# Patient Record
Sex: MALE | Race: WHITE | Employment: FULL TIME | URBAN - METROPOLITAN AREA
[De-identification: names, ages, dates, MRNs, and addresses within clinical notes are randomized per-mention and may not be internally consistent; named-entity substitution may affect disease eponyms.]

---

## 2017-07-09 ENCOUNTER — APPOINTMENT (OUTPATIENT)
Dept: GENERAL RADIOLOGY | Age: 37
End: 2017-07-09

## 2017-07-09 ENCOUNTER — HOSPITAL ENCOUNTER (EMERGENCY)
Age: 37
Discharge: HOME | End: 2017-07-09
Attending: EMERGENCY MEDICINE

## 2017-07-09 VITALS
SYSTOLIC BLOOD PRESSURE: 123 MMHG | RESPIRATION RATE: 22 BRPM | HEART RATE: 90 BPM | WEIGHT: 160 LBS | OXYGEN SATURATION: 98 % | DIASTOLIC BLOOD PRESSURE: 76 MMHG

## 2017-07-09 DIAGNOSIS — S62.609B FINGER FRACTURE, LEFT, OPEN, INITIAL ENCOUNTER: ICD-10-CM

## 2017-07-09 DIAGNOSIS — S61.412A LACERATION OF HAND, LEFT, INITIAL ENCOUNTER: Primary | ICD-10-CM

## 2017-07-09 DIAGNOSIS — S61.209A FINGER, OPEN WOUNDS WITH TENDON INJURY, INITIAL ENCOUNTER: ICD-10-CM

## 2017-07-09 PROCEDURE — 6360000002 HC RX W HCPCS: Performed by: EMERGENCY MEDICINE

## 2017-07-09 PROCEDURE — 2500000003 HC RX 250 WO HCPCS: Performed by: EMERGENCY MEDICINE

## 2017-07-09 PROCEDURE — 12002 RPR S/N/AX/GEN/TRNK2.6-7.5CM: CPT

## 2017-07-09 PROCEDURE — 73130 X-RAY EXAM OF HAND: CPT

## 2017-07-09 PROCEDURE — 96375 TX/PRO/DX INJ NEW DRUG ADDON: CPT

## 2017-07-09 PROCEDURE — 2580000003 HC RX 258: Performed by: EMERGENCY MEDICINE

## 2017-07-09 PROCEDURE — 6370000000 HC RX 637 (ALT 250 FOR IP): Performed by: EMERGENCY MEDICINE

## 2017-07-09 PROCEDURE — 96365 THER/PROPH/DIAG IV INF INIT: CPT

## 2017-07-09 PROCEDURE — 90715 TDAP VACCINE 7 YRS/> IM: CPT | Performed by: EMERGENCY MEDICINE

## 2017-07-09 PROCEDURE — 90471 IMMUNIZATION ADMIN: CPT | Performed by: EMERGENCY MEDICINE

## 2017-07-09 PROCEDURE — 2580000003 HC RX 258

## 2017-07-09 PROCEDURE — 99283 EMERGENCY DEPT VISIT LOW MDM: CPT

## 2017-07-09 RX ORDER — 0.9 % SODIUM CHLORIDE 0.9 %
500 INTRAVENOUS SOLUTION INTRAVENOUS ONCE
Status: COMPLETED | OUTPATIENT
Start: 2017-07-09 | End: 2017-07-09

## 2017-07-09 RX ORDER — LORAZEPAM 2 MG/ML
1 INJECTION INTRAMUSCULAR ONCE
Status: COMPLETED | OUTPATIENT
Start: 2017-07-09 | End: 2017-07-09

## 2017-07-09 RX ORDER — MAGNESIUM HYDROXIDE 1200 MG/15ML
1000 LIQUID ORAL CONTINUOUS
Status: DISCONTINUED | OUTPATIENT
Start: 2017-07-09 | End: 2017-07-09 | Stop reason: HOSPADM

## 2017-07-09 RX ORDER — IBUPROFEN 600 MG/1
600 TABLET ORAL EVERY 6 HOURS PRN
Qty: 30 TABLET | Refills: 0 | Status: SHIPPED | OUTPATIENT
Start: 2017-07-09 | End: 2018-08-26

## 2017-07-09 RX ORDER — MORPHINE SULFATE 4 MG/ML
4 INJECTION, SOLUTION INTRAMUSCULAR; INTRAVENOUS ONCE
Status: COMPLETED | OUTPATIENT
Start: 2017-07-09 | End: 2017-07-09

## 2017-07-09 RX ORDER — GINSENG 100 MG
CAPSULE ORAL 3 TIMES DAILY
Status: DISCONTINUED | OUTPATIENT
Start: 2017-07-09 | End: 2017-07-09 | Stop reason: HOSPADM

## 2017-07-09 RX ORDER — OXYCODONE HYDROCHLORIDE AND ACETAMINOPHEN 5; 325 MG/1; MG/1
1 TABLET ORAL EVERY 6 HOURS PRN
Qty: 20 TABLET | Refills: 0 | Status: SHIPPED | OUTPATIENT
Start: 2017-07-09 | End: 2018-08-26

## 2017-07-09 RX ORDER — LIDOCAINE HYDROCHLORIDE 10 MG/ML
5 INJECTION, SOLUTION INFILTRATION; PERINEURAL ONCE
Status: COMPLETED | OUTPATIENT
Start: 2017-07-09 | End: 2017-07-09

## 2017-07-09 RX ORDER — CEPHALEXIN 500 MG/1
500 CAPSULE ORAL 4 TIMES DAILY
Qty: 40 CAPSULE | Refills: 0 | Status: SHIPPED | OUTPATIENT
Start: 2017-07-09 | End: 2017-07-19

## 2017-07-09 RX ORDER — MAGNESIUM HYDROXIDE 1200 MG/15ML
LIQUID ORAL
Status: COMPLETED
Start: 2017-07-09 | End: 2017-07-09

## 2017-07-09 RX ORDER — IBUPROFEN 200 MG
200 TABLET ORAL EVERY 6 HOURS PRN
COMMUNITY
End: 2018-08-26

## 2017-07-09 RX ORDER — ONDANSETRON 2 MG/ML
4 INJECTION INTRAMUSCULAR; INTRAVENOUS ONCE
Status: COMPLETED | OUTPATIENT
Start: 2017-07-09 | End: 2017-07-09

## 2017-07-09 RX ADMIN — ONDANSETRON 4 MG: 2 INJECTION INTRAMUSCULAR; INTRAVENOUS at 12:18

## 2017-07-09 RX ADMIN — CEFAZOLIN 1 G: 1 INJECTION, POWDER, FOR SOLUTION INTRAMUSCULAR; INTRAVENOUS at 12:59

## 2017-07-09 RX ADMIN — SODIUM CHLORIDE: 900 IRRIGANT IRRIGATION at 12:35

## 2017-07-09 RX ADMIN — BACITRACIN: 500 OINTMENT TOPICAL at 14:00

## 2017-07-09 RX ADMIN — SODIUM CHLORIDE 1000 ML: 900 IRRIGANT IRRIGATION at 12:33

## 2017-07-09 RX ADMIN — SODIUM CHLORIDE 500 ML: 9 INJECTION, SOLUTION INTRAVENOUS at 12:17

## 2017-07-09 RX ADMIN — MORPHINE SULFATE 4 MG: 4 INJECTION, SOLUTION INTRAMUSCULAR; INTRAVENOUS at 12:18

## 2017-07-09 RX ADMIN — LORAZEPAM 1 MG: 2 INJECTION INTRAMUSCULAR; INTRAVENOUS at 12:30

## 2017-07-09 RX ADMIN — TETANUS TOXOID, REDUCED DIPHTHERIA TOXOID AND ACELLULAR PERTUSSIS VACCINE, ADSORBED 0.5 ML: 5; 2.5; 8; 8; 2.5 SUSPENSION INTRAMUSCULAR at 12:30

## 2017-07-09 RX ADMIN — LIDOCAINE HYDROCHLORIDE 5 ML: 10 INJECTION, SOLUTION INFILTRATION; PERINEURAL at 13:00

## 2017-07-09 ASSESSMENT — ENCOUNTER SYMPTOMS
TROUBLE SWALLOWING: 0
CONSTIPATION: 0
CHEST TIGHTNESS: 0
EYE PAIN: 0
SINUS PRESSURE: 0
EYE DISCHARGE: 0
ABDOMINAL PAIN: 0
SHORTNESS OF BREATH: 0
EYE REDNESS: 0
WHEEZING: 0
VOMITING: 0
DIARRHEA: 0
CHOKING: 0
BACK PAIN: 0
BLOOD IN STOOL: 0
STRIDOR: 0
VOICE CHANGE: 0
FACIAL SWELLING: 0
SORE THROAT: 0
COUGH: 0

## 2017-07-09 ASSESSMENT — PAIN SCALES - GENERAL
PAINLEVEL_OUTOF10: 10
PAINLEVEL_OUTOF10: 7
PAINLEVEL_OUTOF10: 10
PAINLEVEL_OUTOF10: 10

## 2017-07-09 ASSESSMENT — PAIN DESCRIPTION - LOCATION: LOCATION: HAND

## 2017-07-17 ENCOUNTER — ANESTHESIA (OUTPATIENT)
Dept: OPERATING ROOM | Age: 37
End: 2017-07-17

## 2017-07-17 ENCOUNTER — HOSPITAL ENCOUNTER (OUTPATIENT)
Age: 37
Setting detail: OUTPATIENT SURGERY
Discharge: HOME | End: 2017-07-17
Attending: ORTHOPAEDIC SURGERY | Admitting: ORTHOPAEDIC SURGERY

## 2017-07-17 ENCOUNTER — HOSPITAL ENCOUNTER (OUTPATIENT)
Dept: GENERAL RADIOLOGY | Age: 37
Setting detail: OUTPATIENT SURGERY
Discharge: HOME | End: 2017-07-17
Attending: ORTHOPAEDIC SURGERY

## 2017-07-17 ENCOUNTER — ANESTHESIA EVENT (OUTPATIENT)
Dept: OPERATING ROOM | Age: 37
End: 2017-07-17

## 2017-07-17 VITALS
SYSTOLIC BLOOD PRESSURE: 154 MMHG | TEMPERATURE: 96.1 F | RESPIRATION RATE: 16 BRPM | DIASTOLIC BLOOD PRESSURE: 91 MMHG | OXYGEN SATURATION: 100 %

## 2017-07-17 VITALS
RESPIRATION RATE: 16 BRPM | WEIGHT: 165 LBS | HEART RATE: 62 BPM | HEIGHT: 73 IN | SYSTOLIC BLOOD PRESSURE: 126 MMHG | TEMPERATURE: 98.7 F | DIASTOLIC BLOOD PRESSURE: 76 MMHG | BODY MASS INDEX: 21.87 KG/M2 | OXYGEN SATURATION: 96 %

## 2017-07-17 DIAGNOSIS — R52 PAIN: ICD-10-CM

## 2017-07-17 PROCEDURE — 2500000003 HC RX 250 WO HCPCS: Performed by: NURSE ANESTHETIST, CERTIFIED REGISTERED

## 2017-07-17 PROCEDURE — 6360000002 HC RX W HCPCS: Performed by: ORTHOPAEDIC SURGERY

## 2017-07-17 PROCEDURE — 2720000010 HC SURG SUPPLY STERILE: Performed by: ORTHOPAEDIC SURGERY

## 2017-07-17 PROCEDURE — 3700000001 HC ADD 15 MINUTES (ANESTHESIA): Performed by: ORTHOPAEDIC SURGERY

## 2017-07-17 PROCEDURE — 6360000002 HC RX W HCPCS: Performed by: NURSE ANESTHETIST, CERTIFIED REGISTERED

## 2017-07-17 PROCEDURE — 2500000003 HC RX 250 WO HCPCS: Performed by: STUDENT IN AN ORGANIZED HEALTH CARE EDUCATION/TRAINING PROGRAM

## 2017-07-17 PROCEDURE — 2580000003 HC RX 258: Performed by: STUDENT IN AN ORGANIZED HEALTH CARE EDUCATION/TRAINING PROGRAM

## 2017-07-17 PROCEDURE — C1713 ANCHOR/SCREW BN/BN,TIS/BN: HCPCS | Performed by: ORTHOPAEDIC SURGERY

## 2017-07-17 PROCEDURE — 7100000010 HC PHASE II RECOVERY - FIRST 15 MIN: Performed by: ORTHOPAEDIC SURGERY

## 2017-07-17 PROCEDURE — 3209999900 FLUORO FOR SURGICAL PROCEDURES

## 2017-07-17 PROCEDURE — 7100000000 HC PACU RECOVERY - FIRST 15 MIN: Performed by: ORTHOPAEDIC SURGERY

## 2017-07-17 PROCEDURE — 2580000003 HC RX 258: Performed by: ORTHOPAEDIC SURGERY

## 2017-07-17 PROCEDURE — 3600000004 HC SURGERY LEVEL 4 BASE: Performed by: ORTHOPAEDIC SURGERY

## 2017-07-17 PROCEDURE — 6370000000 HC RX 637 (ALT 250 FOR IP): Performed by: ORTHOPAEDIC SURGERY

## 2017-07-17 PROCEDURE — 6370000000 HC RX 637 (ALT 250 FOR IP): Performed by: STUDENT IN AN ORGANIZED HEALTH CARE EDUCATION/TRAINING PROGRAM

## 2017-07-17 PROCEDURE — 7100000001 HC PACU RECOVERY - ADDTL 15 MIN: Performed by: ORTHOPAEDIC SURGERY

## 2017-07-17 PROCEDURE — 3600000014 HC SURGERY LEVEL 4 ADDTL 15MIN: Performed by: ORTHOPAEDIC SURGERY

## 2017-07-17 PROCEDURE — 3700000000 HC ANESTHESIA ATTENDED CARE: Performed by: ORTHOPAEDIC SURGERY

## 2017-07-17 PROCEDURE — 7100000011 HC PHASE II RECOVERY - ADDTL 15 MIN: Performed by: ORTHOPAEDIC SURGERY

## 2017-07-17 PROCEDURE — 6360000002 HC RX W HCPCS: Performed by: STUDENT IN AN ORGANIZED HEALTH CARE EDUCATION/TRAINING PROGRAM

## 2017-07-17 DEVICE — SCREW BNE L11MM DIA1.5MM HND 316L S STL ST VAR ANG LOK T4: Type: IMPLANTABLE DEVICE | Site: HAND | Status: FUNCTIONAL

## 2017-07-17 DEVICE — SCREW BNE L14MM DIA1.5MM HND 316L S STL ST VAR ANG LOK T4: Type: IMPLANTABLE DEVICE | Site: HAND | Status: FUNCTIONAL

## 2017-07-17 DEVICE — SCREW BNE L10MM DIA1.5MM HND 316L S STL ST VAR ANG LOK T4: Type: IMPLANTABLE DEVICE | Site: HAND | Status: FUNCTIONAL

## 2017-07-17 DEVICE — SCREW BNE L13MM DIA1.5MM HND 316L S STL ST VAR ANG LOK T4: Type: IMPLANTABLE DEVICE | Site: HAND | Status: FUNCTIONAL

## 2017-07-17 DEVICE — IMPLANTABLE DEVICE: Type: IMPLANTABLE DEVICE | Site: HAND | Status: FUNCTIONAL

## 2017-07-17 DEVICE — SCREW BNE L12MM DIA1.5MM CORT S STL ST FULL THRD T4 STARDRV: Type: IMPLANTABLE DEVICE | Site: HAND | Status: FUNCTIONAL

## 2017-07-17 RX ORDER — SODIUM CHLORIDE 0.9 % (FLUSH) 0.9 %
10 SYRINGE (ML) INJECTION PRN
Status: DISCONTINUED | OUTPATIENT
Start: 2017-07-17 | End: 2017-07-17 | Stop reason: HOSPADM

## 2017-07-17 RX ORDER — HYDROCODONE BITARTRATE AND ACETAMINOPHEN 5; 325 MG/1; MG/1
2 TABLET ORAL PRN
Status: COMPLETED | OUTPATIENT
Start: 2017-07-17 | End: 2017-07-17

## 2017-07-17 RX ORDER — FENTANYL CITRATE 50 UG/ML
50 INJECTION, SOLUTION INTRAMUSCULAR; INTRAVENOUS EVERY 10 MIN PRN
Status: DISCONTINUED | OUTPATIENT
Start: 2017-07-17 | End: 2017-07-17 | Stop reason: HOSPADM

## 2017-07-17 RX ORDER — MAGNESIUM HYDROXIDE 1200 MG/15ML
LIQUID ORAL CONTINUOUS PRN
Status: DISCONTINUED | OUTPATIENT
Start: 2017-07-17 | End: 2017-07-17 | Stop reason: HOSPADM

## 2017-07-17 RX ORDER — SODIUM CHLORIDE 0.9 % (FLUSH) 0.9 %
10 SYRINGE (ML) INJECTION EVERY 12 HOURS SCHEDULED
Status: DISCONTINUED | OUTPATIENT
Start: 2017-07-17 | End: 2017-07-17 | Stop reason: HOSPADM

## 2017-07-17 RX ORDER — LIDOCAINE HYDROCHLORIDE 10 MG/ML
1 INJECTION, SOLUTION EPIDURAL; INFILTRATION; INTRACAUDAL; PERINEURAL
Status: COMPLETED | OUTPATIENT
Start: 2017-07-17 | End: 2017-07-17

## 2017-07-17 RX ORDER — OXYCODONE HYDROCHLORIDE AND ACETAMINOPHEN 5; 325 MG/1; MG/1
1 TABLET ORAL EVERY 4 HOURS PRN
Status: DISCONTINUED | OUTPATIENT
Start: 2017-07-17 | End: 2017-07-17 | Stop reason: HOSPADM

## 2017-07-17 RX ORDER — MORPHINE SULFATE 4 MG/ML
4 INJECTION, SOLUTION INTRAMUSCULAR; INTRAVENOUS
Status: DISCONTINUED | OUTPATIENT
Start: 2017-07-17 | End: 2017-07-17 | Stop reason: HOSPADM

## 2017-07-17 RX ORDER — SODIUM CHLORIDE 9 MG/ML
50 INJECTION, SOLUTION INTRAVENOUS CONTINUOUS
Status: DISCONTINUED | OUTPATIENT
Start: 2017-07-17 | End: 2017-07-17 | Stop reason: HOSPADM

## 2017-07-17 RX ORDER — OXYCODONE HYDROCHLORIDE AND ACETAMINOPHEN 5; 325 MG/1; MG/1
1 TABLET ORAL EVERY 4 HOURS PRN
Qty: 40 TABLET | Refills: 0 | Status: SHIPPED | OUTPATIENT
Start: 2017-07-17 | End: 2017-07-24

## 2017-07-17 RX ORDER — SULFAMETHOXAZOLE AND TRIMETHOPRIM 800; 160 MG/1; MG/1
1 TABLET ORAL 2 TIMES DAILY
COMMUNITY
End: 2018-08-26

## 2017-07-17 RX ORDER — MEPERIDINE HYDROCHLORIDE 25 MG/ML
12.5 INJECTION INTRAMUSCULAR; INTRAVENOUS; SUBCUTANEOUS EVERY 5 MIN PRN
Status: DISCONTINUED | OUTPATIENT
Start: 2017-07-17 | End: 2017-07-17 | Stop reason: HOSPADM

## 2017-07-17 RX ORDER — ONDANSETRON 2 MG/ML
INJECTION INTRAMUSCULAR; INTRAVENOUS PRN
Status: DISCONTINUED | OUTPATIENT
Start: 2017-07-17 | End: 2017-07-17 | Stop reason: SDUPTHER

## 2017-07-17 RX ORDER — DIPHENHYDRAMINE HYDROCHLORIDE 50 MG/ML
12.5 INJECTION INTRAMUSCULAR; INTRAVENOUS
Status: DISCONTINUED | OUTPATIENT
Start: 2017-07-17 | End: 2017-07-17 | Stop reason: HOSPADM

## 2017-07-17 RX ORDER — MIDAZOLAM HYDROCHLORIDE 1 MG/ML
INJECTION INTRAMUSCULAR; INTRAVENOUS PRN
Status: DISCONTINUED | OUTPATIENT
Start: 2017-07-17 | End: 2017-07-17 | Stop reason: SDUPTHER

## 2017-07-17 RX ORDER — ONDANSETRON 2 MG/ML
4 INJECTION INTRAMUSCULAR; INTRAVENOUS EVERY 6 HOURS PRN
Status: DISCONTINUED | OUTPATIENT
Start: 2017-07-17 | End: 2017-07-17 | Stop reason: HOSPADM

## 2017-07-17 RX ORDER — HYDROCODONE BITARTRATE AND ACETAMINOPHEN 5; 325 MG/1; MG/1
1 TABLET ORAL PRN
Status: COMPLETED | OUTPATIENT
Start: 2017-07-17 | End: 2017-07-17

## 2017-07-17 RX ORDER — SODIUM CHLORIDE, SODIUM LACTATE, POTASSIUM CHLORIDE, CALCIUM CHLORIDE 600; 310; 30; 20 MG/100ML; MG/100ML; MG/100ML; MG/100ML
INJECTION, SOLUTION INTRAVENOUS CONTINUOUS
Status: DISCONTINUED | OUTPATIENT
Start: 2017-07-17 | End: 2017-07-17 | Stop reason: HOSPADM

## 2017-07-17 RX ORDER — OXYCODONE HYDROCHLORIDE AND ACETAMINOPHEN 5; 325 MG/1; MG/1
2 TABLET ORAL EVERY 4 HOURS PRN
Status: DISCONTINUED | OUTPATIENT
Start: 2017-07-17 | End: 2017-07-17 | Stop reason: HOSPADM

## 2017-07-17 RX ORDER — FENTANYL CITRATE 50 UG/ML
INJECTION, SOLUTION INTRAMUSCULAR; INTRAVENOUS PRN
Status: DISCONTINUED | OUTPATIENT
Start: 2017-07-17 | End: 2017-07-17 | Stop reason: SDUPTHER

## 2017-07-17 RX ORDER — METOCLOPRAMIDE HYDROCHLORIDE 5 MG/ML
10 INJECTION INTRAMUSCULAR; INTRAVENOUS
Status: DISCONTINUED | OUTPATIENT
Start: 2017-07-17 | End: 2017-07-17 | Stop reason: HOSPADM

## 2017-07-17 RX ORDER — ONDANSETRON 2 MG/ML
4 INJECTION INTRAMUSCULAR; INTRAVENOUS
Status: DISCONTINUED | OUTPATIENT
Start: 2017-07-17 | End: 2017-07-17 | Stop reason: HOSPADM

## 2017-07-17 RX ORDER — PROPOFOL 10 MG/ML
INJECTION, EMULSION INTRAVENOUS PRN
Status: DISCONTINUED | OUTPATIENT
Start: 2017-07-17 | End: 2017-07-17 | Stop reason: SDUPTHER

## 2017-07-17 RX ORDER — LIDOCAINE HYDROCHLORIDE 20 MG/ML
INJECTION, SOLUTION INFILTRATION; PERINEURAL PRN
Status: DISCONTINUED | OUTPATIENT
Start: 2017-07-17 | End: 2017-07-17 | Stop reason: SDUPTHER

## 2017-07-17 RX ORDER — ACETAMINOPHEN 325 MG/1
650 TABLET ORAL EVERY 4 HOURS PRN
Status: DISCONTINUED | OUTPATIENT
Start: 2017-07-17 | End: 2017-07-17 | Stop reason: HOSPADM

## 2017-07-17 RX ORDER — MORPHINE SULFATE 2 MG/ML
2 INJECTION, SOLUTION INTRAMUSCULAR; INTRAVENOUS
Status: DISCONTINUED | OUTPATIENT
Start: 2017-07-17 | End: 2017-07-17 | Stop reason: HOSPADM

## 2017-07-17 RX ORDER — GINSENG 100 MG
CAPSULE ORAL PRN
Status: DISCONTINUED | OUTPATIENT
Start: 2017-07-17 | End: 2017-07-17 | Stop reason: HOSPADM

## 2017-07-17 RX ORDER — DEXAMETHASONE SODIUM PHOSPHATE 10 MG/ML
INJECTION INTRAMUSCULAR; INTRAVENOUS PRN
Status: DISCONTINUED | OUTPATIENT
Start: 2017-07-17 | End: 2017-07-17 | Stop reason: SDUPTHER

## 2017-07-17 RX ADMIN — LIDOCAINE HYDROCHLORIDE 0.1 ML: 10 INJECTION, SOLUTION EPIDURAL; INFILTRATION; INTRACAUDAL; PERINEURAL at 10:32

## 2017-07-17 RX ADMIN — HYDROMORPHONE HYDROCHLORIDE 0.5 MG: 1 INJECTION, SOLUTION INTRAMUSCULAR; INTRAVENOUS; SUBCUTANEOUS at 15:00

## 2017-07-17 RX ADMIN — PROPOFOL 200 MG: 10 INJECTION, EMULSION INTRAVENOUS at 12:51

## 2017-07-17 RX ADMIN — LIDOCAINE HYDROCHLORIDE 60 MG: 20 INJECTION, SOLUTION INFILTRATION; PERINEURAL at 12:51

## 2017-07-17 RX ADMIN — HYDROCODONE BITARTRATE AND ACETAMINOPHEN 2 TABLET: 5; 325 TABLET ORAL at 14:50

## 2017-07-17 RX ADMIN — SODIUM CHLORIDE, POTASSIUM CHLORIDE, SODIUM LACTATE AND CALCIUM CHLORIDE: 600; 310; 30; 20 INJECTION, SOLUTION INTRAVENOUS at 13:42

## 2017-07-17 RX ADMIN — MIDAZOLAM HYDROCHLORIDE 2 MG: 1 INJECTION, SOLUTION INTRAMUSCULAR; INTRAVENOUS at 12:45

## 2017-07-17 RX ADMIN — DEXAMETHASONE SODIUM PHOSPHATE 5 MG: 10 INJECTION INTRAMUSCULAR; INTRAVENOUS at 12:59

## 2017-07-17 RX ADMIN — ONDANSETRON 4 MG: 2 INJECTION, SOLUTION INTRAMUSCULAR; INTRAVENOUS at 12:59

## 2017-07-17 RX ADMIN — FENTANYL CITRATE 100 MCG: 50 INJECTION, SOLUTION INTRAMUSCULAR; INTRAVENOUS at 13:05

## 2017-07-17 RX ADMIN — VANCOMYCIN HYDROCHLORIDE 1000 MG: 1 INJECTION, POWDER, LYOPHILIZED, FOR SOLUTION INTRAVENOUS at 11:51

## 2017-07-17 RX ADMIN — HYDROMORPHONE HYDROCHLORIDE 0.9 MG: 1 INJECTION, SOLUTION INTRAMUSCULAR; INTRAVENOUS; SUBCUTANEOUS at 14:23

## 2017-07-17 RX ADMIN — HYDROMORPHONE HYDROCHLORIDE 0.5 MG: 1 INJECTION, SOLUTION INTRAMUSCULAR; INTRAVENOUS; SUBCUTANEOUS at 13:59

## 2017-07-17 RX ADMIN — SODIUM CHLORIDE, POTASSIUM CHLORIDE, SODIUM LACTATE AND CALCIUM CHLORIDE: 600; 310; 30; 20 INJECTION, SOLUTION INTRAVENOUS at 10:33

## 2017-07-17 RX ADMIN — HYDROMORPHONE HYDROCHLORIDE 0.5 MG: 1 INJECTION, SOLUTION INTRAMUSCULAR; INTRAVENOUS; SUBCUTANEOUS at 15:15

## 2017-07-17 RX ADMIN — HYDROMORPHONE HYDROCHLORIDE 0.6 MG: 1 INJECTION, SOLUTION INTRAMUSCULAR; INTRAVENOUS; SUBCUTANEOUS at 13:15

## 2017-07-17 ASSESSMENT — ENCOUNTER SYMPTOMS
VOMITING: 0
HEARTBURN: 0
SHORTNESS OF BREATH: 0
COUGH: 0
CONSTIPATION: 0
EYE PAIN: 0
WHEEZING: 0
BLURRED VISION: 0
SORE THROAT: 0
ABDOMINAL PAIN: 0
BACK PAIN: 0
NAUSEA: 0
DOUBLE VISION: 0
PHOTOPHOBIA: 0
DIARRHEA: 0

## 2017-07-17 ASSESSMENT — PAIN SCALES - GENERAL
PAINLEVEL_OUTOF10: 3
PAINLEVEL_OUTOF10: 4
PAINLEVEL_OUTOF10: 7
PAINLEVEL_OUTOF10: 6
PAINLEVEL_OUTOF10: 5
PAINLEVEL_OUTOF10: 4

## 2017-07-17 ASSESSMENT — PAIN DESCRIPTION - DESCRIPTORS: DESCRIPTORS: ACHING

## 2017-07-17 ASSESSMENT — PAIN - FUNCTIONAL ASSESSMENT: PAIN_FUNCTIONAL_ASSESSMENT: 0-10

## 2018-01-10 ENCOUNTER — HOSPITAL ENCOUNTER (EMERGENCY)
Age: 38
Discharge: HOME | End: 2018-01-10
Attending: EMERGENCY MEDICINE

## 2018-01-10 ENCOUNTER — APPOINTMENT (OUTPATIENT)
Dept: GENERAL RADIOLOGY | Age: 38
End: 2018-01-10

## 2018-01-10 VITALS
OXYGEN SATURATION: 98 % | WEIGHT: 170 LBS | RESPIRATION RATE: 16 BRPM | HEART RATE: 66 BPM | BODY MASS INDEX: 23.03 KG/M2 | SYSTOLIC BLOOD PRESSURE: 124 MMHG | TEMPERATURE: 98.7 F | HEIGHT: 72 IN | DIASTOLIC BLOOD PRESSURE: 65 MMHG

## 2018-01-10 DIAGNOSIS — S60.222A CONTUSION OF LEFT HAND, INITIAL ENCOUNTER: Primary | ICD-10-CM

## 2018-01-10 DIAGNOSIS — M79.642 LEFT HAND PAIN: ICD-10-CM

## 2018-01-10 PROCEDURE — 99283 EMERGENCY DEPT VISIT LOW MDM: CPT

## 2018-01-10 PROCEDURE — 73130 X-RAY EXAM OF HAND: CPT

## 2018-01-10 RX ORDER — NAPROXEN 500 MG/1
500 TABLET ORAL 2 TIMES DAILY
Qty: 30 TABLET | Refills: 0 | Status: SHIPPED | OUTPATIENT
Start: 2018-01-10 | End: 2018-08-26

## 2018-01-10 RX ORDER — PREDNISONE 20 MG/1
20 TABLET ORAL DAILY
Qty: 5 TABLET | Refills: 0 | Status: SHIPPED | OUTPATIENT
Start: 2018-01-10 | End: 2018-01-15

## 2018-01-10 ASSESSMENT — ENCOUNTER SYMPTOMS
SINUS PRESSURE: 0
EYE PAIN: 0
CHOKING: 0
DIARRHEA: 0
WHEEZING: 0
VOICE CHANGE: 0
EYE REDNESS: 0
TROUBLE SWALLOWING: 0
COUGH: 0
SHORTNESS OF BREATH: 0
EYE DISCHARGE: 0
SORE THROAT: 0
CONSTIPATION: 0
FACIAL SWELLING: 0
CHEST TIGHTNESS: 0
BLOOD IN STOOL: 0
VOMITING: 0
ABDOMINAL PAIN: 0
BACK PAIN: 0
STRIDOR: 0

## 2018-01-10 ASSESSMENT — PAIN DESCRIPTION - DESCRIPTORS: DESCRIPTORS: SHARP;SHOOTING

## 2018-01-10 ASSESSMENT — PAIN DESCRIPTION - LOCATION
LOCATION: HAND
LOCATION: HAND

## 2018-01-10 ASSESSMENT — PAIN DESCRIPTION - ORIENTATION
ORIENTATION: LEFT
ORIENTATION: LEFT

## 2018-01-10 ASSESSMENT — PAIN DESCRIPTION - ONSET: ONSET: ON-GOING

## 2018-01-10 ASSESSMENT — PAIN DESCRIPTION - PAIN TYPE
TYPE: ACUTE PAIN
TYPE: CHRONIC PAIN

## 2018-01-10 ASSESSMENT — PAIN SCALES - GENERAL
PAINLEVEL_OUTOF10: 2
PAINLEVEL_OUTOF10: 3

## 2018-01-10 ASSESSMENT — PAIN DESCRIPTION - PROGRESSION: CLINICAL_PROGRESSION: GRADUALLY WORSENING

## 2018-01-10 ASSESSMENT — PAIN DESCRIPTION - FREQUENCY: FREQUENCY: INTERMITTENT

## 2018-01-10 NOTE — ED PROVIDER NOTES
[address] [facility] ED  eMERGENCY dEPARTMENT eNCOUnter      Pt Name: María Maier  MRN: [id number]  [place] 1980  Date of evaluation: 1/10/2018  Provider: Eran Rebollar MD    [address] [place] [address]       Chief Complaint   Patient presents with    Hand Pain         HISTORY OF PRESENT ILLNESS   (Location/Symptom, Timing/Onset, Context/Setting, Quality, Duration, Modifying Factors, Severity)  Note limiting factors. María Maier is a 42 y.o. male who presents to the emergency department Patient come to this emergency because of increasing swelling and pain for the last 2 days time to the (as per patient he has a remote crush injury to the left hand in the month of June he requires stitches and he has chronic numbness to the middle finger which is not new as well as mobility to the finger results are diminished, bony injury nor redness or drainage no fever patient is a     HPI    Nursing Notes were reviewed. REVIEW OF SYSTEMS    (2-9 systems for level 4, 10 or more for level 5)     Review of Systems   Constitutional: Negative. Negative for activity change and fever. HENT: Negative for congestion, drooling, facial swelling, mouth sores, nosebleeds, sinus pressure, sore throat, trouble swallowing and voice change. Eyes: Negative for pain, discharge, redness and visual disturbance. Respiratory: Negative for cough, choking, chest tightness, shortness of breath, wheezing and stridor. Cardiovascular: Negative for chest pain, palpitations and leg swelling. Gastrointestinal: Negative for abdominal pain, blood in stool, constipation, diarrhea and vomiting. Endocrine: Negative for cold intolerance, polyphagia and polyuria. Genitourinary: Negative for dysuria, flank pain, frequency, genital sores and urgency. Musculoskeletal: Positive for arthralgias, joint swelling and myalgias. Negative for back pain, neck pain and neck stiffness. Skin: Negative for pallor and rash.

## 2018-08-26 ENCOUNTER — HOSPITAL ENCOUNTER (EMERGENCY)
Age: 38
Discharge: HOME | End: 2018-08-26
Attending: EMERGENCY MEDICINE

## 2018-08-26 VITALS
SYSTOLIC BLOOD PRESSURE: 119 MMHG | DIASTOLIC BLOOD PRESSURE: 74 MMHG | RESPIRATION RATE: 20 BRPM | OXYGEN SATURATION: 95 % | TEMPERATURE: 99 F | HEART RATE: 97 BPM | HEIGHT: 72 IN | BODY MASS INDEX: 21.67 KG/M2 | WEIGHT: 160 LBS

## 2018-08-26 DIAGNOSIS — H65.01 RIGHT ACUTE SEROUS OTITIS MEDIA, RECURRENCE NOT SPECIFIED: ICD-10-CM

## 2018-08-26 DIAGNOSIS — H92.03 OTALGIA OF BOTH EARS: ICD-10-CM

## 2018-08-26 DIAGNOSIS — J01.10 ACUTE FRONTAL SINUSITIS, RECURRENCE NOT SPECIFIED: Primary | ICD-10-CM

## 2018-08-26 PROCEDURE — 99282 EMERGENCY DEPT VISIT SF MDM: CPT

## 2018-08-26 RX ORDER — AZITHROMYCIN 250 MG/1
TABLET, FILM COATED ORAL
Qty: 6 TABLET | Refills: 0 | Status: SHIPPED | OUTPATIENT
Start: 2018-08-26 | End: 2018-09-05

## 2018-08-26 RX ORDER — NEOMYCIN SULFATE, POLYMYXIN B SULFATE AND HYDROCORTISONE 10; 3.5; 1 MG/ML; MG/ML; [USP'U]/ML
3 SUSPENSION/ DROPS AURICULAR (OTIC) 3 TIMES DAILY
Qty: 1 BOTTLE | Refills: 0 | Status: SHIPPED | OUTPATIENT
Start: 2018-08-26 | End: 2018-09-02

## 2018-08-26 ASSESSMENT — ENCOUNTER SYMPTOMS
SINUS PRESSURE: 1
BACK PAIN: 0
FACIAL SWELLING: 0
CHOKING: 0
SHORTNESS OF BREATH: 0
BLOOD IN STOOL: 0
SORE THROAT: 0
DIARRHEA: 0
CHEST TIGHTNESS: 0
CONSTIPATION: 0
EYE REDNESS: 0
VOMITING: 0
STRIDOR: 0
EYE PAIN: 0
EYE DISCHARGE: 0
RHINORRHEA: 1
TROUBLE SWALLOWING: 0
VOICE CHANGE: 0
SINUS PAIN: 1
COUGH: 0
NAUSEA: 1
WHEEZING: 0
ABDOMINAL PAIN: 0

## 2018-08-26 ASSESSMENT — PAIN DESCRIPTION - PROGRESSION: CLINICAL_PROGRESSION: NOT CHANGED

## 2018-08-26 ASSESSMENT — PAIN DESCRIPTION - DESCRIPTORS
DESCRIPTORS: ACHING
DESCRIPTORS: PRESSURE

## 2018-08-26 ASSESSMENT — PAIN DESCRIPTION - FREQUENCY
FREQUENCY: CONTINUOUS
FREQUENCY: CONTINUOUS

## 2018-08-26 ASSESSMENT — PAIN DESCRIPTION - ONSET: ONSET: ON-GOING

## 2018-08-26 ASSESSMENT — PAIN SCALES - GENERAL
PAINLEVEL_OUTOF10: 9
PAINLEVEL_OUTOF10: 4

## 2018-08-26 ASSESSMENT — PAIN - FUNCTIONAL ASSESSMENT: PAIN_FUNCTIONAL_ASSESSMENT: 0-10

## 2018-08-26 ASSESSMENT — PAIN DESCRIPTION - LOCATION
LOCATION: EAR
LOCATION: EAR;HEAD

## 2018-08-26 ASSESSMENT — PAIN DESCRIPTION - PAIN TYPE
TYPE: ACUTE PAIN
TYPE: ACUTE PAIN

## 2018-08-26 ASSESSMENT — PAIN DESCRIPTION - ORIENTATION: ORIENTATION: RIGHT

## 2018-08-26 NOTE — ED NOTES
Patient complains of nasal congestion and right ear ache starting over the past 24 hours. Resp easy and unlabored. No distress noted.       Mariaelena Chiu RN  08/26/18 4036

## 2018-08-26 NOTE — ED PROVIDER NOTES
syncope, weakness, numbness and headaches. Hematological: Negative for adenopathy. Does not bruise/bleed easily. Psychiatric/Behavioral: Negative for agitation, behavioral problems, hallucinations and sleep disturbance. The patient is not hyperactive. All other systems reviewed and are negative. Except as noted above the remainder of the review of systems was reviewed and negative. PAST MEDICAL HISTORY   History reviewed. No pertinent past medical history. SURGICAL HISTORY       Past Surgical History:   Procedure Laterality Date    FRACTURE SURGERY Left 7/17/2017    OPEN REDUCTION  INTERNAL FIXATION PROXIMAL PHALANX LEFT LONG FINGER  (PAT ON ADMIT)  performed by Chetan Barron MD at [address] [place] [address] HAND SURGERY Left 7/17/2017    LEFT IRRIGATION AND DEBRIDEMENT LEFT HAND  MINI C-ARM SYNTHES MODULAR TRAY performed by Chetan Barron MD at [address]       Previous Medications    PSEUDOEPH-DOXYLAMINE-DM-APAP (NYQUIL PO)    Take by mouth       ALLERGIES     Keflex [cephalexin]    FAMILY HISTORY     History reviewed. No pertinent family history. SOCIAL HISTORY       Social History     Social History    Marital status:      Spouse name: N/A    Number of children: N/A    Years of education: N/A     Social History Main Topics    Smoking status: Current Every Day Smoker     Packs/day: 0.50    Smokeless tobacco: Former User     Types: Chew    Alcohol use Yes      Comment: [id number]    Drug use: Yes     Types: Marijuana      Comment: OCC    Sexual activity: Not Asked     Other Topics Concern    None     Social History Narrative    None       SCREENINGS             PHYSICAL EXAM    (up to 7 for level 4, 8 or more for level 5)     ED Triage Vitals [08/26/18 1425]   BP Temp Temp Source Pulse Resp SpO2 Height Weight   119/74 99 °F (37.2 °C) Oral 97 20 95 % 6' (1.829 m) 160 lb (72.6 kg)       Physical Exam   Constitutional: He is oriented to person, place, and time.  He appears well-developed. HENT:   Head: Normocephalic and atraumatic. Mouth/Throat: No oropharyngeal exudate. Patient looks and sounds very congested nasally and attention given to the oropharynx mild erythema of the oropharynx but no blisters patient has a swelling to the nasal turbinates positive sinus tenderness right tympanic membranes erythematous and bulging consistent with acute otitis media   Eyes: Right eye exhibits no discharge. Left eye exhibits no discharge. No scleral icterus. Neck: Neck supple. No JVD present. No tracheal deviation present. No thyromegaly present. Cardiovascular: Normal rate, regular rhythm and normal heart sounds. Exam reveals no gallop and no friction rub. No murmur heard. Pulmonary/Chest: Breath sounds normal. No respiratory distress. He has no wheezes. Abdominal: Soft. Bowel sounds are normal. He exhibits no mass. There is no rebound. Musculoskeletal: Normal range of motion. He exhibits no edema or tenderness. Lymphadenopathy:     He has no cervical adenopathy. Neurological: He is alert and oriented to person, place, and time. No cranial nerve deficit. He exhibits normal muscle tone. Skin: Skin is warm. No rash noted. No erythema.    Psychiatric: His behavior is normal. Thought content normal.       DIAGNOSTIC RESULTS     EKG: All EKG's are interpreted by the Emergency Department Physician who either signs or Co-signs this chart in the absence of a cardiologist.        RADIOLOGY:   Non-plain film images such as CT, Ultrasound and MRI are read by the radiologist. Plain radiographic images are visualized and preliminarily interpreted by the emergency physician with the below findings:        Interpretation per the Radiologist below, if available at the time of this note:    No orders to display         ED BEDSIDE ULTRASOUND:   Performed by ED Physician - none    LABS:  Labs Reviewed - No data to display    All other labs were within normal range or not returned

## 2023-12-11 ENCOUNTER — HOSPITAL ENCOUNTER (EMERGENCY)
Facility: HOSPITAL | Age: 43
Discharge: HOME | End: 2023-12-11
Attending: STUDENT IN AN ORGANIZED HEALTH CARE EDUCATION/TRAINING PROGRAM
Payer: COMMERCIAL

## 2023-12-11 VITALS
WEIGHT: 182 LBS | DIASTOLIC BLOOD PRESSURE: 87 MMHG | TEMPERATURE: 97.2 F | SYSTOLIC BLOOD PRESSURE: 134 MMHG | BODY MASS INDEX: 24.65 KG/M2 | HEIGHT: 72 IN | HEART RATE: 73 BPM | OXYGEN SATURATION: 97 % | RESPIRATION RATE: 18 BRPM

## 2023-12-11 DIAGNOSIS — K04.7 DENTAL ABSCESS: ICD-10-CM

## 2023-12-11 DIAGNOSIS — K08.89 PAIN, DENTAL: Primary | ICD-10-CM

## 2023-12-11 PROCEDURE — 2500000001 HC RX 250 WO HCPCS SELF ADMINISTERED DRUGS (ALT 637 FOR MEDICARE OP): Performed by: STUDENT IN AN ORGANIZED HEALTH CARE EDUCATION/TRAINING PROGRAM

## 2023-12-11 PROCEDURE — 99283 EMERGENCY DEPT VISIT LOW MDM: CPT | Performed by: STUDENT IN AN ORGANIZED HEALTH CARE EDUCATION/TRAINING PROGRAM

## 2023-12-11 RX ORDER — AMOXICILLIN AND CLAVULANATE POTASSIUM 875; 125 MG/1; MG/1
1 TABLET, FILM COATED ORAL ONCE
Status: COMPLETED | OUTPATIENT
Start: 2023-12-11 | End: 2023-12-11

## 2023-12-11 RX ORDER — OXYCODONE AND ACETAMINOPHEN 5; 325 MG/1; MG/1
1 TABLET ORAL ONCE
Status: COMPLETED | OUTPATIENT
Start: 2023-12-11 | End: 2023-12-11

## 2023-12-11 RX ORDER — AMOXICILLIN AND CLAVULANATE POTASSIUM 875; 125 MG/1; MG/1
1 TABLET, FILM COATED ORAL EVERY 12 HOURS
Qty: 14 TABLET | Refills: 0 | Status: SHIPPED | OUTPATIENT
Start: 2023-12-11 | End: 2023-12-21

## 2023-12-11 RX ORDER — OXYCODONE AND ACETAMINOPHEN 5; 325 MG/1; MG/1
1 TABLET ORAL EVERY 6 HOURS PRN
Qty: 12 TABLET | Refills: 0 | Status: SHIPPED | OUTPATIENT
Start: 2023-12-11 | End: 2023-12-14

## 2023-12-11 RX ADMIN — OXYCODONE HYDROCHLORIDE AND ACETAMINOPHEN 1 TABLET: 5; 325 TABLET ORAL at 13:16

## 2023-12-11 RX ADMIN — AMOXICILLIN AND CLAVULANATE POTASSIUM 875 MG: 875; 125 TABLET, FILM COATED ORAL at 13:16

## 2023-12-11 ASSESSMENT — PAIN SCALES - GENERAL: PAINLEVEL_OUTOF10: 10 - WORST POSSIBLE PAIN

## 2023-12-11 ASSESSMENT — LIFESTYLE VARIABLES
HAVE PEOPLE ANNOYED YOU BY CRITICIZING YOUR DRINKING: NO
HAVE YOU EVER FELT YOU SHOULD CUT DOWN ON YOUR DRINKING: NO
REASON UNABLE TO ASSESS: NO
EVER FELT BAD OR GUILTY ABOUT YOUR DRINKING: NO
EVER HAD A DRINK FIRST THING IN THE MORNING TO STEADY YOUR NERVES TO GET RID OF A HANGOVER: NO

## 2023-12-11 ASSESSMENT — COLUMBIA-SUICIDE SEVERITY RATING SCALE - C-SSRS
6. HAVE YOU EVER DONE ANYTHING, STARTED TO DO ANYTHING, OR PREPARED TO DO ANYTHING TO END YOUR LIFE?: NO
1. IN THE PAST MONTH, HAVE YOU WISHED YOU WERE DEAD OR WISHED YOU COULD GO TO SLEEP AND NOT WAKE UP?: NO
2. HAVE YOU ACTUALLY HAD ANY THOUGHTS OF KILLING YOURSELF?: NO

## 2023-12-11 ASSESSMENT — PAIN DESCRIPTION - PAIN TYPE: TYPE: ACUTE PAIN

## 2023-12-11 ASSESSMENT — PAIN DESCRIPTION - LOCATION: LOCATION: OTHER (COMMENT)

## 2023-12-11 ASSESSMENT — PAIN - FUNCTIONAL ASSESSMENT: PAIN_FUNCTIONAL_ASSESSMENT: 0-10

## 2023-12-11 NOTE — DISCHARGE INSTRUCTIONS
Please take the medications prescribed you as directed.  You have been given referral to multiple dental clinics.  Please call to schedule an appointment.  You may also use the dental clinic that you are currently affiliated with.  Please call your doctor or return to nearest emergency department being experiencing fevers, chills, difficulty swallowing, difficulty speaking, worsening swelling, worsening dental/facial pain, neck pain, chest pain, difficulty breathing, abdominal pain, nausea vomiting, or other signs and symptoms that you find concerning.  These documents have a lot of useful information! Please read them, so you know what to expect, what you can do for yourself at home, and also to know concerning signs warrant a return to the Emergency Department for additional evaluation.  You are welcome back any time. Thank you for entrusting your care to us, I hope we made your visit as pleasant as possible. Wishing you well!    Dr. Floyd

## 2023-12-11 NOTE — ED PROVIDER NOTES
"HPI   Chief Complaint   Patient presents with    Dental Pain     Dental pain, abscess        Patient is a 43-year-old male presenting to the emergency department with complaints of dental pain.  Patient states that he has been having worsening dental pain and swelling over the past few days but states that he is without coming to the emergency department as he \"does not like going to doctors\".  Patient endorses that he does have known poor dentition and does have a dentist that he follows with however he was unable to get an appointment and was advised to come to the emergency department for evaluation.  Patient denies any fevers, chills, chest pain, difficulty breathing, abdominal pain, nausea/vomiting, change in bowel or bladder habits, neck pain, vision changes.  Patient states he had difficulty eating primarily due to the pain but has no difficulty with fluids or swallowing.      History provided by:  Patient   used: No                        [place] Coma Scale Score: 15                  Patient History   No past medical history on file.  No past surgical history on file.  No family history on file.  Social History     Tobacco Use    Smoking status: Not on file    Smokeless tobacco: Not on file   Substance Use Topics    Alcohol use: Not on file    Drug use: Not on file       Physical Exam   ED Triage Vitals [12/11/23 1235]   Temp Heart Rate Resp BP   36.2 °C (97.2 °F) 73 18 134/87      SpO2 Temp Source Heart Rate Source Patient Position   97 % Tympanic Monitor --      BP Location FiO2 (%)     -- --       Physical Exam  Vitals and nursing note reviewed.   Constitutional:       General: He is not in acute distress.     Appearance: Normal appearance. He is not ill-appearing, toxic-appearing or diaphoretic.   HENT:      Head: Normocephalic and atraumatic.      Nose: Nose normal.      Mouth/Throat:      Mouth: Mucous membranes are moist.      Dentition: Abnormal dentition. Dental tenderness, " gingival swelling, dental caries and dental abscesses present.      Tongue: No lesions. Tongue does not deviate from midline.      Palate: No mass and lesions.      Pharynx: No oropharyngeal exudate or posterior oropharyngeal erythema.        Comments: Noted abscess with drainage  Eyes:      General: No scleral icterus.     Extraocular Movements: Extraocular movements intact.      Pupils: Pupils are equal, round, and reactive to light.   Cardiovascular:      Rate and Rhythm: Normal rate and regular rhythm.      Pulses: Normal pulses.      Heart sounds: Normal heart sounds. No murmur heard.     No friction rub. No gallop.   Pulmonary:      Effort: Pulmonary effort is normal. No respiratory distress.      Breath sounds: Normal breath sounds. No stridor. No wheezing, rhonchi or rales.   Chest:      Chest wall: No tenderness.   Abdominal:      General: Abdomen is flat. There is no distension.      Palpations: Abdomen is soft. There is no mass.      Tenderness: There is no abdominal tenderness. There is no guarding.      Hernia: No hernia is present.   Musculoskeletal:         General: No swelling, tenderness, deformity or signs of injury. Normal range of motion.      Cervical back: Normal range of motion and neck supple. No rigidity.   Skin:     General: Skin is warm and dry.      Capillary Refill: Capillary refill takes less than 2 seconds.      Coloration: Skin is not jaundiced or pale.      Findings: No bruising, erythema, lesion or rash.   Neurological:      General: No focal deficit present.      Mental Status: He is alert and oriented to person, place, and time. Mental status is at baseline.   Psychiatric:         Mood and Affect: Mood normal.         Behavior: Behavior normal.         ED Course & MDM   Diagnoses as of 12/11/23 1314   Pain, dental   Dental abscess       Medical Decision Making  Patient is an overall well-appearing 43-year-old male presenting to the emergency department with complaints of dental  tenderness/abscess.  Patient does have an obvious abscess in the right upper lateral maxillary area with some slight maxillary swelling.  Patient's airway is intact.  There is no sublingual tenderness or swelling.  No concerns for Pierre's.  Patient's neck is supple.  Uvula is midline.  No concern for posterior abscess.  Patient does have obvious drainage from an abscess near his teeth and the patient was given a prescription for Augmentin as well as Percocet as well as a dose here in the emergency department.  Patient states that he does have a dentist that he follows with but he was also given referrals to additional dental clinics to facilitate a speedy follow-up.  Patient was given return precautions.  Patient verbalized understanding of all structures given to them.  Patient was appreciative care and agreeable to discharge.        Procedure  Procedures     Harley Floyd DO  12/11/23 1312

## 2024-01-29 ENCOUNTER — APPOINTMENT (OUTPATIENT)
Dept: RADIOLOGY | Facility: HOSPITAL | Age: 44
End: 2024-01-29

## 2024-01-29 ENCOUNTER — HOSPITAL ENCOUNTER (EMERGENCY)
Facility: HOSPITAL | Age: 44
Discharge: HOME | End: 2024-01-29

## 2024-01-29 VITALS
HEART RATE: 93 BPM | OXYGEN SATURATION: 98 % | HEIGHT: 72 IN | TEMPERATURE: 97.2 F | BODY MASS INDEX: 21.67 KG/M2 | WEIGHT: 160 LBS | RESPIRATION RATE: 16 BRPM | SYSTOLIC BLOOD PRESSURE: 138 MMHG | DIASTOLIC BLOOD PRESSURE: 76 MMHG

## 2024-01-29 DIAGNOSIS — J18.9 MULTIFOCAL PNEUMONIA: Primary | ICD-10-CM

## 2024-01-29 LAB
FLUAV RNA RESP QL NAA+PROBE: NOT DETECTED
FLUBV RNA RESP QL NAA+PROBE: NOT DETECTED
RSV RNA RESP QL NAA+PROBE: NOT DETECTED
SARS-COV-2 RNA RESP QL NAA+PROBE: NOT DETECTED

## 2024-01-29 PROCEDURE — 71250 CT THORAX DX C-: CPT

## 2024-01-29 PROCEDURE — 2500000001 HC RX 250 WO HCPCS SELF ADMINISTERED DRUGS (ALT 637 FOR MEDICARE OP): Performed by: PHYSICIAN ASSISTANT

## 2024-01-29 PROCEDURE — 71046 X-RAY EXAM CHEST 2 VIEWS: CPT | Performed by: RADIOLOGY

## 2024-01-29 PROCEDURE — 71250 CT THORAX DX C-: CPT | Performed by: RADIOLOGY

## 2024-01-29 PROCEDURE — 99284 EMERGENCY DEPT VISIT MOD MDM: CPT

## 2024-01-29 PROCEDURE — 87637 SARSCOV2&INF A&B&RSV AMP PRB: CPT | Performed by: PHYSICIAN ASSISTANT

## 2024-01-29 PROCEDURE — 71046 X-RAY EXAM CHEST 2 VIEWS: CPT

## 2024-01-29 RX ORDER — KETOROLAC TROMETHAMINE 10 MG/1
10 TABLET, FILM COATED ORAL EVERY 6 HOURS PRN
Qty: 20 TABLET | Refills: 0 | Status: SHIPPED | OUTPATIENT
Start: 2024-01-29 | End: 2024-02-03

## 2024-01-29 RX ORDER — LEVOFLOXACIN 500 MG/1
750 TABLET, FILM COATED ORAL DAILY
Qty: 8 TABLET | Refills: 0 | Status: SHIPPED | OUTPATIENT
Start: 2024-01-29 | End: 2024-02-03

## 2024-01-29 RX ORDER — LEVOFLOXACIN 250 MG/1
750 TABLET ORAL ONCE
Status: COMPLETED | OUTPATIENT
Start: 2024-01-29 | End: 2024-01-29

## 2024-01-29 RX ADMIN — LEVOFLOXACIN 750 MG: 250 TABLET, FILM COATED ORAL at 18:23

## 2024-01-29 ASSESSMENT — LIFESTYLE VARIABLES
REASON UNABLE TO ASSESS: NO
EVER FELT BAD OR GUILTY ABOUT YOUR DRINKING: NO
HAVE PEOPLE ANNOYED YOU BY CRITICIZING YOUR DRINKING: NO
HAVE YOU EVER FELT YOU SHOULD CUT DOWN ON YOUR DRINKING: NO
EVER HAD A DRINK FIRST THING IN THE MORNING TO STEADY YOUR NERVES TO GET RID OF A HANGOVER: NO

## 2024-01-29 ASSESSMENT — PAIN SCALES - GENERAL: PAINLEVEL_OUTOF10: 4

## 2024-01-29 ASSESSMENT — PAIN - FUNCTIONAL ASSESSMENT: PAIN_FUNCTIONAL_ASSESSMENT: 0-10

## 2024-01-29 ASSESSMENT — PAIN DESCRIPTION - PROGRESSION: CLINICAL_PROGRESSION: NOT CHANGED

## 2024-01-29 NOTE — ED PROVIDER NOTES
HPI   Chief Complaint   Patient presents with    Flu Symptoms     Cough, congestion, chills, fatigue for a few days        A 43-year-old male patient comes in the emergency department today with complaints of not feeling well over the last 2 weeks.  He describes decreased appetite, congestion, cough.  Describes the cough as productive with sputum.  States that sputum has a very foul taste to it.  States he also has some right lung discomfort as well.  Very concerned about pneumonia.  This purpose comes in the emergency department today for further evaluation.  Otherwise no other complaints this present time.                          [place] Coma Scale Score: 15                  Patient History   No past medical history on file.  No past surgical history on file.  No family history on file.  Social History     Tobacco Use    Smoking status: Not on file    Smokeless tobacco: Not on file   Substance Use Topics    Alcohol use: Not on file    Drug use: Not on file       Physical Exam   ED Triage Vitals [01/29/24 1516]   Temperature Heart Rate Respirations BP   36.2 °C (97.2 °F) 93 16 138/76      Pulse Ox Temp Source Heart Rate Source Patient Position   98 % Temporal Monitor Sitting      BP Location FiO2 (%)     Right arm --       Physical Exam  Constitutional:       General: He is in acute distress (Mild).      Appearance: Normal appearance. He is ill-appearing.   HENT:      Head: Normocephalic and atraumatic.      Nose: Nose normal.   Eyes:      Extraocular Movements: Extraocular movements intact.      Conjunctiva/sclera: Conjunctivae normal.   Cardiovascular:      Rate and Rhythm: Normal rate and regular rhythm.   Pulmonary:      Effort: Pulmonary effort is normal. No respiratory distress.      Breath sounds: Normal breath sounds. No stridor. No wheezing.   Musculoskeletal:         General: Swelling (.  There are some mild swelling over the right lateral ribs) present. Normal range of motion.      Cervical back: Normal  range of motion.   Skin:     General: Skin is warm and dry.   Neurological:      General: No focal deficit present.      Mental Status: He is alert and oriented to person, place, and time. Mental status is at baseline.   Psychiatric:         Mood and Affect: Mood normal.         ED Course & MDM   Diagnoses as of 01/29/24 1812   Multifocal pneumonia       Medical Decision Making  A 43-year-old male patient comes in the emergency department today with complaints of not feeling well over the last 2 weeks.  He describes decreased appetite, congestion, cough.  Describes the cough as productive with sputum.  States that sputum has a very foul taste to it.  States he also has some right lung discomfort as well.  Very concerned about pneumonia.  This purpose comes in the emergency department today for further evaluation.  Otherwise no other complaints this present time.    Patient COVID-19, influenza, RSV.  Chest x-ray to rule out pneumonia    Patient's chest x-ray discussed possible right lung mass measuring 60 mm some pleural effusion.  CT study of the chest ordered.  CT study says there is consolidations in the right middle anterior segment of the right upper lobe.  Most likely due to multifocal pneumonia but cannot rule out pulmonary neoplasms.    Will treat patient for multifocal pneumonia.  But discussed with the patient he is very close follow-up.  Patient agrees with this plan expressed full verbal understanding.  All questions answered.    Historians patient    Diagnosis: Multifocal pneumonia          Labs Reviewed   SARS-COV-2 PCR, SYMPTOMATIC - Normal       Result Value    Coronavirus 2019, PCR Not Detected      Narrative:     This assay has received FDA Emergency Use Authorization (EUA) and is only authorized for the duration of time that circumstances exist to justify the authorization of the emergency use of in vitro diagnostic tests for the detection of SARS-CoV-2 virus and/or diagnosis of COVID-19 infection  under section 564(b)(1) of the Act, 21 U.S.C. 360bbb-3(b)(1). This assay is an in vitro diagnostic nucleic acid amplification test for the qualitative detection of SARS-CoV-2 from nasopharyngeal specimens and has been validated for use at [facility]. Negative results do not preclude COVID-19 infections and should not be used as the sole basis for diagnosis, treatment, or other management decisions.     INFLUENZA A AND B PCR - Normal    Flu A Result Not Detected      Flu B Result Not Detected      Narrative:     This assay is an in vitro diagnostic multiplex nucleic acid amplification test for the detection and discrimination of Influenza A & B from nasopharyngeal specimens, and has been validated for use at [facility]. Negative results do not preclude Influenza A/B infections, and should not be used as the sole basis for diagnosis, treatment, or other management decisions. If Influenza A/B and RSV PCR results are negative, testing for Parainfluenza virus, Adenovirus and Metapneumovirus is routinely performed for [facility] [place] and intensive care inpatients, and is available on other patients by placing an add-on request.   RSV PCR - Normal    RSV PCR Not Detected      Narrative:     This assay is an FDA-cleared, in vitro diagnostic nucleic acid amplification test for the detection of RSV from nasopharyngeal specimens, and has been validated for use at [facility]. Negative results do not preclude RSV infections, and should not be used as the sole basis for diagnosis, treatment, or other management decisions. If Influenza A/B and RSV PCR results are negative, testing for Parainfluenza virus, Adenovirus and Metapneumovirus is routinely performed for pediatric oncology and intensive care inpatients at [facility], and is available on other patients by placing an add-on request.            CT chest wo IV contrast   Final Result   1.  Spiculated  nodular densities and subsegmental consolidations in   the right middle lobe and anterior segment of the right upper lobe,   due to multifocal pneumonia or pulmonary neoplasms. Clinical   correlation is recommended. If clinically indicated, further   evaluation by tissue sampling may be obtained.        MACRO:   None        Signed by: Macario Rowell 1/29/2024 5:50 PM   Dictation workstation:   [id number]      XR chest 2 views   Final Result   1.  Possible right lung mass measuring 16 mm pleural effusion right   hilar adenopathy. Recommend further evaluation with chest CT                  MACRO:   None        Signed by: Joseph Schoenberger 1/29/2024 4:23 PM   Dictation workstation:   [id number]            Procedure  Procedures     Deejay Velásquez PA-C  01/29/24 1812

## 2024-02-18 ENCOUNTER — HOSPITAL ENCOUNTER (EMERGENCY)
Facility: HOSPITAL | Age: 44
Discharge: HOME | End: 2024-02-18
Payer: MEDICAID

## 2024-02-18 ENCOUNTER — APPOINTMENT (OUTPATIENT)
Dept: RADIOLOGY | Facility: HOSPITAL | Age: 44
End: 2024-02-18
Payer: MEDICAID

## 2024-02-18 VITALS
WEIGHT: 159.83 LBS | HEART RATE: 86 BPM | HEIGHT: 72 IN | BODY MASS INDEX: 21.65 KG/M2 | OXYGEN SATURATION: 96 % | DIASTOLIC BLOOD PRESSURE: 83 MMHG | TEMPERATURE: 98.1 F | SYSTOLIC BLOOD PRESSURE: 142 MMHG | RESPIRATION RATE: 16 BRPM

## 2024-02-18 DIAGNOSIS — R91.8 MASS OF LUNG: Primary | ICD-10-CM

## 2024-02-18 DIAGNOSIS — R93.5 ABNORMAL CT OF THE ABDOMEN: ICD-10-CM

## 2024-02-18 LAB
ALBUMIN SERPL BCP-MCNC: 3.8 G/DL (ref 3.4–5)
ALP SERPL-CCNC: 107 U/L (ref 33–120)
ALT SERPL W P-5'-P-CCNC: 4 U/L (ref 10–52)
ANION GAP SERPL CALC-SCNC: 15 MMOL/L (ref 10–20)
AST SERPL W P-5'-P-CCNC: 8 U/L (ref 9–39)
BASOPHILS # BLD AUTO: 0.07 X10*3/UL (ref 0–0.1)
BASOPHILS NFR BLD AUTO: 0.5 %
BILIRUB DIRECT SERPL-MCNC: 0 MG/DL (ref 0–0.3)
BILIRUB SERPL-MCNC: 0.4 MG/DL (ref 0–1.2)
BUN SERPL-MCNC: 10 MG/DL (ref 6–23)
CALCIUM SERPL-MCNC: 9.7 MG/DL (ref 8.6–10.3)
CHLORIDE SERPL-SCNC: 103 MMOL/L (ref 98–107)
CO2 SERPL-SCNC: 25 MMOL/L (ref 21–32)
CREAT SERPL-MCNC: 0.73 MG/DL (ref 0.5–1.3)
EGFRCR SERPLBLD CKD-EPI 2021: >90 ML/MIN/1.73M*2
EOSINOPHIL # BLD AUTO: 0.16 X10*3/UL (ref 0–0.7)
EOSINOPHIL NFR BLD AUTO: 1.2 %
ERYTHROCYTE [DISTWIDTH] IN BLOOD BY AUTOMATED COUNT: 16 % (ref 11.5–14.5)
GLUCOSE SERPL-MCNC: 105 MG/DL (ref 74–99)
HCT VFR BLD AUTO: 34.1 % (ref 41–52)
HGB BLD-MCNC: 10.9 G/DL (ref 13.5–17.5)
IMM GRANULOCYTES # BLD AUTO: 0.07 X10*3/UL (ref 0–0.7)
IMM GRANULOCYTES NFR BLD AUTO: 0.5 % (ref 0–0.9)
LACTATE SERPL-SCNC: 0.9 MMOL/L (ref 0.4–2)
LIPASE SERPL-CCNC: 6 U/L (ref 9–82)
LYMPHOCYTES # BLD AUTO: 2.01 X10*3/UL (ref 1.2–4.8)
LYMPHOCYTES NFR BLD AUTO: 14.6 %
MCH RBC QN AUTO: 29.1 PG (ref 26–34)
MCHC RBC AUTO-ENTMCNC: 32 G/DL (ref 32–36)
MCV RBC AUTO: 91 FL (ref 80–100)
MONOCYTES # BLD AUTO: 0.63 X10*3/UL (ref 0.1–1)
MONOCYTES NFR BLD AUTO: 4.6 %
NEUTROPHILS # BLD AUTO: 10.82 X10*3/UL (ref 1.2–7.7)
NEUTROPHILS NFR BLD AUTO: 78.6 %
NRBC BLD-RTO: 0 /100 WBCS (ref 0–0)
PLATELET # BLD AUTO: 349 X10*3/UL (ref 150–450)
POTASSIUM SERPL-SCNC: 4 MMOL/L (ref 3.5–5.3)
PROT SERPL-MCNC: 7.9 G/DL (ref 6.4–8.2)
RBC # BLD AUTO: 3.74 X10*6/UL (ref 4.5–5.9)
SODIUM SERPL-SCNC: 139 MMOL/L (ref 136–145)
WBC # BLD AUTO: 13.8 X10*3/UL (ref 4.4–11.3)

## 2024-02-18 PROCEDURE — 85025 COMPLETE CBC W/AUTO DIFF WBC: CPT | Performed by: NURSE PRACTITIONER

## 2024-02-18 PROCEDURE — 96376 TX/PRO/DX INJ SAME DRUG ADON: CPT

## 2024-02-18 PROCEDURE — 2500000004 HC RX 250 GENERAL PHARMACY W/ HCPCS (ALT 636 FOR OP/ED): Performed by: NURSE PRACTITIONER

## 2024-02-18 PROCEDURE — 99284 EMERGENCY DEPT VISIT MOD MDM: CPT | Mod: 25

## 2024-02-18 PROCEDURE — 80053 COMPREHEN METABOLIC PANEL: CPT | Performed by: NURSE PRACTITIONER

## 2024-02-18 PROCEDURE — 71260 CT THORAX DX C+: CPT

## 2024-02-18 PROCEDURE — 83605 ASSAY OF LACTIC ACID: CPT | Performed by: NURSE PRACTITIONER

## 2024-02-18 PROCEDURE — 83690 ASSAY OF LIPASE: CPT | Performed by: NURSE PRACTITIONER

## 2024-02-18 PROCEDURE — 2550000001 HC RX 255 CONTRASTS: Performed by: NURSE PRACTITIONER

## 2024-02-18 PROCEDURE — 82248 BILIRUBIN DIRECT: CPT | Performed by: NURSE PRACTITIONER

## 2024-02-18 PROCEDURE — 96374 THER/PROPH/DIAG INJ IV PUSH: CPT

## 2024-02-18 PROCEDURE — 2500000005 HC RX 250 GENERAL PHARMACY W/O HCPCS: Performed by: PHYSICIAN ASSISTANT

## 2024-02-18 PROCEDURE — 96375 TX/PRO/DX INJ NEW DRUG ADDON: CPT

## 2024-02-18 PROCEDURE — 2500000004 HC RX 250 GENERAL PHARMACY W/ HCPCS (ALT 636 FOR OP/ED): Performed by: PHYSICIAN ASSISTANT

## 2024-02-18 PROCEDURE — 74177 CT ABD & PELVIS W/CONTRAST: CPT | Mod: FOREIGN READ | Performed by: RADIOLOGY

## 2024-02-18 PROCEDURE — 99285 EMERGENCY DEPT VISIT HI MDM: CPT | Mod: 25

## 2024-02-18 PROCEDURE — 71260 CT THORAX DX C+: CPT | Mod: FOREIGN READ | Performed by: RADIOLOGY

## 2024-02-18 PROCEDURE — 36415 COLL VENOUS BLD VENIPUNCTURE: CPT | Performed by: NURSE PRACTITIONER

## 2024-02-18 RX ORDER — HYDROMORPHONE HYDROCHLORIDE 1 MG/ML
1 INJECTION, SOLUTION INTRAMUSCULAR; INTRAVENOUS; SUBCUTANEOUS ONCE
Status: COMPLETED | OUTPATIENT
Start: 2024-02-18 | End: 2024-02-18

## 2024-02-18 RX ORDER — LIDOCAINE 560 MG/1
1 PATCH PERCUTANEOUS; TOPICAL; TRANSDERMAL ONCE
Status: DISCONTINUED | OUTPATIENT
Start: 2024-02-18 | End: 2024-02-18 | Stop reason: HOSPADM

## 2024-02-18 RX ORDER — ONDANSETRON HYDROCHLORIDE 2 MG/ML
4 INJECTION, SOLUTION INTRAVENOUS ONCE
Status: COMPLETED | OUTPATIENT
Start: 2024-02-18 | End: 2024-02-18

## 2024-02-18 RX ORDER — MORPHINE SULFATE 4 MG/ML
4 INJECTION, SOLUTION INTRAMUSCULAR; INTRAVENOUS ONCE
Status: COMPLETED | OUTPATIENT
Start: 2024-02-18 | End: 2024-02-18

## 2024-02-18 RX ORDER — OXYCODONE AND ACETAMINOPHEN 5; 325 MG/1; MG/1
1 TABLET ORAL EVERY 6 HOURS PRN
Qty: 12 TABLET | Refills: 0 | Status: SHIPPED | OUTPATIENT
Start: 2024-02-18 | End: 2024-02-23

## 2024-02-18 RX ADMIN — IOHEXOL 75 ML: 350 INJECTION, SOLUTION INTRAVENOUS at 14:53

## 2024-02-18 RX ADMIN — LIDOCAINE 1 PATCH: 4 PATCH TOPICAL at 17:42

## 2024-02-18 RX ADMIN — HYDROMORPHONE HYDROCHLORIDE 0.5 MG: 1 INJECTION, SOLUTION INTRAMUSCULAR; INTRAVENOUS; SUBCUTANEOUS at 17:42

## 2024-02-18 RX ADMIN — ONDANSETRON 4 MG: 2 INJECTION INTRAMUSCULAR; INTRAVENOUS at 14:11

## 2024-02-18 RX ADMIN — MORPHINE SULFATE 4 MG: 4 INJECTION, SOLUTION INTRAMUSCULAR; INTRAVENOUS at 14:11

## 2024-02-18 RX ADMIN — HYDROMORPHONE HYDROCHLORIDE 1 MG: 1 INJECTION, SOLUTION INTRAMUSCULAR; INTRAVENOUS; SUBCUTANEOUS at 19:24

## 2024-02-18 ASSESSMENT — PAIN DESCRIPTION - DESCRIPTORS
DESCRIPTORS: ACHING;SHARP
DESCRIPTORS: ACHING;SHARP

## 2024-02-18 ASSESSMENT — PAIN DESCRIPTION - LOCATION
LOCATION: ABDOMEN

## 2024-02-18 ASSESSMENT — COLUMBIA-SUICIDE SEVERITY RATING SCALE - C-SSRS
1. IN THE PAST MONTH, HAVE YOU WISHED YOU WERE DEAD OR WISHED YOU COULD GO TO SLEEP AND NOT WAKE UP?: NO
6. HAVE YOU EVER DONE ANYTHING, STARTED TO DO ANYTHING, OR PREPARED TO DO ANYTHING TO END YOUR LIFE?: NO
2. HAVE YOU ACTUALLY HAD ANY THOUGHTS OF KILLING YOURSELF?: NO

## 2024-02-18 ASSESSMENT — PAIN DESCRIPTION - PAIN TYPE
TYPE: CHRONIC PAIN;ACUTE PAIN
TYPE: CHRONIC PAIN

## 2024-02-18 ASSESSMENT — PAIN - FUNCTIONAL ASSESSMENT
PAIN_FUNCTIONAL_ASSESSMENT: 0-10

## 2024-02-18 ASSESSMENT — PAIN SCALES - GENERAL
PAINLEVEL_OUTOF10: 8
PAINLEVEL_OUTOF10: 3
PAINLEVEL_OUTOF10: 8

## 2024-02-18 ASSESSMENT — PAIN DESCRIPTION - PROGRESSION: CLINICAL_PROGRESSION: NOT CHANGED

## 2024-02-18 ASSESSMENT — LIFESTYLE VARIABLES
HAVE YOU EVER FELT YOU SHOULD CUT DOWN ON YOUR DRINKING: NO
EVER FELT BAD OR GUILTY ABOUT YOUR DRINKING: NO
EVER HAD A DRINK FIRST THING IN THE MORNING TO STEADY YOUR NERVES TO GET RID OF A HANGOVER: NO
HAVE PEOPLE ANNOYED YOU BY CRITICIZING YOUR DRINKING: NO

## 2024-02-18 ASSESSMENT — PAIN DESCRIPTION - FREQUENCY: FREQUENCY: CONSTANT/CONTINUOUS

## 2024-02-18 ASSESSMENT — PAIN DESCRIPTION - ORIENTATION
ORIENTATION: RIGHT

## 2024-02-18 ASSESSMENT — PAIN DESCRIPTION - ONSET: ONSET: GRADUAL

## 2024-02-18 NOTE — ED PROVIDER NOTES
"HPI   Chief Complaint   Patient presents with    OTHER     Patient is having pain on the right side and the lump is hard and it is not getting any better.  Pain is getting worse.\"       43-year-old male presents emergency department, states he has had mass right anterior lateral ribs for some time, but over the last week or so its gotten significantly larger and more painful.  States pain anytime he touches the area, attempts to lay down.  This is taken for skin and states he had CT scan performed last month when he was diagnosed with pneumonia, but no mention was made of this mass.  States the area is grown larger, again more painful since then      History provided by:  Patient   used: No                        [place] Coma Scale Score: 15                     Patient History   Past Medical History:   Diagnosis Date    Liver cancer (CMS/HCC)     Lung cancer (CMS/HCC)      Past Surgical History:   Procedure Laterality Date    HAND SURGERY      left hand     No family history on file.  Social History     Tobacco Use    Smoking status: Every Day     Packs/day: .5     Types: Cigarettes    Smokeless tobacco: Never   Vaping Use    Vaping Use: Never used   Substance Use Topics    Alcohol use: Yes    Drug use: Yes     Types: Marijuana       Physical Exam   ED Triage Vitals [02/18/24 1237]   Temperature Heart Rate Respirations BP   36.7 °C (98.1 °F) 86 18 139/81      Pulse Ox Temp Source Heart Rate Source Patient Position   98 % Temporal Monitor Sitting      BP Location FiO2 (%)     Right arm --       Physical Exam  Physical Exam:  Constitutional: Vitals noted, no distress. Afebrile.   Cardiovascular: Regular, rate, rhythm, no murmur.   Pulmonary: Lungs clear bilaterally with good aeration. No adventitious breath sounds.   Chest wall: Firm, palpable mass that seems to be originating between ribs right anterior lateral, lower rib area.  It is a mildly erythematous, and again firm to " palpation.  Gastrointestinal: Soft, nonsurgical. Nontender. No peritoneal signs. Normoactive bowel sounds.   Musculoskeletal: No peripheral edema. Negative Homans bilaterally, no cords.   Skin: No rash.   Neuro: No focal neurologic deficits, NIH score of 0.    ED Course & MDM   Diagnoses as of 03/02/24 1502   Mass of lung   Abnormal CT of the abdomen     Labs Reviewed   CBC WITH AUTO DIFFERENTIAL - Abnormal       Result Value    WBC 13.8 (*)     nRBC 0.0      RBC 3.74 (*)     Hemoglobin 10.9 (*)     Hematocrit 34.1 (*)     MCV 91      MCH 29.1      MCHC 32.0      RDW 16.0 (*)     Platelets 349      Neutrophils % 78.6      Immature Granulocytes %, Automated 0.5      Lymphocytes % 14.6      Monocytes % 4.6      Eosinophils % 1.2      Basophils % 0.5      Neutrophils Absolute 10.82 (*)     Immature Granulocytes Absolute, Automated 0.07      Lymphocytes Absolute 2.01      Monocytes Absolute 0.63      Eosinophils Absolute 0.16      Basophils Absolute 0.07     BASIC METABOLIC PANEL - Abnormal    Glucose 105 (*)     Sodium 139      Potassium 4.0      Chloride 103      Bicarbonate 25      Anion Gap 15      Urea Nitrogen 10      Creatinine 0.73      eGFR >90      Calcium 9.7     LIPASE - Abnormal    Lipase 6 (*)     Narrative:     Venipuncture immediately after or during the administration of Metamizole may lead to falsely low results. Testing should be performed immediately prior to Metamizole dosing.   HEPATIC FUNCTION PANEL - Abnormal    Albumin 3.8      Bilirubin, Total 0.4      Bilirubin, Direct 0.0      Alkaline Phosphatase 107      ALT 4 (*)     AST 8 (*)     Total Protein 7.9     LACTATE - Normal    Lactate 0.9      Narrative:     Venipuncture immediately after or during the administration of Metamizole may lead to falsely low results. Testing should be performed immediately  prior to Metamizole dosing.        CT chest abdomen pelvis w IV contrast   Final Result   Interval decrease in the size of the pleural-based  masses involving   the right upper and middle lobes of the lung. Stable intrapulmonary   nodules within the right middle lobe. Interval decrease in the right   pleural effusion. Increasing size of the metastatic lesions involving   the right anterior chest wall and right subphrenic space along the   anterior surface of the liver. Adenopathy noted adjacent to the distal   esophagus.   Signed by Demario Saavedra MD           Medical Decision Making  I did review patient's previous CT imaging, I can see evidence of the mass the patient is referring to but there is no specific comment about this.  Does appear solid.    Repeat workup obtained, CBC shows a slightly elevated white count with hemoglobin of 10.9, metabolic panel unremarkable.    Sent for CT imaging of the chest, abdomen and pelvis for reevaluation.    Signed out to SERA Diop pending CT results.    Procedure  Procedures     NINA Denney-SHAZIA  02/18/24 1528       NINA Denney-CNP  03/02/24 1502

## 2024-02-18 NOTE — ED PROVIDER NOTES
"HPI   Chief Complaint   Patient presents with    OTHER     Patient is having pain on the right side and the lump is hard and it is not getting any better.  Pain is getting worse.\"       HPI                    Ashley Coma Scale Score: 15                     Patient History   History reviewed. No pertinent past medical history.  Past Surgical History:   Procedure Laterality Date    HAND SURGERY      left hand     No family history on file.  Social History     Tobacco Use    Smoking status: Every Day     Types: Cigarettes    Smokeless tobacco: Never   Vaping Use    Vaping Use: Never used   Substance Use Topics    Alcohol use: Yes    Drug use: Yes     Types: Marijuana       Physical Exam   ED Triage Vitals [02/18/24 1237]   Temperature Heart Rate Respirations BP   36.7 °C (98.1 °F) 86 18 139/81      Pulse Ox Temp Source Heart Rate Source Patient Position   98 % Temporal Monitor Sitting      BP Location FiO2 (%)     Right arm --       Physical Exam    ED Course & MDM   Diagnoses as of 02/18/24 2039   Mass of lung   Abnormal CT of the abdomen     Labs Reviewed   CBC WITH AUTO DIFFERENTIAL - Abnormal       Result Value    WBC 13.8 (*)     nRBC 0.0      RBC 3.74 (*)     Hemoglobin 10.9 (*)     Hematocrit 34.1 (*)     MCV 91      MCH 29.1      MCHC 32.0      RDW 16.0 (*)     Platelets 349      Neutrophils % 78.6      Immature Granulocytes %, Automated 0.5      Lymphocytes % 14.6      Monocytes % 4.6      Eosinophils % 1.2      Basophils % 0.5      Neutrophils Absolute 10.82 (*)     Immature Granulocytes Absolute, Automated 0.07      Lymphocytes Absolute 2.01      Monocytes Absolute 0.63      Eosinophils Absolute 0.16      Basophils Absolute 0.07     BASIC METABOLIC PANEL - Abnormal    Glucose 105 (*)     Sodium 139      Potassium 4.0      Chloride 103      Bicarbonate 25      Anion Gap 15      Urea Nitrogen 10      Creatinine 0.73      eGFR >90      Calcium 9.7     LIPASE - Abnormal    Lipase 6 (*)     Narrative:     " Venipuncture immediately after or during the administration of Metamizole may lead to falsely low results. Testing should be performed immediately prior to Metamizole dosing.   HEPATIC FUNCTION PANEL - Abnormal    Albumin 3.8      Bilirubin, Total 0.4      Bilirubin, Direct 0.0      Alkaline Phosphatase 107      ALT 4 (*)     AST 8 (*)     Total Protein 7.9     LACTATE - Normal    Lactate 0.9      Narrative:     Venipuncture immediately after or during the administration of Metamizole may lead to falsely low results. Testing should be performed immediately  prior to Metamizole dosing.      CT chest abdomen pelvis w IV contrast   Final Result   Interval decrease in the size of the pleural-based masses involving   the right upper and middle lobes of the lung. Stable intrapulmonary   nodules within the right middle lobe. Interval decrease in the right   pleural effusion. Increasing size of the metastatic lesions involving   the right anterior chest wall and right subphrenic space along the   anterior surface of the liver. Adenopathy noted adjacent to the distal   esophagus.   Signed by Demario Saavedra MD            Medical Decision Making  Patient handed off to me from Kinsey Mejia NP at 1530 as labs have been finalized, but CT results are still pending.  Final read of CT chest abdomen pelvis with contrast shows findings concerning of metastatic cancer.  Patient very upset with wait time as imaging took 2 hours to read and also upset that he was required to pay $200 for the office visit at [facility].  He states that [facility] also told him that referring to them was inappropriate and he is also upset about this.  Patient is still in pain will be given lidocaine patch and Dilaudid.  Case discussed with the hospitalist for admission for pain control with dr. Simms, who recommends discussing with oncology.  Patient states that he does not have insurance at this time and does not want transferred  if this is recommended.  Patient has been waiting here for over 2 hours waiting for oncology consulted and he is very upset and wishes to be discharged home with pain medication and to be called letting him know about his outpatient appointment.  Transfer center stated that someone here canceled the transfer.  Case discussed with Dr. Pal from oncology and she states that they can see the patient as an outpatient, but he will have to pay out-of-pocket.  She states that she would be happy to see the patient and states that she knows [facility] will see patients without insurance.  I discussed this with the patient and discussed that I will consult [facility] about an appointment.  It is now 2130 and  informs me that the transfer center cannot get a hold of anyone and they will not talk to us unless there is a confirmed diagnosis of cancer.  She suggested for us to call the outpatient line.   states that she will call the patient let them know that [facility] can assist in financial assistance as he needs to follow-up with outpatient oncology.    Procedure  Procedures     Chantal Solitario PA-C  02/18/24 2130

## 2024-02-21 ENCOUNTER — HOSPITAL ENCOUNTER (EMERGENCY)
Facility: HOSPITAL | Age: 44
Discharge: HOME | End: 2024-02-21
Payer: MEDICAID

## 2024-02-21 VITALS
OXYGEN SATURATION: 98 % | BODY MASS INDEX: 22.35 KG/M2 | DIASTOLIC BLOOD PRESSURE: 68 MMHG | TEMPERATURE: 97.7 F | HEART RATE: 98 BPM | WEIGHT: 165 LBS | HEIGHT: 72 IN | SYSTOLIC BLOOD PRESSURE: 151 MMHG | RESPIRATION RATE: 20 BRPM

## 2024-02-21 DIAGNOSIS — R91.8 LUNG MASS: Primary | ICD-10-CM

## 2024-02-21 PROCEDURE — 99283 EMERGENCY DEPT VISIT LOW MDM: CPT

## 2024-02-21 PROCEDURE — 2500000001 HC RX 250 WO HCPCS SELF ADMINISTERED DRUGS (ALT 637 FOR MEDICARE OP): Performed by: PHYSICIAN ASSISTANT

## 2024-02-21 RX ORDER — IBUPROFEN 600 MG/1
600 TABLET ORAL ONCE
Status: COMPLETED | OUTPATIENT
Start: 2024-02-21 | End: 2024-02-21

## 2024-02-21 RX ORDER — OXYCODONE AND ACETAMINOPHEN 5; 325 MG/1; MG/1
1 TABLET ORAL ONCE
Status: COMPLETED | OUTPATIENT
Start: 2024-02-21 | End: 2024-02-21

## 2024-02-21 RX ORDER — OXYCODONE AND ACETAMINOPHEN 5; 325 MG/1; MG/1
1 TABLET ORAL EVERY 6 HOURS PRN
Qty: 12 TABLET | Refills: 0 | Status: ON HOLD | OUTPATIENT
Start: 2024-02-22 | End: 2024-03-05

## 2024-02-21 RX ADMIN — OXYCODONE HYDROCHLORIDE AND ACETAMINOPHEN 1 TABLET: 5; 325 TABLET ORAL at 13:27

## 2024-02-21 RX ADMIN — IBUPROFEN 600 MG: 600 TABLET, FILM COATED ORAL at 13:27

## 2024-02-21 ASSESSMENT — COLUMBIA-SUICIDE SEVERITY RATING SCALE - C-SSRS
1. IN THE PAST MONTH, HAVE YOU WISHED YOU WERE DEAD OR WISHED YOU COULD GO TO SLEEP AND NOT WAKE UP?: NO
2. HAVE YOU ACTUALLY HAD ANY THOUGHTS OF KILLING YOURSELF?: NO
6. HAVE YOU EVER DONE ANYTHING, STARTED TO DO ANYTHING, OR PREPARED TO DO ANYTHING TO END YOUR LIFE?: NO

## 2024-02-21 ASSESSMENT — PAIN DESCRIPTION - LOCATION: LOCATION: ABDOMEN

## 2024-02-21 ASSESSMENT — PAIN SCALES - GENERAL: PAINLEVEL_OUTOF10: 7

## 2024-02-21 ASSESSMENT — PAIN DESCRIPTION - FREQUENCY: FREQUENCY: CONSTANT/CONTINUOUS

## 2024-02-21 ASSESSMENT — PAIN DESCRIPTION - ONSET: ONSET: GRADUAL

## 2024-02-21 ASSESSMENT — PAIN DESCRIPTION - ORIENTATION: ORIENTATION: RIGHT

## 2024-02-21 ASSESSMENT — PAIN DESCRIPTION - DESCRIPTORS: DESCRIPTORS: ACHING;SHARP

## 2024-02-21 ASSESSMENT — PAIN DESCRIPTION - PAIN TYPE: TYPE: ACUTE PAIN

## 2024-02-21 ASSESSMENT — PAIN - FUNCTIONAL ASSESSMENT: PAIN_FUNCTIONAL_ASSESSMENT: 0-10

## 2024-02-21 ASSESSMENT — PAIN DESCRIPTION - PROGRESSION: CLINICAL_PROGRESSION: NOT CHANGED

## 2024-02-21 NOTE — ED PROVIDER NOTES
HPI   Chief Complaint   Patient presents with    Med Refill     Pt. States he is seeing the oncologist on 2/26/2024 to get pain medication.       A 43-year-old male patient comes into the emergency department today with complaints of right lateral rib pain secondary to recently diagnosed lung and liver mass.  States he is seeing oncology on Monday for further evaluation.  States he has been getting Percocet but ran out.  States he does not have a primary care doctor to see to have a prescription filled for this until he sees oncology.  Is here for the purpose of pain control.  Rates pain currently 7 out of 10 on the pain scale.  He otherwise has no other complaints this present time for this purpose comes emergency department today for the evaluation.                          No data recorded                   Patient History   Past Medical History:   Diagnosis Date    Liver cancer (CMS/HCC)     Lung cancer (CMS/HCC)      Past Surgical History:   Procedure Laterality Date    HAND SURGERY      left hand     No family history on file.  Social History     Tobacco Use    Smoking status: Every Day     Packs/day: .5     Types: Cigarettes    Smokeless tobacco: Never   Vaping Use    Vaping Use: Never used   Substance Use Topics    Alcohol use: Yes    Drug use: Yes     Types: Marijuana       Physical Exam   ED Triage Vitals [02/21/24 1256]   Temperature Heart Rate Respirations BP   36.5 °C (97.7 °F) 98 20 151/68      Pulse Ox Temp Source Heart Rate Source Patient Position   98 % Temporal Monitor Sitting      BP Location FiO2 (%)     Right arm --       Physical Exam  Constitutional:       General: He is in acute distress (Moderate distress).      Appearance: Normal appearance. He is not ill-appearing or diaphoretic.   HENT:      Head: Normocephalic and atraumatic.      Nose: Nose normal.   Eyes:      Extraocular Movements: Extraocular movements intact.      Conjunctiva/sclera: Conjunctivae normal.      Pupils: Pupils are  equal, round, and reactive to light.   Cardiovascular:      Rate and Rhythm: Normal rate and regular rhythm.   Pulmonary:      Effort: Pulmonary effort is normal. No respiratory distress.      Breath sounds: Normal breath sounds. No stridor. No wheezing.   Musculoskeletal:         General: Normal range of motion.      Cervical back: Normal range of motion.   Skin:     General: Skin is warm and dry.      Comments: Edema over the right lateral distal ribs with tenderness   Neurological:      General: No focal deficit present.      Mental Status: He is alert and oriented to person, place, and time. Mental status is at baseline.   Psychiatric:         Mood and Affect: Mood normal.         ED Course & MDM   Diagnoses as of 02/21/24 1316   Lung mass       Medical Decision Making  A 43-year-old male patient comes into the emergency department today with complaints of right lateral rib pain secondary to recently diagnosed lung and liver mass.  States he is seeing oncology on Monday for further evaluation.  States he has been getting Percocet but ran out.  States he does not have a primary care doctor to see to have a prescription filled for this until he sees oncology.  Is here for the purpose of pain control.  Rates pain currently 7 out of 10 on the pain scale.  He otherwise has no other complaints this present time for this purpose comes emergency department today for the evaluation.    P.o. Percocet p.o. ibuprofen ordered for the patient here in the emergency department.  Will discharge patient home with prescription for Percocet.  Patient has NSAIDs at home encouraged him to take these as well.    Historian is the patient    Diagnosis: Lung mass        Procedure  Procedures     Deejay Velásquez PA-C  02/21/24 1311

## 2024-02-21 NOTE — Clinical Note
Ritesh Luther was seen and treated in our emergency department on 2/21/2024.  He may return to work on 02/25/2024.       If you have any questions or concerns, please don't hesitate to call.      Deejay Velásquez PA-C

## 2024-02-26 ENCOUNTER — HOSPITAL ENCOUNTER (EMERGENCY)
Facility: HOSPITAL | Age: 44
Discharge: HOME | End: 2024-02-26
Attending: STUDENT IN AN ORGANIZED HEALTH CARE EDUCATION/TRAINING PROGRAM
Payer: MEDICAID

## 2024-02-26 ENCOUNTER — SOCIAL WORK (OUTPATIENT)
Dept: CASE MANAGEMENT | Facility: HOSPITAL | Age: 44
End: 2024-02-26
Payer: MEDICAID

## 2024-02-26 VITALS
BODY MASS INDEX: 21.35 KG/M2 | TEMPERATURE: 98.2 F | RESPIRATION RATE: 18 BRPM | DIASTOLIC BLOOD PRESSURE: 79 MMHG | HEIGHT: 72 IN | WEIGHT: 157.63 LBS | OXYGEN SATURATION: 97 % | SYSTOLIC BLOOD PRESSURE: 129 MMHG | HEART RATE: 89 BPM

## 2024-02-26 DIAGNOSIS — R22.2 CHEST WALL MASS: Primary | ICD-10-CM

## 2024-02-26 PROCEDURE — 2500000005 HC RX 250 GENERAL PHARMACY W/O HCPCS: Performed by: STUDENT IN AN ORGANIZED HEALTH CARE EDUCATION/TRAINING PROGRAM

## 2024-02-26 PROCEDURE — 99283 EMERGENCY DEPT VISIT LOW MDM: CPT

## 2024-02-26 PROCEDURE — 2500000001 HC RX 250 WO HCPCS SELF ADMINISTERED DRUGS (ALT 637 FOR MEDICARE OP): Performed by: STUDENT IN AN ORGANIZED HEALTH CARE EDUCATION/TRAINING PROGRAM

## 2024-02-26 RX ORDER — LIDOCAINE 560 MG/1
1 PATCH PERCUTANEOUS; TOPICAL; TRANSDERMAL ONCE
Status: DISCONTINUED | OUTPATIENT
Start: 2024-02-26 | End: 2024-02-26 | Stop reason: HOSPADM

## 2024-02-26 RX ORDER — OXYCODONE HYDROCHLORIDE 5 MG/1
5 TABLET ORAL ONCE
Status: COMPLETED | OUTPATIENT
Start: 2024-02-26 | End: 2024-02-26

## 2024-02-26 RX ORDER — NAPROXEN 250 MG/1
250 TABLET ORAL
Qty: 20 TABLET | Refills: 0 | Status: SHIPPED | OUTPATIENT
Start: 2024-02-26 | End: 2024-03-07

## 2024-02-26 RX ORDER — ACETAMINOPHEN 500 MG
1000 TABLET ORAL 3 TIMES DAILY
Qty: 60 TABLET | Refills: 0 | Status: SHIPPED | OUTPATIENT
Start: 2024-02-26 | End: 2024-03-07

## 2024-02-26 RX ORDER — ACETAMINOPHEN 325 MG/1
975 TABLET ORAL ONCE
Status: COMPLETED | OUTPATIENT
Start: 2024-02-26 | End: 2024-02-26

## 2024-02-26 RX ORDER — OXYCODONE HYDROCHLORIDE 5 MG/1
5 TABLET ORAL EVERY 6 HOURS PRN
Qty: 12 TABLET | Refills: 0 | Status: SHIPPED | OUTPATIENT
Start: 2024-02-26 | End: 2024-03-05 | Stop reason: HOSPADM

## 2024-02-26 RX ADMIN — LIDOCAINE 1 PATCH: 4 PATCH TOPICAL at 17:18

## 2024-02-26 RX ADMIN — ACETAMINOPHEN 975 MG: 325 TABLET ORAL at 17:18

## 2024-02-26 RX ADMIN — OXYCODONE HYDROCHLORIDE 5 MG: 5 TABLET ORAL at 17:17

## 2024-02-26 ASSESSMENT — PAIN SCALES - GENERAL: PAINLEVEL_OUTOF10: 7

## 2024-02-26 ASSESSMENT — LIFESTYLE VARIABLES
EVER HAD A DRINK FIRST THING IN THE MORNING TO STEADY YOUR NERVES TO GET RID OF A HANGOVER: NO
HAVE PEOPLE ANNOYED YOU BY CRITICIZING YOUR DRINKING: NO
HAVE YOU EVER FELT YOU SHOULD CUT DOWN ON YOUR DRINKING: NO
EVER FELT BAD OR GUILTY ABOUT YOUR DRINKING: NO

## 2024-02-26 ASSESSMENT — COLUMBIA-SUICIDE SEVERITY RATING SCALE - C-SSRS
2. HAVE YOU ACTUALLY HAD ANY THOUGHTS OF KILLING YOURSELF?: NO
1. IN THE PAST MONTH, HAVE YOU WISHED YOU WERE DEAD OR WISHED YOU COULD GO TO SLEEP AND NOT WAKE UP?: NO
6. HAVE YOU EVER DONE ANYTHING, STARTED TO DO ANYTHING, OR PREPARED TO DO ANYTHING TO END YOUR LIFE?: NO

## 2024-02-26 ASSESSMENT — PAIN - FUNCTIONAL ASSESSMENT: PAIN_FUNCTIONAL_ASSESSMENT: 0-10

## 2024-02-26 NOTE — ED PROVIDER NOTES
HPI: The patient is a 43-year-old man with history of tobacco use who presents to the Emergency Department with a chief complaint of chest pain.  Patient reports that he was recently diagnosed with lung masses and 1 mass is subsequently gone to his chest wall and has been enlarging over the last month.  He reports he is already had a CT scan since the mass started swelling on his chest wall.  He reports he does not have insurance which is made it difficult for him to get care and he is currently trying to get follow-up with a primary doctor, with a pain specialist as well and has further follow-up with oncology and has already worked with social work to get himself insurance will consider getting treated.  He saw oncology today who referred him here for further analgesia.  Patient reports he has no chest pain outside of where his lesion is on his chest wall and reports there is no exertional symptoms pleuritic pain coughing up blood swelling his legs recent travel or trauma or hemoptysis or black or bloody bowel movements.  Patient reports he is only here for analgesia and is only been taking Motrin at home given that he ran out of his prescription for Percocet.     PAST MEDICAL HISTORY:  as per HPI  ALLERGIES:  as per HPI  MEDICATIONS:  as per HPI  FAMILY HISTORY: as per HPI  SURGICAL HISTORY: as per HPI  SOCIAL HISTORY: as per HPI     PHYSICAL EXAM:  VITAL SIGNS: Nursing notes reviewed.  GENERAL:  Alert and interactive  EYES:   Eyes track.  ENT:  Airway patent.  RESPIRATORY:  Nonlabored breathing.  Clear bilaterally.  CARDIOVASCULAR:  [Regular rate.] [Regular rhythm.]  GASTROINTESTINAL:  No distension.  MUSCULOSKELETAL:  No deformity.   NEUROLOGICAL:  Awake.  SKIN:  Dry.  Large erythematous mass on the right anterior chest wall.  No fluctuance.         MEDICAL DECISION MAKING (MDM):       REVIEW OF OLD RECORDS: I reviewed the oncology note from earlier today as well as the ED visits over the last couple months and  the CT on second read from 1/29/2024 and 2/18/2024     SUMMARY:  The patient is admitted to the Emergency Department for evaluation of above. Complete history and physical examination was performed by me.  Patient presents with pain likely related to an undiagnosed malignancy.  I did discuss this with the patient.  Review of the images shows that the mass is not an abscess and is the metastatic disease likely from his undiagnosed lung cancer especially given the whole clinical picture and him having history of tobacco use.  I did discuss that is possible for other causes of his pain and recommended that we at least get an EKG if not blood work but the patient refused.  I did discuss that I could prescribe him a short course of oxycodone but also want to optimize his pain with Tylenol around-the-clock and Motrin around-the-clock as well as lidocaine patches.  Patient was given prescriptions for these and I did discuss the side effects of taking his medications and ways to mitigate these.  I emphasized that the patient will still need to follow-up and that he should have strict return precautions especially if he develops something that would point towards a PE and listed off those symptoms that he was aware.  I did discuss that without any workup here I cannot rule out other pathology that could be going on concomitantly although my suspicion for this was low.  Patient expressed understanding this and still did not want me to provide any further evaluation and has capacity make this decision.  The work-up and plan were discussed with the patient/caregiver and questions were answered regarding the ED visit.  Educational materials were provided as well as return precautions including returning for any persisting or worsening symptoms.  Patient was recommended to follow-up with PCP in the next few days in addition to any potential specialists that were discussed.  The patient/family expressed understanding and agreed to  the described plan.  Patient was discharged in stable condition.     DIAGNOSIS:    Chest wall mass     DISPOSITION:    1) discharged  [2) Please see Exit Care and discharge instructions for complete details.]     Bradly Dias MD  02/26/24 3081

## 2024-02-26 NOTE — PROGRESS NOTES
Social Work Note  2/26/2024 SHANTELL notified by  this am that patient was coming early to see how to have his MD appointment occur.  Patient has no insurance and is not able to pay the amount quoted by financial counselor.  SHANTELL talked with patient and wife.  SW found out patient has not been diagnosed yet and MD would not be able to provide pain medication at this time.  SHANTELL talked with manager/administration. Our suggestion was for patient to be seen at [facility] so his care could move faster than SW sending in a medicaid application today.  SHANTELL provided him an application and encouraged him to work on this.  SHANTELL called patient back later to see how he was feeling.  SW offered to send in the application for him.  He stated he hadn't gone to any ED for pain assistance but he did get the application finished.  SHANTELL also let him know that the financial counselor suggested checking into hospital assistance with 3 months of paystubs for wife or him if he has been working.  Wife was at work at the time.  SHANTELL provided this patient contact information for assistance.  Yandy Baltazar was checking into assistance being started at [facility] since he had been at their ED 3 times recently.  SW will remain available to assist patient.  Catherine Dumont, MSW, LSW

## 2024-02-29 ENCOUNTER — SOCIAL WORK (OUTPATIENT)
Dept: CASE MANAGEMENT | Facility: HOSPITAL | Age: 44
End: 2024-02-29
Payer: COMMERCIAL

## 2024-02-29 NOTE — PROGRESS NOTES
Social Work Note  2/29/2024 SW talked with patient today.  Medicaid application was mailed in yesterday.  Patient is very frustrated over not being seen and feeling like his pain has not been addressed.  SW took patient's concerns to team and administration by email today.  SW will remain available to assist patient.  Catherine Dumont MSW, LSW

## 2024-02-29 NOTE — PROGRESS NOTES
Social Work Note  2/29/2024 SW left a message yesterday reaching out to see if he wanted to get his medicaid application here so it could be emailed in.  SW could also let [employer] know that the application was sent in. When we talked on 2/26 patient wanted to mail the Medicaid application in.   SW also wishes to have the [employer] information and income verification.  SW has not heard back at this time.  HRS emailed to see if they have started the Medicaid application on their end with patient being at the [facility].  SW will remain available to assist patient.  Catherine Dumont, MSW, LSW

## 2024-03-01 ENCOUNTER — HOSPITAL ENCOUNTER (EMERGENCY)
Facility: HOSPITAL | Age: 44
Discharge: HOME | End: 2024-03-01
Payer: COMMERCIAL

## 2024-03-01 VITALS
HEIGHT: 72 IN | WEIGHT: 155 LBS | DIASTOLIC BLOOD PRESSURE: 83 MMHG | BODY MASS INDEX: 20.99 KG/M2 | TEMPERATURE: 98.1 F | OXYGEN SATURATION: 99 % | RESPIRATION RATE: 18 BRPM | SYSTOLIC BLOOD PRESSURE: 139 MMHG | HEART RATE: 95 BPM

## 2024-03-01 DIAGNOSIS — R19.00 FLANK MASS: Primary | ICD-10-CM

## 2024-03-01 PROCEDURE — 2500000004 HC RX 250 GENERAL PHARMACY W/ HCPCS (ALT 636 FOR OP/ED): Performed by: PHYSICIAN ASSISTANT

## 2024-03-01 PROCEDURE — 96372 THER/PROPH/DIAG INJ SC/IM: CPT

## 2024-03-01 PROCEDURE — 96372 THER/PROPH/DIAG INJ SC/IM: CPT | Performed by: PHYSICIAN ASSISTANT

## 2024-03-01 PROCEDURE — 2500000001 HC RX 250 WO HCPCS SELF ADMINISTERED DRUGS (ALT 637 FOR MEDICARE OP): Performed by: PHYSICIAN ASSISTANT

## 2024-03-01 PROCEDURE — 99283 EMERGENCY DEPT VISIT LOW MDM: CPT

## 2024-03-01 RX ORDER — OXYCODONE AND ACETAMINOPHEN 5; 325 MG/1; MG/1
1 TABLET ORAL EVERY 6 HOURS PRN
Qty: 12 TABLET | Refills: 0 | Status: ON HOLD | OUTPATIENT
Start: 2024-03-01 | End: 2024-03-02 | Stop reason: ALTCHOICE

## 2024-03-01 RX ORDER — CLINDAMYCIN HYDROCHLORIDE 150 MG/1
300 CAPSULE ORAL ONCE
Status: COMPLETED | OUTPATIENT
Start: 2024-03-01 | End: 2024-03-01

## 2024-03-01 RX ORDER — CLINDAMYCIN HYDROCHLORIDE 150 MG/1
300 CAPSULE ORAL EVERY 6 HOURS
Qty: 56 CAPSULE | Refills: 0 | Status: ON HOLD | OUTPATIENT
Start: 2024-03-01 | End: 2024-03-02 | Stop reason: ALTCHOICE

## 2024-03-01 RX ORDER — FENTANYL CITRATE 50 UG/ML
50 INJECTION, SOLUTION INTRAMUSCULAR; INTRAVENOUS ONCE
Status: COMPLETED | OUTPATIENT
Start: 2024-03-01 | End: 2024-03-01

## 2024-03-01 RX ORDER — BACITRACIN 500 [USP'U]/G
OINTMENT TOPICAL ONCE
Status: COMPLETED | OUTPATIENT
Start: 2024-03-01 | End: 2024-03-01

## 2024-03-01 RX ORDER — IBUPROFEN 600 MG/1
TABLET ORAL
Qty: 20 TABLET | Refills: 0 | Status: ON HOLD | OUTPATIENT
Start: 2024-03-01 | End: 2024-03-02 | Stop reason: ALTCHOICE

## 2024-03-01 RX ADMIN — BACITRACIN: 500 OINTMENT TOPICAL at 12:58

## 2024-03-01 RX ADMIN — CLINDAMYCIN HYDROCHLORIDE 300 MG: 150 CAPSULE ORAL at 12:58

## 2024-03-01 RX ADMIN — FENTANYL CITRATE 50 MCG: 50 INJECTION, SOLUTION INTRAMUSCULAR; INTRAVENOUS at 12:58

## 2024-03-01 ASSESSMENT — PAIN DESCRIPTION - PAIN TYPE: TYPE: ACUTE PAIN

## 2024-03-01 ASSESSMENT — COLUMBIA-SUICIDE SEVERITY RATING SCALE - C-SSRS
6. HAVE YOU EVER DONE ANYTHING, STARTED TO DO ANYTHING, OR PREPARED TO DO ANYTHING TO END YOUR LIFE?: NO
2. HAVE YOU ACTUALLY HAD ANY THOUGHTS OF KILLING YOURSELF?: NO
1. IN THE PAST MONTH, HAVE YOU WISHED YOU WERE DEAD OR WISHED YOU COULD GO TO SLEEP AND NOT WAKE UP?: NO

## 2024-03-01 ASSESSMENT — PAIN - FUNCTIONAL ASSESSMENT
PAIN_FUNCTIONAL_ASSESSMENT: 0-10
PAIN_FUNCTIONAL_ASSESSMENT: 0-10

## 2024-03-01 ASSESSMENT — PAIN SCALES - GENERAL
PAINLEVEL_OUTOF10: 9
PAINLEVEL_OUTOF10: 9

## 2024-03-01 NOTE — Clinical Note
Ritesh Luther was seen and treated in our emergency department on 3/1/2024.  He may return to work on 03/03/2024.       If you have any questions or concerns, please don't hesitate to call.      Basilia Michaels PA-C

## 2024-03-01 NOTE — ED PROVIDER NOTES
"HPI   Chief Complaint   Patient presents with    pain     Pt wants pain meds for a \"lump\" on his right side        43-year-old male presents to the emergency department for request of pain medication and referral.  Patient states he has had a large lump in the right flank area and has been seen here 5 times about it.  He was told that he has liver and lung cancer and was referred to oncology.  However, he states that nowhere will take him because he does not have insurance.  He is trying to get his insurance figured out but hematology oncology a full area has told him that if he gets a referral to them, they will see him without insurance.  He states he also needs pain medications.  He has not tried anything over-the-counter but was given lidocaine patches and he states \"they burned my skin \".  He denies fevers or flulike symptoms, purulent drainage from the lump on his flank.                          Ashley Coma Scale Score: 15                     Patient History   Past Medical History:   Diagnosis Date    Liver cancer (CMS/HCC)     Lung cancer (CMS/HCC)      Past Surgical History:   Procedure Laterality Date    HAND SURGERY      left hand     No family history on file.  Social History     Tobacco Use    Smoking status: Every Day     Packs/day: .5     Types: Cigarettes    Smokeless tobacco: Never   Vaping Use    Vaping Use: Never used   Substance Use Topics    Alcohol use: Yes    Drug use: Yes     Types: Marijuana       Physical Exam   ED Triage Vitals [03/01/24 1214]   Temperature Heart Rate Respirations BP   36.7 °C (98.1 °F) (!) 127 18 139/83      Pulse Ox Temp Source Heart Rate Source Patient Position   99 % Temporal Monitor --      BP Location FiO2 (%)     -- --       Physical Exam  Vitals and nursing note reviewed.   Constitutional:       General: He is not in acute distress.  HENT:      Head: Atraumatic.      Mouth/Throat:      Mouth: Mucous membranes are moist.      Pharynx: Oropharynx is clear.   Eyes:    "   Extraocular Movements: Extraocular movements intact.      Conjunctiva/sclera: Conjunctivae normal.      Pupils: Pupils are equal, round, and reactive to light.   Cardiovascular:      Rate and Rhythm: Normal rate and regular rhythm.      Pulses: Normal pulses.   Pulmonary:      Effort: Pulmonary effort is normal. No respiratory distress.      Breath sounds: Normal breath sounds.   Abdominal:      General: There is no distension.      Palpations: Abdomen is soft.      Tenderness: There is no abdominal tenderness. There is no guarding or rebound.   Musculoskeletal:         General: No deformity.      Cervical back: Neck supple.   Skin:     General: Skin is warm and dry.      Comments: Large firm mass to the right flank which is erythematous with surface open skin and scabbing.  Questionable very mild central fluctuance.   Neurological:      Mental Status: He is alert and oriented to person, place, and time. Mental status is at baseline.      Cranial Nerves: No cranial nerve deficit.      Sensory: No sensory deficit.      Motor: No weakness.   Psychiatric:         Mood and Affect: Mood normal.         Behavior: Behavior normal.         ED Course & MDM   Diagnoses as of 03/01/24 1410   Flank mass       Medical Decision Making  43-year-old male presents the emergency department for pain medication for a right flank mass.  Patient has had the mass evaluated with a CT which showed likely lung and liver cancer.  He did see an oncologist who referred him for a biopsy but he has been unable to complete this due to financial constraints.  On my exam, he has a large, firm, red, tender mass of the right flank which he states is unchanged in size and color.  He does have some surface skin disruption and scabbing which patient states is due to a burn from a lidocaine patch.  There is questionable central fluctuance but patient declines any further workup then physical exam.  Patient treated here with IM fentanyl and bacitracin  ointment with a nonadherent dressing and clindamycin.  I prescribed the patient a short course of oxycodone, clindamycin and ibuprofen  I gave the patient the requested referral.  Discussed results with patient and/or family/friend and recommended close follow up with primary care or specialist.  Reviewed return precautions at length.  I answered all questions.           Procedure  Procedures     Basilia Michaels PA-C  03/01/24 1454

## 2024-03-02 ENCOUNTER — HOSPITAL ENCOUNTER (INPATIENT)
Facility: HOSPITAL | Age: 44
LOS: 3 days | Discharge: HOME | End: 2024-03-05
Attending: EMERGENCY MEDICINE | Admitting: STUDENT IN AN ORGANIZED HEALTH CARE EDUCATION/TRAINING PROGRAM
Payer: COMMERCIAL

## 2024-03-02 ENCOUNTER — APPOINTMENT (OUTPATIENT)
Dept: RADIOLOGY | Facility: HOSPITAL | Age: 44
End: 2024-03-02
Payer: COMMERCIAL

## 2024-03-02 DIAGNOSIS — T14.8XXA WOUND INFECTION: ICD-10-CM

## 2024-03-02 DIAGNOSIS — R91.8 LUNG MASS: ICD-10-CM

## 2024-03-02 DIAGNOSIS — L08.9 WOUND INFECTION: ICD-10-CM

## 2024-03-02 DIAGNOSIS — R22.2 CHEST WALL MASS: Primary | ICD-10-CM

## 2024-03-02 DIAGNOSIS — A41.9 SEPSIS, DUE TO UNSPECIFIED ORGANISM, UNSPECIFIED WHETHER ACUTE ORGAN DYSFUNCTION PRESENT (MULTI): ICD-10-CM

## 2024-03-02 LAB
ALBUMIN SERPL BCP-MCNC: 3.4 G/DL (ref 3.4–5)
ALP SERPL-CCNC: 132 U/L (ref 33–120)
ALT SERPL W P-5'-P-CCNC: 4 U/L (ref 10–52)
ANION GAP SERPL CALC-SCNC: 15 MMOL/L (ref 10–20)
APPEARANCE UR: CLEAR
AST SERPL W P-5'-P-CCNC: 5 U/L (ref 9–39)
BASOPHILS # BLD AUTO: 0.1 X10*3/UL (ref 0–0.1)
BASOPHILS NFR BLD AUTO: 0.5 %
BILIRUB SERPL-MCNC: 0.6 MG/DL (ref 0–1.2)
BILIRUB UR STRIP.AUTO-MCNC: NEGATIVE MG/DL
BUN SERPL-MCNC: 20 MG/DL (ref 6–23)
CALCIUM SERPL-MCNC: 9.1 MG/DL (ref 8.6–10.3)
CHLORIDE SERPL-SCNC: 97 MMOL/L (ref 98–107)
CO2 SERPL-SCNC: 25 MMOL/L (ref 21–32)
COLOR UR: YELLOW
CREAT SERPL-MCNC: 0.9 MG/DL (ref 0.5–1.3)
EGFRCR SERPLBLD CKD-EPI 2021: >90 ML/MIN/1.73M*2
EOSINOPHIL # BLD AUTO: 0.12 X10*3/UL (ref 0–0.7)
EOSINOPHIL NFR BLD AUTO: 0.6 %
ERYTHROCYTE [DISTWIDTH] IN BLOOD BY AUTOMATED COUNT: 15.4 % (ref 11.5–14.5)
GLUCOSE SERPL-MCNC: 115 MG/DL (ref 74–99)
GLUCOSE UR STRIP.AUTO-MCNC: NEGATIVE MG/DL
HCT VFR BLD AUTO: 30.4 % (ref 41–52)
HGB BLD-MCNC: 10.1 G/DL (ref 13.5–17.5)
HOLD SPECIMEN: NORMAL
HOLD SPECIMEN: NORMAL
IMM GRANULOCYTES # BLD AUTO: 0.38 X10*3/UL (ref 0–0.7)
IMM GRANULOCYTES NFR BLD AUTO: 1.9 % (ref 0–0.9)
KETONES UR STRIP.AUTO-MCNC: NEGATIVE MG/DL
LACTATE SERPL-SCNC: 0.8 MMOL/L (ref 0.4–2)
LEUKOCYTE ESTERASE UR QL STRIP.AUTO: NEGATIVE
LYMPHOCYTES # BLD AUTO: 1.72 X10*3/UL (ref 1.2–4.8)
LYMPHOCYTES NFR BLD AUTO: 8.7 %
MCH RBC QN AUTO: 28.3 PG (ref 26–34)
MCHC RBC AUTO-ENTMCNC: 33.2 G/DL (ref 32–36)
MCV RBC AUTO: 85 FL (ref 80–100)
MONOCYTES # BLD AUTO: 1.52 X10*3/UL (ref 0.1–1)
MONOCYTES NFR BLD AUTO: 7.7 %
MRSA DNA SPEC QL NAA+PROBE: NOT DETECTED
NEUTROPHILS # BLD AUTO: 15.86 X10*3/UL (ref 1.2–7.7)
NEUTROPHILS NFR BLD AUTO: 80.6 %
NITRITE UR QL STRIP.AUTO: NEGATIVE
NRBC BLD-RTO: 0 /100 WBCS (ref 0–0)
PH UR STRIP.AUTO: 6 [PH]
PLATELET # BLD AUTO: 486 X10*3/UL (ref 150–450)
POTASSIUM SERPL-SCNC: 3 MMOL/L (ref 3.5–5.3)
PROT SERPL-MCNC: 7.3 G/DL (ref 6.4–8.2)
PROT UR STRIP.AUTO-MCNC: NEGATIVE MG/DL
RBC # BLD AUTO: 3.57 X10*6/UL (ref 4.5–5.9)
RBC # UR STRIP.AUTO: NEGATIVE /UL
SODIUM SERPL-SCNC: 134 MMOL/L (ref 136–145)
SP GR UR STRIP.AUTO: >1.06
UROBILINOGEN UR STRIP.AUTO-MCNC: <2 MG/DL
VANCOMYCIN SERPL-MCNC: 10.7 UG/ML (ref 5–20)
VANCOMYCIN SERPL-MCNC: 19.6 UG/ML (ref 5–20)
WBC # BLD AUTO: 19.7 X10*3/UL (ref 4.4–11.3)

## 2024-03-02 PROCEDURE — 80053 COMPREHEN METABOLIC PANEL: CPT | Performed by: NURSE PRACTITIONER

## 2024-03-02 PROCEDURE — 83605 ASSAY OF LACTIC ACID: CPT | Performed by: NURSE PRACTITIONER

## 2024-03-02 PROCEDURE — 99223 1ST HOSP IP/OBS HIGH 75: CPT | Performed by: STUDENT IN AN ORGANIZED HEALTH CARE EDUCATION/TRAINING PROGRAM

## 2024-03-02 PROCEDURE — 87070 CULTURE OTHR SPECIMN AEROBIC: CPT | Mod: ELYLAB | Performed by: REGISTERED NURSE

## 2024-03-02 PROCEDURE — 96375 TX/PRO/DX INJ NEW DRUG ADDON: CPT

## 2024-03-02 PROCEDURE — 2500000004 HC RX 250 GENERAL PHARMACY W/ HCPCS (ALT 636 FOR OP/ED)

## 2024-03-02 PROCEDURE — 81003 URINALYSIS AUTO W/O SCOPE: CPT | Performed by: NURSE PRACTITIONER

## 2024-03-02 PROCEDURE — 96365 THER/PROPH/DIAG IV INF INIT: CPT

## 2024-03-02 PROCEDURE — 0W980ZZ DRAINAGE OF CHEST WALL, OPEN APPROACH: ICD-10-PCS | Performed by: REGISTERED NURSE

## 2024-03-02 PROCEDURE — 2500000004 HC RX 250 GENERAL PHARMACY W/ HCPCS (ALT 636 FOR OP/ED): Performed by: NURSE PRACTITIONER

## 2024-03-02 PROCEDURE — 87185 SC STD ENZYME DETCJ PER NZM: CPT | Mod: ELYLAB | Performed by: NURSE PRACTITIONER

## 2024-03-02 PROCEDURE — 10060 I&D ABSCESS SIMPLE/SINGLE: CPT | Mod: ZK | Performed by: REGISTERED NURSE

## 2024-03-02 PROCEDURE — 2500000002 HC RX 250 W HCPCS SELF ADMINISTERED DRUGS (ALT 637 FOR MEDICARE OP, ALT 636 FOR OP/ED): Performed by: NURSE PRACTITIONER

## 2024-03-02 PROCEDURE — 85025 COMPLETE CBC W/AUTO DIFF WBC: CPT | Performed by: NURSE PRACTITIONER

## 2024-03-02 PROCEDURE — 74177 CT ABD & PELVIS W/CONTRAST: CPT

## 2024-03-02 PROCEDURE — 88112 CYTOPATH CELL ENHANCE TECH: CPT | Performed by: PATHOLOGY

## 2024-03-02 PROCEDURE — 2550000001 HC RX 255 CONTRASTS: Performed by: NURSE PRACTITIONER

## 2024-03-02 PROCEDURE — 87185 SC STD ENZYME DETCJ PER NZM: CPT | Mod: ELYLAB | Performed by: REGISTERED NURSE

## 2024-03-02 PROCEDURE — 74177 CT ABD & PELVIS W/CONTRAST: CPT | Performed by: STUDENT IN AN ORGANIZED HEALTH CARE EDUCATION/TRAINING PROGRAM

## 2024-03-02 PROCEDURE — 99222 1ST HOSP IP/OBS MODERATE 55: CPT | Performed by: REGISTERED NURSE

## 2024-03-02 PROCEDURE — 96367 TX/PROPH/DG ADDL SEQ IV INF: CPT

## 2024-03-02 PROCEDURE — 88112 CYTOPATH CELL ENHANCE TECH: CPT | Mod: TC | Performed by: NURSE PRACTITIONER

## 2024-03-02 PROCEDURE — 36415 COLL VENOUS BLD VENIPUNCTURE: CPT | Performed by: NURSE PRACTITIONER

## 2024-03-02 PROCEDURE — 2500000004 HC RX 250 GENERAL PHARMACY W/ HCPCS (ALT 636 FOR OP/ED): Performed by: INTERNAL MEDICINE

## 2024-03-02 PROCEDURE — 87040 BLOOD CULTURE FOR BACTERIA: CPT | Mod: ELYLAB | Performed by: NURSE PRACTITIONER

## 2024-03-02 PROCEDURE — 80202 ASSAY OF VANCOMYCIN: CPT | Performed by: INTERNAL MEDICINE

## 2024-03-02 PROCEDURE — 71260 CT THORAX DX C+: CPT | Performed by: STUDENT IN AN ORGANIZED HEALTH CARE EDUCATION/TRAINING PROGRAM

## 2024-03-02 PROCEDURE — 2500000004 HC RX 250 GENERAL PHARMACY W/ HCPCS (ALT 636 FOR OP/ED): Performed by: STUDENT IN AN ORGANIZED HEALTH CARE EDUCATION/TRAINING PROGRAM

## 2024-03-02 PROCEDURE — 87641 MR-STAPH DNA AMP PROBE: CPT | Performed by: NURSE PRACTITIONER

## 2024-03-02 PROCEDURE — 2500000005 HC RX 250 GENERAL PHARMACY W/O HCPCS

## 2024-03-02 PROCEDURE — S4991 NICOTINE PATCH NONLEGEND: HCPCS | Performed by: NURSE PRACTITIONER

## 2024-03-02 PROCEDURE — 2500000001 HC RX 250 WO HCPCS SELF ADMINISTERED DRUGS (ALT 637 FOR MEDICARE OP): Performed by: STUDENT IN AN ORGANIZED HEALTH CARE EDUCATION/TRAINING PROGRAM

## 2024-03-02 PROCEDURE — 88305 TISSUE EXAM BY PATHOLOGIST: CPT | Performed by: PATHOLOGY

## 2024-03-02 PROCEDURE — 99285 EMERGENCY DEPT VISIT HI MDM: CPT | Mod: 25

## 2024-03-02 PROCEDURE — 1210000001 HC SEMI-PRIVATE ROOM DAILY

## 2024-03-02 RX ORDER — OXYCODONE HYDROCHLORIDE 5 MG/1
5 TABLET ORAL EVERY 6 HOURS PRN
Status: DISCONTINUED | OUTPATIENT
Start: 2024-03-02 | End: 2024-03-05 | Stop reason: HOSPADM

## 2024-03-02 RX ORDER — HYDROMORPHONE HYDROCHLORIDE 1 MG/ML
INJECTION, SOLUTION INTRAMUSCULAR; INTRAVENOUS; SUBCUTANEOUS
Status: COMPLETED
Start: 2024-03-02 | End: 2024-03-02

## 2024-03-02 RX ORDER — LIDOCAINE HYDROCHLORIDE 10 MG/ML
20 INJECTION INFILTRATION; PERINEURAL ONCE
Status: COMPLETED | OUTPATIENT
Start: 2024-03-02 | End: 2024-03-02

## 2024-03-02 RX ORDER — HYDROMORPHONE HYDROCHLORIDE 1 MG/ML
1 INJECTION, SOLUTION INTRAMUSCULAR; INTRAVENOUS; SUBCUTANEOUS ONCE
Status: COMPLETED | OUTPATIENT
Start: 2024-03-02 | End: 2024-03-02

## 2024-03-02 RX ORDER — LIDOCAINE HYDROCHLORIDE 10 MG/ML
INJECTION INFILTRATION; PERINEURAL
Status: COMPLETED
Start: 2024-03-02 | End: 2024-03-02

## 2024-03-02 RX ORDER — POLYETHYLENE GLYCOL 3350 17 G/17G
17 POWDER, FOR SOLUTION ORAL DAILY PRN
Status: DISCONTINUED | OUTPATIENT
Start: 2024-03-02 | End: 2024-03-05 | Stop reason: HOSPADM

## 2024-03-02 RX ORDER — ACETAMINOPHEN 160 MG/5ML
650 SOLUTION ORAL EVERY 4 HOURS PRN
Status: DISCONTINUED | OUTPATIENT
Start: 2024-03-02 | End: 2024-03-05 | Stop reason: HOSPADM

## 2024-03-02 RX ORDER — ACETAMINOPHEN 325 MG/1
650 TABLET ORAL EVERY 4 HOURS PRN
Status: DISCONTINUED | OUTPATIENT
Start: 2024-03-02 | End: 2024-03-05 | Stop reason: HOSPADM

## 2024-03-02 RX ORDER — METRONIDAZOLE 500 MG/100ML
500 INJECTION, SOLUTION INTRAVENOUS ONCE
Status: COMPLETED | OUTPATIENT
Start: 2024-03-02 | End: 2024-03-02

## 2024-03-02 RX ORDER — ONDANSETRON HYDROCHLORIDE 2 MG/ML
4 INJECTION, SOLUTION INTRAVENOUS ONCE
Status: COMPLETED | OUTPATIENT
Start: 2024-03-02 | End: 2024-03-02

## 2024-03-02 RX ORDER — VANCOMYCIN HYDROCHLORIDE 1 G/200ML
1000 INJECTION, SOLUTION INTRAVENOUS EVERY 12 HOURS
Status: DISCONTINUED | OUTPATIENT
Start: 2024-03-02 | End: 2024-03-03

## 2024-03-02 RX ORDER — VANCOMYCIN HYDROCHLORIDE 1 G/20ML
INJECTION, POWDER, LYOPHILIZED, FOR SOLUTION INTRAVENOUS DAILY PRN
Status: DISCONTINUED | OUTPATIENT
Start: 2024-03-02 | End: 2024-03-03

## 2024-03-02 RX ORDER — CLINDAMYCIN HYDROCHLORIDE 150 MG/1
300 CAPSULE ORAL 4 TIMES DAILY
Status: DISCONTINUED | OUTPATIENT
Start: 2024-03-02 | End: 2024-03-03

## 2024-03-02 RX ORDER — ACETAMINOPHEN 650 MG/1
650 SUPPOSITORY RECTAL EVERY 4 HOURS PRN
Status: DISCONTINUED | OUTPATIENT
Start: 2024-03-02 | End: 2024-03-05 | Stop reason: HOSPADM

## 2024-03-02 RX ORDER — LORAZEPAM 2 MG/ML
0.5 INJECTION INTRAMUSCULAR ONCE
Status: COMPLETED | OUTPATIENT
Start: 2024-03-02 | End: 2024-03-02

## 2024-03-02 RX ORDER — IBUPROFEN 200 MG
1 TABLET ORAL DAILY
Status: DISCONTINUED | OUTPATIENT
Start: 2024-03-02 | End: 2024-03-05 | Stop reason: HOSPADM

## 2024-03-02 RX ORDER — L. ACIDOPHILUS/L.BULGARICUS 1MM CELL
1 TABLET ORAL 3 TIMES DAILY
Status: DISCONTINUED | OUTPATIENT
Start: 2024-03-02 | End: 2024-03-05 | Stop reason: HOSPADM

## 2024-03-02 RX ADMIN — VANCOMYCIN HYDROCHLORIDE 2 G: 10 INJECTION, POWDER, LYOPHILIZED, FOR SOLUTION INTRAVENOUS at 09:31

## 2024-03-02 RX ADMIN — IOHEXOL 75 ML: 350 INJECTION, SOLUTION INTRAVENOUS at 07:03

## 2024-03-02 RX ADMIN — HYDROMORPHONE HYDROCHLORIDE 1 MG: 1 INJECTION, SOLUTION INTRAMUSCULAR; INTRAVENOUS; SUBCUTANEOUS at 07:36

## 2024-03-02 RX ADMIN — HYDROMORPHONE HYDROCHLORIDE 1 MG: 1 INJECTION, SOLUTION INTRAMUSCULAR; INTRAVENOUS; SUBCUTANEOUS at 15:11

## 2024-03-02 RX ADMIN — NICOTINE 1 PATCH: 21 PATCH, EXTENDED RELEASE TRANSDERMAL at 12:25

## 2024-03-02 RX ADMIN — OXYCODONE HYDROCHLORIDE 5 MG: 5 TABLET ORAL at 20:00

## 2024-03-02 RX ADMIN — OXYCODONE HYDROCHLORIDE 5 MG: 5 TABLET ORAL at 13:41

## 2024-03-02 RX ADMIN — METRONIDAZOLE 500 MG: 500 INJECTION, SOLUTION INTRAVENOUS at 08:13

## 2024-03-02 RX ADMIN — Medication 1 TABLET: at 15:10

## 2024-03-02 RX ADMIN — Medication 1 TABLET: at 20:00

## 2024-03-02 RX ADMIN — LORAZEPAM 0.5 MG: 2 INJECTION INTRAMUSCULAR; INTRAVENOUS at 12:26

## 2024-03-02 RX ADMIN — VANCOMYCIN HYDROCHLORIDE 1000 MG: 1 INJECTION, SOLUTION INTRAVENOUS at 22:51

## 2024-03-02 RX ADMIN — CLINDAMYCIN HYDROCHLORIDE 300 MG: 150 CAPSULE ORAL at 20:00

## 2024-03-02 RX ADMIN — CEFEPIME 2 G: 2 INJECTION, POWDER, FOR SOLUTION INTRAVENOUS at 07:36

## 2024-03-02 RX ADMIN — ONDANSETRON 4 MG: 2 INJECTION INTRAMUSCULAR; INTRAVENOUS at 07:35

## 2024-03-02 RX ADMIN — LIDOCAINE HYDROCHLORIDE 200 MG: 10 INJECTION, SOLUTION INFILTRATION; PERINEURAL at 15:47

## 2024-03-02 RX ADMIN — LIDOCAINE HYDROCHLORIDE 200 MG: 10 INJECTION INFILTRATION; PERINEURAL at 15:17

## 2024-03-02 RX ADMIN — HYDROMORPHONE HYDROCHLORIDE 1 MG: 1 INJECTION, SOLUTION INTRAMUSCULAR; INTRAVENOUS; SUBCUTANEOUS at 16:15

## 2024-03-02 RX ADMIN — CEFEPIME 1 G: 1 INJECTION, POWDER, FOR SOLUTION INTRAMUSCULAR; INTRAVENOUS at 15:10

## 2024-03-02 RX ADMIN — LIDOCAINE HYDROCHLORIDE 200 MG: 10 INJECTION INFILTRATION; PERINEURAL at 15:47

## 2024-03-02 RX ADMIN — LIDOCAINE HYDROCHLORIDE 200 MG: 10 INJECTION, SOLUTION INFILTRATION; PERINEURAL at 15:17

## 2024-03-02 RX ADMIN — CLINDAMYCIN HYDROCHLORIDE 300 MG: 150 CAPSULE ORAL at 15:10

## 2024-03-02 RX ADMIN — CEFEPIME 1 G: 1 INJECTION, POWDER, FOR SOLUTION INTRAMUSCULAR; INTRAVENOUS at 23:59

## 2024-03-02 SDOH — SOCIAL STABILITY: SOCIAL INSECURITY: DO YOU FEEL ANYONE HAS EXPLOITED OR TAKEN ADVANTAGE OF YOU FINANCIALLY OR OF YOUR PERSONAL PROPERTY?: NO

## 2024-03-02 SDOH — SOCIAL STABILITY: SOCIAL INSECURITY: DOES ANYONE TRY TO KEEP YOU FROM HAVING/CONTACTING OTHER FRIENDS OR DOING THINGS OUTSIDE YOUR HOME?: NO

## 2024-03-02 SDOH — SOCIAL STABILITY: SOCIAL INSECURITY: HAS ANYONE EVER THREATENED TO HURT YOUR FAMILY OR YOUR PETS?: NO

## 2024-03-02 SDOH — SOCIAL STABILITY: SOCIAL INSECURITY: ARE THERE ANY APPARENT SIGNS OF INJURIES/BEHAVIORS THAT COULD BE RELATED TO ABUSE/NEGLECT?: NO

## 2024-03-02 SDOH — SOCIAL STABILITY: SOCIAL INSECURITY: HAVE YOU HAD THOUGHTS OF HARMING ANYONE ELSE?: NO

## 2024-03-02 SDOH — SOCIAL STABILITY: SOCIAL INSECURITY: ABUSE: ADULT

## 2024-03-02 SDOH — SOCIAL STABILITY: SOCIAL INSECURITY: ARE YOU OR HAVE YOU BEEN THREATENED OR ABUSED PHYSICALLY, EMOTIONALLY, OR SEXUALLY BY ANYONE?: NO

## 2024-03-02 SDOH — SOCIAL STABILITY: SOCIAL INSECURITY: DO YOU FEEL UNSAFE GOING BACK TO THE PLACE WHERE YOU ARE LIVING?: NO

## 2024-03-02 SDOH — SOCIAL STABILITY: SOCIAL INSECURITY: WERE YOU ABLE TO COMPLETE ALL THE BEHAVIORAL HEALTH SCREENINGS?: YES

## 2024-03-02 ASSESSMENT — PAIN DESCRIPTION - ORIENTATION
ORIENTATION: RIGHT

## 2024-03-02 ASSESSMENT — PAIN - FUNCTIONAL ASSESSMENT
PAIN_FUNCTIONAL_ASSESSMENT: 0-10

## 2024-03-02 ASSESSMENT — ENCOUNTER SYMPTOMS
PSYCHIATRIC NEGATIVE: 1
GASTROINTESTINAL NEGATIVE: 1
WOUND: 1
MUSCULOSKELETAL NEGATIVE: 1
ENDOCRINE NEGATIVE: 1
FEVER: 1
NEUROLOGICAL NEGATIVE: 1
EYES NEGATIVE: 1
RESPIRATORY NEGATIVE: 1
CHILLS: 1

## 2024-03-02 ASSESSMENT — PAIN SCALES - GENERAL
PAINLEVEL_OUTOF10: 5 - MODERATE PAIN
PAINLEVEL_OUTOF10: 0 - NO PAIN
PAINLEVEL_OUTOF10: 5 - MODERATE PAIN
PAINLEVEL_OUTOF10: 5 - MODERATE PAIN
PAINLEVEL_OUTOF10: 3
PAINLEVEL_OUTOF10: 7

## 2024-03-02 ASSESSMENT — ACTIVITIES OF DAILY LIVING (ADL)
WALKS IN HOME: INDEPENDENT
FEEDING YOURSELF: INDEPENDENT
ADEQUATE_TO_COMPLETE_ADL: YES
PATIENT'S MEMORY ADEQUATE TO SAFELY COMPLETE DAILY ACTIVITIES?: YES
HEARING - LEFT EAR: FUNCTIONAL
TOILETING: INDEPENDENT
LACK_OF_TRANSPORTATION: NO
GROOMING: INDEPENDENT
DRESSING YOURSELF: INDEPENDENT
BATHING: INDEPENDENT
JUDGMENT_ADEQUATE_SAFELY_COMPLETE_DAILY_ACTIVITIES: YES
HEARING - RIGHT EAR: FUNCTIONAL

## 2024-03-02 ASSESSMENT — COGNITIVE AND FUNCTIONAL STATUS - GENERAL
PATIENT BASELINE BEDBOUND: NO
MOBILITY SCORE: 24
DAILY ACTIVITIY SCORE: 24

## 2024-03-02 ASSESSMENT — LIFESTYLE VARIABLES
EVER FELT BAD OR GUILTY ABOUT YOUR DRINKING: NO
AUDIT-C TOTAL SCORE: 1
HAVE YOU EVER FELT YOU SHOULD CUT DOWN ON YOUR DRINKING: NO
HOW OFTEN DO YOU HAVE 6 OR MORE DRINKS ON ONE OCCASION: NEVER
HAVE PEOPLE ANNOYED YOU BY CRITICIZING YOUR DRINKING: NO
EVER HAD A DRINK FIRST THING IN THE MORNING TO STEADY YOUR NERVES TO GET RID OF A HANGOVER: NO
HOW MANY STANDARD DRINKS CONTAINING ALCOHOL DO YOU HAVE ON A TYPICAL DAY: 1 OR 2
HOW OFTEN DO YOU HAVE A DRINK CONTAINING ALCOHOL: MONTHLY OR LESS
SKIP TO QUESTIONS 9-10: 1
AUDIT-C TOTAL SCORE: 1

## 2024-03-02 ASSESSMENT — PATIENT HEALTH QUESTIONNAIRE - PHQ9
1. LITTLE INTEREST OR PLEASURE IN DOING THINGS: NOT AT ALL
SUM OF ALL RESPONSES TO PHQ9 QUESTIONS 1 & 2: 0
2. FEELING DOWN, DEPRESSED OR HOPELESS: NOT AT ALL

## 2024-03-02 ASSESSMENT — PAIN DESCRIPTION - LOCATION
LOCATION: ABDOMEN
LOCATION: ABDOMEN
LOCATION: RIB CAGE

## 2024-03-02 NOTE — ED PROVIDER NOTES
"HPI   Chief Complaint   Patient presents with    Abscess     Pt seen yesterday in ED, diagnosed with abscess on right side, pt states has since \"burst open\"       43-year-old male returns emergency department, patient's been seen multiple times regarding a mass right anterior lower chest wall, thought to be cancerous in nature.  Due to insurance reasons he has been unable to follow-up, although states he does have an appointment Monday.  Patient states the mass is growing larger, becoming significantly more painful, hence his increased ER visits.  Patient states Lidoderm patch was applied, states the area started to blister, and then overnight opened and has been having a very malodorous drainage.      History provided by:  Patient   used: No                        Ashley Coma Scale Score: 15                     Patient History   Past Medical History:   Diagnosis Date    Liver cancer (CMS/HCC)     Lung cancer (CMS/HCC)      Past Surgical History:   Procedure Laterality Date    HAND SURGERY      left hand     No family history on file.  Social History     Tobacco Use    Smoking status: Every Day     Packs/day: .5     Types: Cigarettes    Smokeless tobacco: Never   Vaping Use    Vaping Use: Never used   Substance Use Topics    Alcohol use: Yes    Drug use: Yes     Types: Marijuana       Physical Exam   ED Triage Vitals [03/02/24 0552]   Temperature Heart Rate Respirations BP   36 °C (96.8 °F) (!) 123 18 109/71      Pulse Ox Temp Source Heart Rate Source Patient Position   99 % Skin Monitor Sitting      BP Location FiO2 (%)     Left arm --       Physical Exam  Physical Exam:  Constitutional: Vitals noted, no distress. Afebrile.   Cardiovascular: Regular, rate, rhythm, no murmur.   Pulmonary: Lungs clear bilaterally with good aeration. No adventitious breath sounds.   Gastrointestinal: Soft, nonsurgical. Nontender. No peritoneal signs. Normoactive bowel sounds.   Musculoskeletal: No peripheral " edema. Negative Homans bilaterally, no cords.   Skin: No rash.  Right anterior lower chest wall there is a very large erythematous lesion, softball size, there are multiple openings of the skin with purulent drainage noted  Neuro: No focal neurologic deficits, NIH score of 0.    ED Course & MDM   Diagnoses as of 03/02/24 1210   Chest wall mass   Wound infection   Sepsis, due to unspecified organism, unspecified whether acute organ dysfunction present (CMS/[facility])     Labs Reviewed   CBC WITH AUTO DIFFERENTIAL - Abnormal       Result Value    WBC 19.7 (*)     nRBC 0.0      RBC 3.57 (*)     Hemoglobin 10.1 (*)     Hematocrit 30.4 (*)     MCV 85      MCH 28.3      MCHC 33.2      RDW 15.4 (*)     Platelets 486 (*)     Neutrophils % 80.6      Immature Granulocytes %, Automated 1.9 (*)     Lymphocytes % 8.7      Monocytes % 7.7      Eosinophils % 0.6      Basophils % 0.5      Neutrophils Absolute 15.86 (*)     Immature Granulocytes Absolute, Automated 0.38      Lymphocytes Absolute 1.72      Monocytes Absolute 1.52 (*)     Eosinophils Absolute 0.12      Basophils Absolute 0.10     COMPREHENSIVE METABOLIC PANEL - Abnormal    Glucose 115 (*)     Sodium 134 (*)     Potassium 3.0 (*)     Chloride 97 (*)     Bicarbonate 25      Anion Gap 15      Urea Nitrogen 20      Creatinine 0.90      eGFR >90      Calcium 9.1      Albumin 3.4      Alkaline Phosphatase 132 (*)     Total Protein 7.3      AST 5 (*)     Bilirubin, Total 0.6      ALT 4 (*)    URINALYSIS WITH REFLEX CULTURE AND MICROSCOPIC - Abnormal    Color, Urine Yellow      Appearance, Urine Clear      Specific Gravity, Urine >1.060 (*)     pH, Urine 6.0      Protein, Urine NEGATIVE      Glucose, Urine NEGATIVE      Blood, Urine NEGATIVE      Ketones, Urine NEGATIVE      Bilirubin, Urine NEGATIVE      Urobilinogen, Urine <2.0      Nitrite, Urine NEGATIVE      Leukocyte Esterase, Urine NEGATIVE     BLOOD CULTURE - Normal    Blood Culture Loaded on Instrument - Culture in  progress     LACTATE - Normal    Lactate 0.8      Narrative:     Venipuncture immediately after or during the administration of Metamizole may lead to falsely low results. Testing should be performed immediately  prior to Metamizole dosing.   BLOOD CULTURE   TISSUE/WOUND CULTURE/SMEAR   MRSA SURVEILLANCE FOR VANCOMYCIN DE-ESCALATION, PCR   URINALYSIS WITH REFLEX CULTURE AND MICROSCOPIC    Narrative:     The following orders were created for panel order Urinalysis with Reflex Culture and Microscopic.  Procedure                               Abnormality         Status                     ---------                               -----------         ------                     Urinalysis with Reflex C...[[id number]]  Abnormal            Final result               Extra Urine Gray Tube[[id number]]                            In process                   Please view results for these tests on the individual orders.   EXTRA URINE GRAY TUBE   CYTOLOGY CONSULTATION (NON-GYNECOLOGIC)        CT chest abdomen pelvis w IV contrast   Final Result   CHEST:   1.  Large necrotic mass/loculated collection in the right lateral   lower chest/abdominal wall extending into the thoracic and abdominal   cavity demonstrating significant interval increase in size compared   to prior CT examination dated 02/18/2024. This measures approximately   10 x 10 x 6.5 cm compared to 8 x 7 x 3 cm on immediate prior CT   examination dated 02/18/2024. There is also interval increase in   intra-abdominal extension of this mass causing increased indentation   on the right lobe of liver compared to prior CT examination as   described above.   2. There is persistent evidence of subpleural thickening and   reticulation in the anterior aspect of the right middle lobe, similar   to slightly improved compared to prior CT examination.   3. Redemonstration of multiple spiculated pulmonary nodules in the   right upper lobe, right middle lobe and right lower lobe as  described   above, similar compared to immediate prior CT examination. Recommend   continued attention on follow-up imaging.        ABDOMEN-PELVIS:   1.  No acute abnormality in the abdomen and pelvis.             MACRO:   None        Signed by: Nohelia Gonzalez 3/2/2024 8:29 AM   Dictation workstation:   [id number]             Medical Decision Making  I was able to express a significant amount of bloody, purulent, very malodorous drainage from this area.    Given that the patient is tachycardic labs obtained as well as repeat imaging to reevaluate this area    Laboratory workup obtained, patient does have an elevated white count to 19.7, lactate levels unremarkable.    Repeat CT imaging ordered, shows large necrotic mass with loculated fluid collection right lateral lower chest/abdominal wall demonstrating a significant increase in size since CT from 2/18.  CT was performed after expressed a large amount of fluid from this area as well.    Given that the patient is tachycardic, has an elevated white count concern for sepsis, started on broad-spectrum antibiotics, IV fluids.    Patient states he has followed up scheduled for Monday, although I am unable to locate an appointment scheduled in the computer for Monday.  I am concerned the patient will be noncompliant with follow-up and his disease over the last 2 weeks that showed significant worsening.    Reach out to the transfer center to discuss transfer as well as our hospitalist here.    Ultimately hospitalist presented to the ED to evaluate the patient, reviewed the infection as well as the history of this patient, felt the patient would benefit from IV antibiotics and hospitalization for the infection as well as consult on Monday for possible biopsy to initiate care for the patient's likely new cancer.    Procedure  Procedures     NINA Denney-SHAZIA  03/02/24 4829

## 2024-03-02 NOTE — CARE PLAN
The patient's goals for the shift include      The clinical goals for the shift include pain at a tolerable level     Over the shift, the patient did not make progress toward the following goals. Barriers to progression include current chest mass. Recommendations to address these barriers include initiate poc.

## 2024-03-02 NOTE — Clinical Note
Needle placed in fluid to collect.  Red thick purulent fluid aspirated and will be sent to lab.  Patient tolerated well.

## 2024-03-02 NOTE — Clinical Note
Patient transferred back to wheelchair.  MRI is talking with nurse on floor to see if she can bring xanax and premedicate the patient prior to MRI study or if they want him back on floor.

## 2024-03-02 NOTE — CONSULTS
"Vancomycin Dosing by Pharmacy- INITIAL    Ritesh Luther is a 43 y.o. year old male who Pharmacy has been consulted for vancomycin dosing for pneumonia. Based on the patient's indication and renal status this patient will be dosed based on a goal AUC of 400-600.     Renal function is currently stable.    Visit Vitals  /78 (Patient Position: Sitting)   Pulse 92   Temp 36.4 °C (97.5 °F) (Temporal)   Resp 18        Lab Results   Component Value Date    CREATININE 0.90 03/02/2024    CREATININE 0.73 02/18/2024        Patient weight is No results found for: \"PTWEIGHT\"    No results found for: \"CULTURE\"     No intake/output data recorded.  [unfilled]    No results found for: \"PATIENTTEMP\"       Assessment/Plan     Patient has already been given a loading dose of 2000 mg in the ED at 0900 today.  Will initiate vancomycin maintenance,  1000 mg every 12 hours.    This dosing regimen is predicted by [employer] to result in the following pharmacokinetic parameters:  <Regimen: 1000 mg IV every 12 hours.  Start time: 22:00 on 03/02/2024  Exposure target: AUC24 (range)400-600 mg/L.hr   AUC24,ss: 488 mg/L.hr  Probability of AUC24 > 400: 70 %  Ctrough,ss: 14.8 mg/L  Probability of Ctrough,ss > 20: 27 %  Probability of nephrotoxicity (Lodise RODOLFO 2009): 10 %  Follow-up level will be ordered today  at 1500 and 2000 unless clinically indicated sooner.  Will continue to monitor renal function daily while on vancomycin and order serum creatinine at least every 48 hours if not already ordered.  Follow for continued vancomycin needs, clinical response, and signs/symptoms of toxicity.       JR Barboza. Ph.        "

## 2024-03-02 NOTE — CONSULTS
"Inpatient consult to Acute Care Surgery  Consult performed by: Rebekah Barker APRN-CNP  Consult ordered by: Chuckie Villegas MD  Reason for consult: Right chest wall necrotic wound      General Surgery Consult Note  Patient: Ritesh Luther  Unit/Bed: 1119/1119-A  YOB: 1980  MRN: [id number]  Acct: [id number]   Admitting Diagnosis: Wound infection [T14.8XXA, L08.9]  Chest wall mass [R22.2]  Sepsis, due to unspecified organism, unspecified whether acute organ dysfunction present (CMS/HCC) [A41.9]  Date:  3/2/2024  Hospital Day: 0  Attending: Chuckie Villegas MD    Complaint:  Chief Complaint   Patient presents with    Abscess     Pt seen yesterday in ED, diagnosed with abscess on right side, pt states has since \"burst open\"      History of Present Illness:  Ritesh Luther is a 43 y.o. male with no significant past medical history that presented from home with a right chest wall necrotic lesion. He states that this morning when he woke up this lesion began oozing with foul drainage which prompted him to come to the ED. Over the last 3 to 4 months he has lost at least 50 pounds.  Today he did wake up and had subjective fevers along with chills.     Back in December 2023 patient was treated for multifocal pneumonia and imaging was performed.  Over the past 2 months patient has had frequent visits to the emergency department with worsening pain in the chest wall.  He was seen on 2/18 and CT chest was completed and reviewed.  Patient was scheduled for outpatient follow-up with oncology on 3/4 for further workup.    Allergies:  Allergies   Allergen Reactions    Amoxicillin Other     shakiness      PMHx:  Past Medical History:   Diagnosis Date    Liver cancer (CMS/HCC)     Lung cancer (CMS/HCC)      PSHx:  Past Surgical History:   Procedure Laterality Date    HAND SURGERY      left hand     Social Hx:  Social History     Socioeconomic History    Marital status:      Spouse name: None    Number of " children: None    Years of education: None    Highest education level: None   Occupational History    None   Tobacco Use    Smoking status: Every Day     Packs/day: .5     Types: Cigarettes    Smokeless tobacco: Never   Vaping Use    Vaping Use: Never used   Substance and Sexual Activity    Alcohol use: Yes    Drug use: Yes     Types: Marijuana    Sexual activity: Defer   Other Topics Concern    None   Social History Narrative    None     Social Determinants of Health     Financial Resource Strain: Medium Risk (3/2/2024)    Overall Financial Resource Strain (CARDIA)     Difficulty of Paying Living Expenses: Somewhat hard   Food Insecurity: Not on file   Transportation Needs: No Transportation Needs (3/2/2024)    PRAPARE - Transportation     Lack of Transportation (Medical): No     Lack of Transportation (Non-Medical): No   Physical Activity: Not on file   Stress: Not on file   Social Connections: Not on file   Intimate Partner Violence: Not on file   Housing Stability: Low Risk  (3/2/2024)    Housing Stability Vital Sign     Unable to Pay for Housing in the Last Year: No     Number of Places Lived in the Last Year: 1     Unstable Housing in the Last Year: No     Family Hx:  No family history on file.  Review of Systems:   Review of Systems   Constitutional:  Positive for chills and fever.   HENT: Negative.     Eyes: Negative.    Respiratory: Negative.     Cardiovascular:  Positive for chest pain (right chest wall).   Gastrointestinal: Negative.    Endocrine: Negative.    Genitourinary: Negative.    Musculoskeletal: Negative.    Skin:  Positive for wound (abscess).   Neurological: Negative.    Psychiatric/Behavioral: Negative.       Physical Examination:    Visit Vitals  /78 (Patient Position: Sitting)   Pulse 92   Temp 36.4 °C (97.5 °F) (Temporal)   Resp 18      Physical Exam  Vitals reviewed.   Constitutional:       Appearance: Normal appearance. He is ill-appearing.      Comments: Chronically ill and frail  in appearance   HENT:      Head: Normocephalic.      Nose: Nose normal.      Mouth/Throat:      Mouth: Mucous membranes are moist.   Cardiovascular:      Rate and Rhythm: Normal rate.      Comments: Right chest wall with a large necrotic lesion with foul-smelling purulent drainage. Tender to palpation in the area of the lesion.  Pulmonary:      Effort: Pulmonary effort is normal.   Abdominal:      General: Abdomen is flat. Bowel sounds are normal.      Palpations: Abdomen is soft.   Skin:     General: Skin is warm.      Capillary Refill: Capillary refill takes less than 2 seconds.   Neurological:      General: No focal deficit present.      Mental Status: He is alert and oriented to person, place, and time.   Psychiatric:         Mood and Affect: Mood normal.       LABS:  CBC:   Lab Results   Component Value Date    WBC 19.7 (H) 03/02/2024    RBC 3.57 (L) 03/02/2024    HGB 10.1 (L) 03/02/2024    HCT 30.4 (L) 03/02/2024    MCV 85 03/02/2024    MCH 28.3 03/02/2024    MCHC 33.2 03/02/2024    RDW 15.4 (H) 03/02/2024     (H) 03/02/2024     CBC with Differential:    Lab Results   Component Value Date    WBC 19.7 (H) 03/02/2024    RBC 3.57 (L) 03/02/2024    HGB 10.1 (L) 03/02/2024    HCT 30.4 (L) 03/02/2024     (H) 03/02/2024    MCV 85 03/02/2024    MCH 28.3 03/02/2024    MCHC 33.2 03/02/2024    RDW 15.4 (H) 03/02/2024    NRBC 0.0 03/02/2024    LYMPHOPCT 8.7 03/02/2024    MONOPCT 7.7 03/02/2024    EOSPCT 0.6 03/02/2024    BASOPCT 0.5 03/02/2024    MONOSABS 1.52 (H) 03/02/2024    LYMPHSABS 1.72 03/02/2024    EOSABS 0.12 03/02/2024    BASOSABS 0.10 03/02/2024     CMP:    Lab Results   Component Value Date     (L) 03/02/2024    K 3.0 (L) 03/02/2024    CL 97 (L) 03/02/2024    CO2 25 03/02/2024    BUN 20 03/02/2024    CREATININE 0.90 03/02/2024    GLUCOSE 115 (H) 03/02/2024    PROT 7.3 03/02/2024    CALCIUM 9.1 03/02/2024    BILITOT 0.6 03/02/2024    ALKPHOS 132 (H) 03/02/2024    AST 5 (L) 03/02/2024    ALT  "4 (L) 03/02/2024     BMP:    Lab Results   Component Value Date     (L) 03/02/2024    K 3.0 (L) 03/02/2024    CL 97 (L) 03/02/2024    CO2 25 03/02/2024    BUN 20 03/02/2024    CREATININE 0.90 03/02/2024    CALCIUM 9.1 03/02/2024    GLUCOSE 115 (H) 03/02/2024     Magnesium:No results found for: \"MG\"  Troponin:  No results found for: \"TROPHS\"  Lipid Panel:  No results found for: \"HDL\", \"CHHDL\", \"VLDL\", \"TRIG\", \"NHDL\"   Current Medications:    Current Facility-Administered Medications:     acetaminophen (Tylenol) tablet 650 mg, 650 mg, oral, q4h PRN **OR** acetaminophen (Tylenol) oral liquid 650 mg, 650 mg, nasogastric tube, q4h PRN **OR** acetaminophen (Tylenol) suppository 650 mg, 650 mg, rectal, q4h PRN, Chuckie Villegas MD    cefepime (Maxipime) in dextrose 5% 50 mL IV 1 g, 1 g, intravenous, q8h, Chuckie Villegas MD    clindamycin (Cleocin) capsule 300 mg, 300 mg, oral, 4x daily, Chuckie Villegas MD    lactobacillus acidophilus tablet 1 tablet, 1 tablet, oral, TID, Chuckie Villegas MD    nicotine (Nicoderm CQ) 21 mg/24 hr patch 1 patch, 1 patch, transdermal, Daily, Chuckie Villegas MD, 1 patch at 03/02/24 1225    oxyCODONE (Roxicodone) immediate release tablet 5 mg, 5 mg, oral, q6h PRN, Chuckie Villegas MD, 5 mg at 03/02/24 1341    polyethylene glycol (Glycolax, Miralax) packet 17 g, 17 g, oral, Daily PRN, Chuckie Villegas MD    vancomycin (Vancocin) 1,000 mg in dextrose 5% water 200 mL, 1,000 mg, intravenous, q12h, Everette Massey, PharmD    vancomycin (Vancocin) placeholder, , miscellaneous, Daily PRN, Everette D Massey, PharmD  CT chest abdomen pelvis w IV contrast    Result Date: 3/2/2024  Interpreted By:  Nohelia Gonzalez, STUDY: CT CHEST ABDOMEN PELVIS W IV CONTRAST;  3/2/2024 7:02 am   INDICATION: Signs/Symptoms:mass/abscess.   COMPARISON: CT chest abdomen pelvis dated 02/18/2024   ACCESSION NUMBER(S): [id number]   ORDERING CLINICIAN: ALLISON KEMP   TECHNIQUE: CT of the chest, abdomen, and pelvis was performed after " administration of intravenous contrast. Contiguous axial images were obtained at 3 mm slice thickness through the chest, abdomen and pelvis. Coronal and sagittal reconstructions at 3 mm slice thickness were performed. 75 mL ml of contrast 250 mg iohexol were administered intravenously without immediate complication.   FINDINGS: CHEST:   LUNG/PLEURA/LARGE AIRWAYS: There is persistent evidence of pleural thickening with reticular subpleural airspace opacity with traction bronchiectasis in the anterior aspect of right middle lobe, slightly less pronounced on today's examination compared to immediate prior imaging with persistent evidence of moderate residual. There is also focal subpleural nodular airspace opacity in the lateral aspect of the right middle lobe, less pronounced on today's imaging compared to prior. There is persistent evidence of focal pleural thickening along the anterior aspect of the right upper lobe, similar compared to immediate prior CT examination. There is also a subpleural airspace opacity along the medial aspect of the right lower lobe measuring approximately 1 cm and is similar compared to immediate prior imaging. There are mild centrilobular emphysematous changes in bilateral lungs. Left lung is relatively clear.       VESSELS: Thoracic aorta is normal in course and caliber. Pulmonary artery is within normal limits for size.   HEART: Heart is normal in size. No pericardial effusion.   MEDIASTINUM AND YUVAL: There are mildly prominent mediastinal lymph nodes, similar compared to prior CT examination. Few calcified bilateral perihilar lymph nodes are noted. There are mildly prominent enlarged right axillary lymph nodes with a prominent lymph node measuring up to 1 cm. These are similar compared to prior CT examination. Thoracic esophagus appears grossly unremarkable.   CHEST WALL AND LOWER NECK: There is persistent evidence of a loculated fluid collection/necrotic mass extending along the right  lateral chest wall soft tissues. There has been significant interval increase in the size of right chest wall mass extending into the thoracic cavity and into the abdominal cavity with multiple loculations and associated indentation on the right lobe of liver. This mass measures approximately 10 x 10 x 6.5 cm compared 8 x 7 x 3 cm on prior CT examination. There is also mild interval increase in intra-abdominal extension of this mass causing increased indentation of the right lobe of liver compared to prior CT examination. However there is interval increase in subdiaphragmatic extension of this mass measuring approximately 3.5 x 3 x 2.5 cm.       ABDOMEN:   LIVER: There is persistent evidence of indentation along the peripheral aspect of the right lobe of liver secondary to intra-abdominal extension of the chest wall mass, slightly increased compared to prior CT examination. Otherwise liver is unremarkable without intrahepatic mass lesions.   BILE DUCTS: Intrahepatic or extrahepatic biliary ductal dilatation.   GALLBLADDER: Gallbladder is moderately distended and does not demonstrate calcified stones in the lumen.   PANCREAS: The pancreas appears unremarkable without evidence of ductal dilatation or masses.   SPLEEN: Spleen demonstrates multiple focal calcifications, suggestive of granulomatous disease.   ADRENAL GLANDS: Bilateral adrenal glands are unremarkable.   KIDNEYS AND URETERS: Bilateral kidneys enhance symmetrically. Hydronephrosis.   PELVIS:   BLADDER: Urinary bladder is not well distended and is limited in evaluation.   REPRODUCTIVE ORGANS: Prostate gland is unremarkable.   BOWEL: Stomach is not well distended limiting evaluation. Small bowel loops are normal in course and caliber and does not demonstrate segmental distention to suggest obstruction. Large bowel demonstrates moderate stool volume and appears grossly unremarkable. Appendix is visualized and appears unremarkable.     VESSELS: Mild  "atherosclerotic calcifications of the abdominal aorta are noted.   PERITONEUM/RETROPERITONEUM/LYMPH NODES: There is no free or loculated fluid collection, no free intraperitoneal air. The retroperitoneum appears normal.  No evidence of enlarged lymphadenopathy in the abdomen and pelvis.   BONE AND SOFT TISSUE: No suspicious osseous lesions. The right thoracic rib cage that is in close proximity to the aforementioned mass does not demonstrate osseous erosions or intramedullary involvement. Abdominal wall soft tissues otherwise appear grossly unremarkable except for the mass mentioned above.       CHEST: 1.  Large necrotic mass/loculated collection in the right lateral lower chest/abdominal wall extending into the thoracic and abdominal cavity demonstrating significant interval increase in size compared to prior CT examination dated 02/18/2024. This measures approximately 10 x 10 x 6.5 cm compared to 8 x 7 x 3 cm on immediate prior CT examination dated 02/18/2024. There is also interval increase in intra-abdominal extension of this mass causing increased indentation on the right lobe of liver compared to prior CT examination as described above. 2. There is persistent evidence of subpleural thickening and reticulation in the anterior aspect of the right middle lobe, similar to slightly improved compared to prior CT examination. 3. Redemonstration of multiple spiculated pulmonary nodules in the right upper lobe, right middle lobe and right lower lobe as described above, similar compared to immediate prior CT examination. Recommend continued attention on follow-up imaging.   ABDOMEN-PELVIS: 1.  No acute abnormality in the abdomen and pelvis.     MACRO: None   Signed by: Nohelia Gonzalez 3/2/2024 8:29 AM Dictation workstation:   [id number]       Assessment:    Consult for right chest wall necrotic wound    Patient is a 43 yr old male that presented to the ED due to right wound to chest oozing \"foul smelling drainage.\" " Patient states he has had this issue for the last 2-3 months but the mass grew to softball size and started draining overnight. Patient states he has been having chills off and on and most likely had a fever.     On exam abscess is draining purulent malodorous drainage. Skin around abscess is red, swollen and slightly hard. Center of wound skin is thin and necrotic. Morning labs show leukocytosis and hypokalemia. Afebrile. CT chest, abdomen and pelvis showed a large necrotic mass/loculated collection in the right lateral lower chest/abdominal wall extending into the thoracic and abdominal cavity demonstrating significant interval increase in size compared to prior CT on 02/18/2024. This measures approximately 10 x 10 x 6.5 cm compared to 8 x 7 x 3 cm.      Bedside I&D was discussed with patient and wife with bedside RN present. Procedure was explained thoroughly, and ultimately patient decided to proceed.      Plan:  -Continue diet as tolerated  -Replace K+   -Continue IV ABX broad spectrum until cultures come back.   -Pain control  -Nausea control  -DVT prophylaxis-per primary team  -Pulmonary toileting   -Encourage ambulation  -Continue care per primary team  -May need debridement of wound, Dr. Cr will assess in the am, unless patient shows signs of sepsis contact him right away.     Further recommendations per Dr. Cr     Time spent  60  minutes obtaining labs, imaging, recommendations, interview, assessment, examination, medication review/ordering, and EMR review.    Plan of care was discussed extensively with patient. Patient verbalized understanding through teach back method. All questions and concerns addressed upon examination.     Of note, this documentation is completed using the Dragon Dictation system (voice recognition software). There may be spelling and/or grammatical errors that were not corrected prior to final submission.    Electronically signed by SARAH Mann on 3/2/2024 at  2:26 PM

## 2024-03-02 NOTE — Clinical Note
Patient in CT procedure room for CT guided aspiration.  Patient connected to monitor and  images will be taken and reviewed by Dr. Schoenberger.

## 2024-03-02 NOTE — H&P
Medical Group History and Physical  ASSESSMENT & PLAN:     Right chest wall necrotic mass (SIRS 2/4 WBC count, heart rate)  Concerns for metastatic cancer  - Presenting from home with worsening right chest wall wound that is now draining purulent foul-smelling fluid  - CT chest/abdomen/pelvis with IV contrast reviewed showing a large necrotic mass and loculated collection in the right lateral lower chest and abdominal wall measuring 10 x 10 x 6.5 cm and increased in size over the last 2 weeks  - Subpleural thickening of the right lung as well  - Multiple spiculated pulmonary nodules in the right upper middle and lower lobe  - All of the above with concerns for metastatic malignacy  - I had a lengthy discussion with the emergency department, patient's primary oncologist whom he is supposed establish care with, general surgery, thoracic surgery OCTAVIO regarding plan  - General surgery consult  - Thoracic surgery consult  - Continue vancomycin, cefepime, clindamycin  - IR for possible lung versus liver lesion biopsy on Monday    Tobacco use disorder  Encounter for cessation of tobacco use  - 1 pack/day smoker since age 14  - Discussed importance of cutting down on tobacco especially if indeed cancer diagnosis    Leukocytosis, reactive to above    Hypokalemia     VTE prophylaxis: SCDs    Spoke with patient's wife at bedside, his children are also present.    ---Of note, this documentation is completed using the Dragon Dictation system (voice recognition software). There may be spelling and/or grammatical errors that were not corrected prior to final submission.---    Chuckie Villegas MD    HISTORY OF PRESENT ILLNESS:   Chief Complaint: Right chest wall necrotic draining wound    History Of Present Illness:    Ritesh Luther is a 43 y.o. male with no significant past medical history that presented from home with a wheezing right chest wall necrotic lesion.  He states that this morning when he woke up this lesion began  oozing with foul drainage which prompted him come to the ED.  Over the last 3 to 4 months he has lost at least 50 pounds.  Today he did wake up and had subjective fevers along with chills.    Back in December 2023 patient was treated for multifocal pneumonia and imaging was performed.  Over the past 2 months patient has had frequent visits to the emergency department with worsening pain in the chest wall.  He was seen on 2/18 and CT chest was completed and reviewed.  Patient was scheduled for outpatient follow-up with oncology on 3/4 for further workup.    Patient is uninsured and has had significant social issues preventing him from seeing any physicians.  He is currently working on his Medicaid application through [employer] team.    Spoke with patient's oncologist whom he was supposed to meet with this week who agreed with admission and workup.    ED course:   - Vitals: Temperature 96.8, , RR 18 saturating 98% room air, /71  - Labs: WBC count of 19.7 otherwise unremarkable CBC.  Potassium of 3 otherwise unremarkable CMP.  Urinalysis grossly unremarkable  - Imaging: CT chest/abdomen/pelvis with IV contrast completed and reviewed.  Refer to the results section final results.    Had a lengthy discussion with patient and his mother at bedside in the ED with the emergency department providers and were able to convince him for admission.     Review of systems: 10 point review of systems is otherwise negative except as mentioned above.    PAST HISTORIES:     Past Medical History: No significant medical problems    Past Surgical History: Left hand surgery    Social History: Current 1 pack/day tobacco smoker since age 14 or 15.  Denies current alcohol use.  Smokes marijuana throughout the day on a daily basis.  Lives at home with his wife and 4 children.  Currently unemployed.  Independent with ADLs.    Family History: Mother is relatively healthy, patient does not know her other medical problems.  Father   at the age of 56 from lung cancer.    Allergies: Amoxicillin    OBJECTIVE:     Last Recorded Vitals:  Vitals:    24 1047 24 1144 24 1255 24 1313   BP: 110/70 125/76 126/78 120/78   BP Location:  Left arm     Patient Position:  Sitting  Sitting   Pulse: 75 86 82 92   Resp: 18 18 18 18   Temp:    36.4 °C (97.5 °F)   TempSrc:    Temporal   SpO2: 100% 99% 99% 99%   Weight:       Height:         Last I/O:  No intake/output data recorded.    Physical Exam  Vitals reviewed.   Constitutional:       General: He is not in acute distress.     Appearance: Normal appearance. He is normal weight. He is ill-appearing.      Comments: Chronically ill and frail in appearance.   HENT:      Head: Normocephalic and atraumatic.      Nose: Nose normal.      Mouth/Throat:      Mouth: Mucous membranes are moist.      Pharynx: Oropharynx is clear.      Comments: Poor dentition.  Eyes:      Extraocular Movements: Extraocular movements intact.      Conjunctiva/sclera: Conjunctivae normal.      Pupils: Pupils are equal, round, and reactive to light.   Cardiovascular:      Rate and Rhythm: Normal rate and regular rhythm.      Pulses: Normal pulses.      Heart sounds: Normal heart sounds.      Comments: Right chest wall with a large necrotic lesion with foul-smelling purulent drainage.  Tender to palpation in the area of the lesion.  Pulmonary:      Effort: Pulmonary effort is normal.      Breath sounds: Normal breath sounds.   Abdominal:      General: Abdomen is flat. Bowel sounds are normal. There is no distension.      Palpations: Abdomen is soft.      Tenderness: There is no abdominal tenderness. There is no guarding.   Musculoskeletal:         General: No swelling or tenderness. Normal range of motion.      Cervical back: Normal range of motion and neck supple.   Neurological:      General: No focal deficit present.      Mental Status: He is alert and oriented to person, place, and time. Mental status is at  baseline.   Psychiatric:         Mood and Affect: Mood normal.         Behavior: Behavior normal.     Scheduled Medications  cefepime, 1 g, intravenous, q8h  clindamycin, 300 mg, oral, 4x daily  lactobacillus acidophilus, 1 tablet, oral, TID  nicotine, 1 patch, transdermal, Daily  vancomycin, 1,000 mg, intravenous, q12h    PRN Medications  PRN medications: acetaminophen **OR** acetaminophen **OR** acetaminophen, oxyCODONE, polyethylene glycol, vancomycin  Continuous Medications     Outpatient Medications:  Prior to Admission medications    Medication Sig   acetaminophen (Tylenol) 500 mg tablet Take 2 tablets (1,000 mg) by mouth 3 times a day for 10 days.   clindamycin (Cleocin) 150 mg capsule Take 2 capsules (300 mg) by mouth every 6 hours for 7 days.   ibuprofen 600 mg tablet 600 mg 1 to 4 times a day for 5 days   lidocaine HCL 4 % adhesive patch,medicated Apply 1 patch topically once daily for 10 days. Place on area of pain.   naproxen (Naprosyn) 250 mg tablet Take 1 tablet (250 mg) by mouth 2 times a day with meals for 10 days.   oxyCODONE (Roxicodone) 5 mg immediate release tablet Take 1 tablet (5 mg) by mouth every 6 hours if needed for severe pain (7 - 10) for up to 3 days.   oxyCODONE-acetaminophen (Percocet) 5-325 mg tablet Take 1 tablet by mouth every 6 hours if needed for severe pain (7 - 10) for up to 3 days.    oxyCODONE-acetaminophen (Percocet) 5-325 mg tablet Take 1 tablet by mouth every 6 hours if needed for severe pain (7 - 10) for up to 3 days.     LABS AND IMAGING:     Labs:  Results from last 7 days   Lab Units 03/02/24  0656   WBC AUTO x10*3/uL 19.7*   RBC AUTO x10*6/uL 3.57*   HEMOGLOBIN g/dL 10.1*   HEMATOCRIT % 30.4*   MCV fL 85   MCH pg 28.3   MCHC g/dL 33.2   RDW % 15.4*   PLATELETS AUTO x10*3/uL 486*     Results from last 7 days   Lab Units 03/02/24  0656   SODIUM mmol/L 134*   POTASSIUM mmol/L 3.0*   CHLORIDE mmol/L 97*   CO2 mmol/L 25   BUN mg/dL 20   CREATININE mg/dL 0.90   GLUCOSE mg/dL  115*   PROTEIN TOTAL g/dL 7.3   CALCIUM mg/dL 9.1   BILIRUBIN TOTAL mg/dL 0.6   ALK PHOS U/L 132*   AST U/L 5*   ALT U/L 4*     Imaging:  CT chest abdomen pelvis w IV contrast  Narrative: Interpreted By:  Nohelia Gonzalez,   STUDY:  CT CHEST ABDOMEN PELVIS W IV CONTRAST;  3/2/2024 7:02 am      INDICATION:  Signs/Symptoms:mass/abscess.      COMPARISON:  CT chest abdomen pelvis dated 02/18/2024      ACCESSION NUMBER(S):  [id number]      ORDERING CLINICIAN:  ALLISON KEMP      TECHNIQUE:  CT of the chest, abdomen, and pelvis was performed after  administration of intravenous contrast. Contiguous axial images were  obtained at 3 mm slice thickness through the chest, abdomen and  pelvis. Coronal and sagittal reconstructions at 3 mm slice thickness  were performed.  75 mL ml of contrast 250 mg iohexol were administered intravenously  without immediate complication.      FINDINGS:  CHEST:      LUNG/PLEURA/LARGE AIRWAYS:  There is persistent evidence of pleural thickening with reticular  subpleural airspace opacity with traction bronchiectasis in the  anterior aspect of right middle lobe, slightly less pronounced on  today's examination compared to immediate prior imaging with  persistent evidence of moderate residual. There is also focal  subpleural nodular airspace opacity in the lateral aspect of the  right middle lobe, less pronounced on today's imaging compared to  prior. There is persistent evidence of focal pleural thickening along  the anterior aspect of the right upper lobe, similar compared to  immediate prior CT examination. There is also a subpleural airspace  opacity along the medial aspect of the right lower lobe measuring  approximately 1 cm and is similar compared to immediate prior  imaging. There are mild centrilobular emphysematous changes in  bilateral lungs. Left lung is relatively clear.              VESSELS:  Thoracic aorta is normal in course and caliber. Pulmonary artery is  within normal limits  for size.      HEART:  Heart is normal in size. No pericardial effusion.      MEDIASTINUM AND YUVAL:  There are mildly prominent mediastinal lymph nodes, similar compared  to prior CT examination. Few calcified bilateral perihilar lymph  nodes are noted. There are mildly prominent enlarged right axillary  lymph nodes with a prominent lymph node measuring up to 1 cm. These  are similar compared to prior CT examination. Thoracic esophagus  appears grossly unremarkable.      CHEST WALL AND LOWER NECK:  There is persistent evidence of a loculated fluid collection/necrotic  mass extending along the right lateral chest wall soft tissues. There  has been significant interval increase in the size of right chest  wall mass extending into the thoracic cavity and into the abdominal  cavity with multiple loculations and associated indentation on the  right lobe of liver. This mass measures approximately 10 x 10 x 6.5  cm compared 8 x 7 x 3 cm on prior CT examination. There is also mild  interval increase in intra-abdominal extension of this mass causing  increased indentation of the right lobe of liver compared to prior CT  examination. However there is interval increase in subdiaphragmatic  extension of this mass measuring approximately 3.5 x 3 x 2.5 cm.              ABDOMEN:      LIVER:  There is persistent evidence of indentation along the peripheral  aspect of the right lobe of liver secondary to intra-abdominal  extension of the chest wall mass, slightly increased compared to  prior CT examination. Otherwise liver is unremarkable without  intrahepatic mass lesions.      BILE DUCTS:  Intrahepatic or extrahepatic biliary ductal dilatation.      GALLBLADDER:  Gallbladder is moderately distended and does not demonstrate  calcified stones in the lumen.      PANCREAS:  The pancreas appears unremarkable without evidence of ductal  dilatation or masses.      SPLEEN:  Spleen demonstrates multiple focal calcifications, suggestive  of  granulomatous disease.      ADRENAL GLANDS:  Bilateral adrenal glands are unremarkable.      KIDNEYS AND URETERS:  Bilateral kidneys enhance symmetrically. Hydronephrosis.      PELVIS:      BLADDER:  Urinary bladder is not well distended and is limited in evaluation.      REPRODUCTIVE ORGANS:  Prostate gland is unremarkable.      BOWEL:  Stomach is not well distended limiting evaluation. Small bowel loops  are normal in course and caliber and does not demonstrate segmental  distention to suggest obstruction. Large bowel demonstrates moderate  stool volume and appears grossly unremarkable. Appendix is visualized  and appears unremarkable.          VESSELS:  Mild atherosclerotic calcifications of the abdominal aorta are noted.      PERITONEUM/RETROPERITONEUM/LYMPH NODES:  There is no free or loculated fluid collection, no free  intraperitoneal air. The retroperitoneum appears normal.  No evidence  of enlarged lymphadenopathy in the abdomen and pelvis.      BONE AND SOFT TISSUE:  No suspicious osseous lesions. The right thoracic rib cage that is in  close proximity to the aforementioned mass does not demonstrate  osseous erosions or intramedullary involvement. Abdominal wall soft  tissues otherwise appear grossly unremarkable except for the mass  mentioned above.      Impression: CHEST:  1.  Large necrotic mass/loculated collection in the right lateral  lower chest/abdominal wall extending into the thoracic and abdominal  cavity demonstrating significant interval increase in size compared  to prior CT examination dated 02/18/2024. This measures approximately  10 x 10 x 6.5 cm compared to 8 x 7 x 3 cm on immediate prior CT  examination dated 02/18/2024. There is also interval increase in  intra-abdominal extension of this mass causing increased indentation  on the right lobe of liver compared to prior CT examination as  described above.  2. There is persistent evidence of subpleural thickening and  reticulation in  the anterior aspect of the right middle lobe, similar  to slightly improved compared to prior CT examination.  3. Redemonstration of multiple spiculated pulmonary nodules in the  right upper lobe, right middle lobe and right lower lobe as described  above, similar compared to immediate prior CT examination. Recommend  continued attention on follow-up imaging.      ABDOMEN-PELVIS:  1.  No acute abnormality in the abdomen and pelvis.          MACRO:  None      Signed by: Nohelia Gonzalez 3/2/2024 8:29 AM  Dictation workstation:   [id number]

## 2024-03-02 NOTE — PROCEDURES
Procedure Name: Incision and drainage    Indication: Abscess to anterior chest wall.    The area measured about 10-12 cm horizontally. It was cleansed with chlorhexadine prep. 40 mL of 1% Lidocaine was injected with a 25 gauge needle in a wheal like fashion. Before injecting the Lidocaine, there was no aspiration of blood. A number 11 blade scalpel was used to incise a 6 cm horizontal incision. Culture of wound obtained. Approximately 20 cc of purulent fluid was drained. There was erythema, warmth and purulent drainage. Area was irrigated and forceps were used to explore the area. The wound was packed with Kerlix and covered with ABD and tape. The patient tolerated the procedure well.

## 2024-03-03 LAB
ANION GAP SERPL CALC-SCNC: 11 MMOL/L (ref 10–20)
BASOPHILS # BLD AUTO: 0.1 X10*3/UL (ref 0–0.1)
BASOPHILS NFR BLD AUTO: 1 %
BUN SERPL-MCNC: 13 MG/DL (ref 6–23)
CALCIUM SERPL-MCNC: 9.2 MG/DL (ref 8.6–10.3)
CHLORIDE SERPL-SCNC: 96 MMOL/L (ref 98–107)
CO2 SERPL-SCNC: 29 MMOL/L (ref 21–32)
CREAT SERPL-MCNC: 0.69 MG/DL (ref 0.5–1.3)
EGFRCR SERPLBLD CKD-EPI 2021: >90 ML/MIN/1.73M*2
EOSINOPHIL # BLD AUTO: 0.25 X10*3/UL (ref 0–0.7)
EOSINOPHIL NFR BLD AUTO: 2.5 %
ERYTHROCYTE [DISTWIDTH] IN BLOOD BY AUTOMATED COUNT: 15.4 % (ref 11.5–14.5)
GLUCOSE SERPL-MCNC: 97 MG/DL (ref 74–99)
HCT VFR BLD AUTO: 28.8 % (ref 41–52)
HGB BLD-MCNC: 9.2 G/DL (ref 13.5–17.5)
HOLD SPECIMEN: NORMAL
IMM GRANULOCYTES # BLD AUTO: 0.24 X10*3/UL (ref 0–0.7)
IMM GRANULOCYTES NFR BLD AUTO: 2.4 % (ref 0–0.9)
LYMPHOCYTES # BLD AUTO: 2.16 X10*3/UL (ref 1.2–4.8)
LYMPHOCYTES NFR BLD AUTO: 21.4 %
MAGNESIUM SERPL-MCNC: 2.11 MG/DL (ref 1.6–2.4)
MCH RBC QN AUTO: 27.2 PG (ref 26–34)
MCHC RBC AUTO-ENTMCNC: 31.9 G/DL (ref 32–36)
MCV RBC AUTO: 85 FL (ref 80–100)
MONOCYTES # BLD AUTO: 0.76 X10*3/UL (ref 0.1–1)
MONOCYTES NFR BLD AUTO: 7.5 %
NEUTROPHILS # BLD AUTO: 6.57 X10*3/UL (ref 1.2–7.7)
NEUTROPHILS NFR BLD AUTO: 65.2 %
NRBC BLD-RTO: 0 /100 WBCS (ref 0–0)
PLATELET # BLD AUTO: 424 X10*3/UL (ref 150–450)
POTASSIUM SERPL-SCNC: 3.1 MMOL/L (ref 3.5–5.3)
RBC # BLD AUTO: 3.38 X10*6/UL (ref 4.5–5.9)
SODIUM SERPL-SCNC: 133 MMOL/L (ref 136–145)
WBC # BLD AUTO: 10.1 X10*3/UL (ref 4.4–11.3)

## 2024-03-03 PROCEDURE — 83735 ASSAY OF MAGNESIUM: CPT | Performed by: STUDENT IN AN ORGANIZED HEALTH CARE EDUCATION/TRAINING PROGRAM

## 2024-03-03 PROCEDURE — S4991 NICOTINE PATCH NONLEGEND: HCPCS | Performed by: STUDENT IN AN ORGANIZED HEALTH CARE EDUCATION/TRAINING PROGRAM

## 2024-03-03 PROCEDURE — 99232 SBSQ HOSP IP/OBS MODERATE 35: CPT | Performed by: STUDENT IN AN ORGANIZED HEALTH CARE EDUCATION/TRAINING PROGRAM

## 2024-03-03 PROCEDURE — 2500000002 HC RX 250 W HCPCS SELF ADMINISTERED DRUGS (ALT 637 FOR MEDICARE OP, ALT 636 FOR OP/ED): Performed by: STUDENT IN AN ORGANIZED HEALTH CARE EDUCATION/TRAINING PROGRAM

## 2024-03-03 PROCEDURE — 80048 BASIC METABOLIC PNL TOTAL CA: CPT | Performed by: STUDENT IN AN ORGANIZED HEALTH CARE EDUCATION/TRAINING PROGRAM

## 2024-03-03 PROCEDURE — 36415 COLL VENOUS BLD VENIPUNCTURE: CPT | Performed by: STUDENT IN AN ORGANIZED HEALTH CARE EDUCATION/TRAINING PROGRAM

## 2024-03-03 PROCEDURE — 2500000001 HC RX 250 WO HCPCS SELF ADMINISTERED DRUGS (ALT 637 FOR MEDICARE OP): Performed by: STUDENT IN AN ORGANIZED HEALTH CARE EDUCATION/TRAINING PROGRAM

## 2024-03-03 PROCEDURE — 2500000004 HC RX 250 GENERAL PHARMACY W/ HCPCS (ALT 636 FOR OP/ED): Performed by: INTERNAL MEDICINE

## 2024-03-03 PROCEDURE — 99233 SBSQ HOSP IP/OBS HIGH 50: CPT | Performed by: REGISTERED NURSE

## 2024-03-03 PROCEDURE — 2500000004 HC RX 250 GENERAL PHARMACY W/ HCPCS (ALT 636 FOR OP/ED): Performed by: REGISTERED NURSE

## 2024-03-03 PROCEDURE — 1210000001 HC SEMI-PRIVATE ROOM DAILY

## 2024-03-03 PROCEDURE — 85025 COMPLETE CBC W/AUTO DIFF WBC: CPT | Performed by: STUDENT IN AN ORGANIZED HEALTH CARE EDUCATION/TRAINING PROGRAM

## 2024-03-03 PROCEDURE — 2500000004 HC RX 250 GENERAL PHARMACY W/ HCPCS (ALT 636 FOR OP/ED): Performed by: STUDENT IN AN ORGANIZED HEALTH CARE EDUCATION/TRAINING PROGRAM

## 2024-03-03 RX ORDER — MORPHINE SULFATE 2 MG/ML
2 INJECTION, SOLUTION INTRAMUSCULAR; INTRAVENOUS ONCE
Status: COMPLETED | OUTPATIENT
Start: 2024-03-03 | End: 2024-03-03

## 2024-03-03 RX ORDER — POTASSIUM CHLORIDE 20 MEQ/1
40 TABLET, EXTENDED RELEASE ORAL EVERY 6 HOURS
Status: COMPLETED | OUTPATIENT
Start: 2024-03-03 | End: 2024-03-03

## 2024-03-03 RX ADMIN — NICOTINE 1 PATCH: 21 PATCH, EXTENDED RELEASE TRANSDERMAL at 09:00

## 2024-03-03 RX ADMIN — OXYCODONE HYDROCHLORIDE 5 MG: 5 TABLET ORAL at 06:48

## 2024-03-03 RX ADMIN — HYDROMORPHONE HYDROCHLORIDE 0.5 MG: 1 INJECTION, SOLUTION INTRAMUSCULAR; INTRAVENOUS; SUBCUTANEOUS at 17:08

## 2024-03-03 RX ADMIN — CEFEPIME 1 G: 1 INJECTION, POWDER, FOR SOLUTION INTRAMUSCULAR; INTRAVENOUS at 06:48

## 2024-03-03 RX ADMIN — VANCOMYCIN HYDROCHLORIDE 1000 MG: 1 INJECTION, SOLUTION INTRAVENOUS at 10:22

## 2024-03-03 RX ADMIN — CLINDAMYCIN HYDROCHLORIDE 300 MG: 150 CAPSULE ORAL at 16:54

## 2024-03-03 RX ADMIN — Medication 1 TABLET: at 08:59

## 2024-03-03 RX ADMIN — OXYCODONE HYDROCHLORIDE 5 MG: 5 TABLET ORAL at 13:17

## 2024-03-03 RX ADMIN — CLINDAMYCIN HYDROCHLORIDE 300 MG: 150 CAPSULE ORAL at 06:48

## 2024-03-03 RX ADMIN — POTASSIUM CHLORIDE 40 MEQ: 1500 TABLET, EXTENDED RELEASE ORAL at 14:02

## 2024-03-03 RX ADMIN — CEFEPIME 1 G: 1 INJECTION, POWDER, FOR SOLUTION INTRAMUSCULAR; INTRAVENOUS at 15:54

## 2024-03-03 RX ADMIN — CLINDAMYCIN HYDROCHLORIDE 300 MG: 150 CAPSULE ORAL at 13:17

## 2024-03-03 RX ADMIN — MORPHINE SULFATE 2 MG: 2 INJECTION, SOLUTION INTRAMUSCULAR; INTRAVENOUS at 10:22

## 2024-03-03 RX ADMIN — CEFEPIME 1 G: 1 INJECTION, POWDER, FOR SOLUTION INTRAMUSCULAR; INTRAVENOUS at 23:10

## 2024-03-03 RX ADMIN — Medication 1 TABLET: at 21:02

## 2024-03-03 RX ADMIN — Medication 1 TABLET: at 14:02

## 2024-03-03 RX ADMIN — OXYCODONE HYDROCHLORIDE 5 MG: 5 TABLET ORAL at 21:02

## 2024-03-03 RX ADMIN — POTASSIUM CHLORIDE 40 MEQ: 1500 TABLET, EXTENDED RELEASE ORAL at 08:58

## 2024-03-03 ASSESSMENT — COGNITIVE AND FUNCTIONAL STATUS - GENERAL
DAILY ACTIVITIY SCORE: 24
DAILY ACTIVITIY SCORE: 24
MOBILITY SCORE: 24
DAILY ACTIVITIY SCORE: 24
MOBILITY SCORE: 24
MOBILITY SCORE: 24

## 2024-03-03 ASSESSMENT — PAIN - FUNCTIONAL ASSESSMENT
PAIN_FUNCTIONAL_ASSESSMENT: 0-10

## 2024-03-03 ASSESSMENT — PAIN SCALES - GENERAL
PAINLEVEL_OUTOF10: 6
PAINLEVEL_OUTOF10: 1
PAINLEVEL_OUTOF10: 3
PAINLEVEL_OUTOF10: 7
PAINLEVEL_OUTOF10: 8
PAINLEVEL_OUTOF10: 3
PAINLEVEL_OUTOF10: 3
PAINLEVEL_OUTOF10: 5 - MODERATE PAIN
PAINLEVEL_OUTOF10: 6
PAINLEVEL_OUTOF10: 2

## 2024-03-03 ASSESSMENT — PAIN DESCRIPTION - ORIENTATION
ORIENTATION: RIGHT
ORIENTATION: RIGHT;OUTER

## 2024-03-03 ASSESSMENT — PAIN DESCRIPTION - LOCATION
LOCATION: RIB CAGE
LOCATION: RIB CAGE
LOCATION: ABDOMEN
LOCATION: ABDOMEN

## 2024-03-03 ASSESSMENT — PAIN SCALES - WONG BAKER: WONGBAKER_NUMERICALRESPONSE: HURTS LITTLE MORE

## 2024-03-03 NOTE — PROGRESS NOTES
Medical Group Progress Note  ASSESSMENT & PLAN:     Right chest wall necrotic mass (SIRS 2/4 WBC count, heart rate) s/p bedside I&D (3/2)  Liver lesions  Pulmonary nodules  Concerns for metastatic cancer  - CT chest/abdomen/pelvis with IV contrast reviewed showing a large necrotic mass and loculated collection in the right lateral lower chest and abdominal wall measuring 10 x 10 x 6.5 cm and increased in size over the last 2 weeks  - Subpleural thickening and multiple spiculated pulmonary nodules in the right upper middle and lower lobe  - All of the above with concerns for metastatic malignacy  - General surgery following  - Thoracic surgery consulted  - ID consulted  - Discussed with patient's outpatient oncologist on 3/2, will reach out for another discussion once biopsy is completed  - Continue vancomycin, cefepime, clindamycin  - IR for possible lung versus liver lesion biopsy on Monday - will discuss with Specials/IR team on 3/4     Tobacco use disorder  Encounter for cessation of tobacco use  - 1 pack/day smoker since age 14  - Discussed importance of cutting down on tobacco especially if indeed cancer diagnosis     Leukocytosis, reactive to above     Hypokalemia      VTE prophylaxis: SCDs     Disposition/Daily Update: Now status post I&D at bedside of the right lower chest wall lesion.  May require further washout in the OR, however will defer to general surgery.  Thoracic surgery consulted.  Will speak with IR/specials team on 3/4 to discuss timing for potential biopsy of a liver lesion.  Appreciate social work assistance with Medicaid application.      ---Of note, this documentation is completed using the Dragon Dictation system (voice recognition software). There may be spelling and/or grammatical errors that were not corrected prior to final submission.---    Chuckie Villegas MD    SUBJECTIVE     Patient was seen and examined bedside this morning.  Had no acute vents overnight.  Does have continued  drainage from the right chest wall mass that is now status post I&D.  Is aware of plan for general surgery, thoracic surgery and potential IR evaluations in the next 24 hours.    OBJECTIVE:     Last Recorded Vitals:  Vitals:    03/02/24 1255 03/02/24 1313 03/02/24 1930 03/03/24 0553   BP: 126/78 120/78 117/71 110/67   BP Location:       Patient Position:  Sitting     Pulse: 82 92 84 78   Resp: 18 18     Temp:  36.4 °C (97.5 °F) 36.6 °C (97.9 °F) 36.7 °C (98.1 °F)   TempSrc:  Temporal     SpO2: 99% 99% 98% 97%   Weight:       Height:         Last I/O:  I/O last 3 completed shifts:  In: 350 (4.8 mL/kg) [IV Piggyback:350]  Out: - (0 mL/kg)   Weight: 72.6 kg     Physical Exam  Vitals reviewed.   Constitutional:       General: He is not in acute distress.     Appearance: Normal appearance. He is normal weight. He is not ill-appearing.   HENT:      Head: Normocephalic and atraumatic.   Eyes:      Extraocular Movements: Extraocular movements intact.      Conjunctiva/sclera: Conjunctivae normal.   Cardiovascular:      Rate and Rhythm: Normal rate and regular rhythm.      Pulses: Normal pulses.      Heart sounds: Normal heart sounds.   Pulmonary:      Effort: Pulmonary effort is normal.      Breath sounds: Normal breath sounds.      Comments: Breath sounds diminished to auscultation bilaterally.  Right chest wall necrotic lesion previously examined with purulent drainage.  Today purulent drainage seen on dressing however wound itself was not examined.  Abdominal:      General: Abdomen is flat. Bowel sounds are normal.      Palpations: Abdomen is soft.      Tenderness: There is abdominal tenderness. There is no guarding.      Comments: Right-sided abdominal tenderness to palpation near the necrotic mass.   Musculoskeletal:         General: No swelling or tenderness. Normal range of motion.      Cervical back: Normal range of motion and neck supple.   Neurological:      General: No focal deficit present.      Mental Status: He  is alert and oriented to person, place, and time. Mental status is at baseline.   Psychiatric:         Mood and Affect: Mood normal.         Behavior: Behavior normal.     Inpatient Medications:  cefepime, 1 g, intravenous, q8h  clindamycin, 300 mg, oral, 4x daily  lactobacillus acidophilus, 1 tablet, oral, TID  nicotine, 1 patch, transdermal, Daily  potassium chloride CR, 40 mEq, oral, q6h  vancomycin, 1,000 mg, intravenous, q12h    PRN Medications  PRN medications: acetaminophen **OR** acetaminophen **OR** acetaminophen, oxyCODONE, polyethylene glycol, vancomycin  Continuous Medications:     LABS AND IMAGING:     Labs:  Results from last 7 days   Lab Units 03/03/24  0638 03/02/24  0656   WBC AUTO x10*3/uL 10.1 19.7*   RBC AUTO x10*6/uL 3.38* 3.57*   HEMOGLOBIN g/dL 9.2* 10.1*   HEMATOCRIT % 28.8* 30.4*   MCV fL 85 85   MCH pg 27.2 28.3   MCHC g/dL 31.9* 33.2   RDW % 15.4* 15.4*   PLATELETS AUTO x10*3/uL 424 486*     Results from last 7 days   Lab Units 03/03/24  0638 03/02/24  0656   SODIUM mmol/L 133* 134*   POTASSIUM mmol/L 3.1* 3.0*   CHLORIDE mmol/L 96* 97*   CO2 mmol/L 29 25   BUN mg/dL 13 20   CREATININE mg/dL 0.69 0.90   GLUCOSE mg/dL 97 115*   PROTEIN TOTAL g/dL  --  7.3   CALCIUM mg/dL 9.2 9.1   BILIRUBIN TOTAL mg/dL  --  0.6   ALK PHOS U/L  --  132*   AST U/L  --  5*   ALT U/L  --  4*     Imaging:  CT chest abdomen pelvis w IV contrast  Narrative: Interpreted By:  Nohelia Gonzalez,   STUDY:  CT CHEST ABDOMEN PELVIS W IV CONTRAST;  3/2/2024 7:02 am      INDICATION:  Signs/Symptoms:mass/abscess.      COMPARISON:  CT chest abdomen pelvis dated 02/18/2024      ACCESSION NUMBER(S):  [id number]      ORDERING CLINICIAN:  ALLISON KEMP      TECHNIQUE:  CT of the chest, abdomen, and pelvis was performed after  administration of intravenous contrast. Contiguous axial images were  obtained at 3 mm slice thickness through the chest, abdomen and  pelvis. Coronal and sagittal reconstructions at 3 mm slice  thickness  were performed.  75 mL ml of contrast 250 mg iohexol were administered intravenously  without immediate complication.      FINDINGS:  CHEST:      LUNG/PLEURA/LARGE AIRWAYS:  There is persistent evidence of pleural thickening with reticular  subpleural airspace opacity with traction bronchiectasis in the  anterior aspect of right middle lobe, slightly less pronounced on  today's examination compared to immediate prior imaging with  persistent evidence of moderate residual. There is also focal  subpleural nodular airspace opacity in the lateral aspect of the  right middle lobe, less pronounced on today's imaging compared to  prior. There is persistent evidence of focal pleural thickening along  the anterior aspect of the right upper lobe, similar compared to  immediate prior CT examination. There is also a subpleural airspace  opacity along the medial aspect of the right lower lobe measuring  approximately 1 cm and is similar compared to immediate prior  imaging. There are mild centrilobular emphysematous changes in  bilateral lungs. Left lung is relatively clear.              VESSELS:  Thoracic aorta is normal in course and caliber. Pulmonary artery is  within normal limits for size.      HEART:  Heart is normal in size. No pericardial effusion.      MEDIASTINUM AND YUVAL:  There are mildly prominent mediastinal lymph nodes, similar compared  to prior CT examination. Few calcified bilateral perihilar lymph  nodes are noted. There are mildly prominent enlarged right axillary  lymph nodes with a prominent lymph node measuring up to 1 cm. These  are similar compared to prior CT examination. Thoracic esophagus  appears grossly unremarkable.      CHEST WALL AND LOWER NECK:  There is persistent evidence of a loculated fluid collection/necrotic  mass extending along the right lateral chest wall soft tissues. There  has been significant interval increase in the size of right chest  wall mass extending into the  thoracic cavity and into the abdominal  cavity with multiple loculations and associated indentation on the  right lobe of liver. This mass measures approximately 10 x 10 x 6.5  cm compared 8 x 7 x 3 cm on prior CT examination. There is also mild  interval increase in intra-abdominal extension of this mass causing  increased indentation of the right lobe of liver compared to prior CT  examination. However there is interval increase in subdiaphragmatic  extension of this mass measuring approximately 3.5 x 3 x 2.5 cm.              ABDOMEN:      LIVER:  There is persistent evidence of indentation along the peripheral  aspect of the right lobe of liver secondary to intra-abdominal  extension of the chest wall mass, slightly increased compared to  prior CT examination. Otherwise liver is unremarkable without  intrahepatic mass lesions.      BILE DUCTS:  Intrahepatic or extrahepatic biliary ductal dilatation.      GALLBLADDER:  Gallbladder is moderately distended and does not demonstrate  calcified stones in the lumen.      PANCREAS:  The pancreas appears unremarkable without evidence of ductal  dilatation or masses.      SPLEEN:  Spleen demonstrates multiple focal calcifications, suggestive of  granulomatous disease.      ADRENAL GLANDS:  Bilateral adrenal glands are unremarkable.      KIDNEYS AND URETERS:  Bilateral kidneys enhance symmetrically. Hydronephrosis.      PELVIS:      BLADDER:  Urinary bladder is not well distended and is limited in evaluation.      REPRODUCTIVE ORGANS:  Prostate gland is unremarkable.      BOWEL:  Stomach is not well distended limiting evaluation. Small bowel loops  are normal in course and caliber and does not demonstrate segmental  distention to suggest obstruction. Large bowel demonstrates moderate  stool volume and appears grossly unremarkable. Appendix is visualized  and appears unremarkable.          VESSELS:  Mild atherosclerotic calcifications of the abdominal aorta are noted.       PERITONEUM/RETROPERITONEUM/LYMPH NODES:  There is no free or loculated fluid collection, no free  intraperitoneal air. The retroperitoneum appears normal.  No evidence  of enlarged lymphadenopathy in the abdomen and pelvis.      BONE AND SOFT TISSUE:  No suspicious osseous lesions. The right thoracic rib cage that is in  close proximity to the aforementioned mass does not demonstrate  osseous erosions or intramedullary involvement. Abdominal wall soft  tissues otherwise appear grossly unremarkable except for the mass  mentioned above.      Impression: CHEST:  1.  Large necrotic mass/loculated collection in the right lateral  lower chest/abdominal wall extending into the thoracic and abdominal  cavity demonstrating significant interval increase in size compared  to prior CT examination dated 02/18/2024. This measures approximately  10 x 10 x 6.5 cm compared to 8 x 7 x 3 cm on immediate prior CT  examination dated 02/18/2024. There is also interval increase in  intra-abdominal extension of this mass causing increased indentation  on the right lobe of liver compared to prior CT examination as  described above.  2. There is persistent evidence of subpleural thickening and  reticulation in the anterior aspect of the right middle lobe, similar  to slightly improved compared to prior CT examination.  3. Redemonstration of multiple spiculated pulmonary nodules in the  right upper lobe, right middle lobe and right lower lobe as described  above, similar compared to immediate prior CT examination. Recommend  continued attention on follow-up imaging.      ABDOMEN-PELVIS:  1.  No acute abnormality in the abdomen and pelvis.          MACRO:  None      Signed by: Nohelia Gonzalez 3/2/2024 8:29 AM  Dictation workstation:   [id number]

## 2024-03-03 NOTE — CARE PLAN
The patient's goals for the shift include Free from pain    The clinical goals for the shift include Labs WNL    Over the shift, the patient did not make progress toward the following goals. Barriers to progression include patient's perception, . Recommendations to address these barriers include non-pharmacologic strategies.    Problem: Skin  Goal: Decreased wound size/increased tissue granulation at next dressing change  3/3/2024 0049 by Geo Teixeira RN  Outcome: Progressing  Flowsheets (Taken 3/3/2024 0046)  Decreased wound size/increased tissue granulation at next dressing change: Promote sleep for wound healing  3/3/2024 0049 by Geo Teixeira RN  Outcome: Progressing  3/3/2024 0046 by Geo Teixeira RN  Outcome: Progressing  Flowsheets (Taken 3/3/2024 0046)  Decreased wound size/increased tissue granulation at next dressing change: Promote sleep for wound healing  3/3/2024 0043 by Geo Teixeira RN  Outcome: Progressing  Flowsheets (Taken 3/3/2024 0043)  Decreased wound size/increased tissue granulation at next dressing change: Promote sleep for wound healing  Goal: Participates in plan/prevention/treatment measures  3/3/2024 0049 by Geo Teixeira RN  Outcome: Progressing  Flowsheets (Taken 3/3/2024 0046)  Participates in plan/prevention/treatment measures: Increase activity/out of bed for meals  3/3/2024 0049 by Geo Teixeira RN  Outcome: Progressing  3/3/2024 0046 by Geo Teixeira RN  Outcome: Progressing  Flowsheets (Taken 3/3/2024 0046)  Participates in plan/prevention/treatment measures: Increase activity/out of bed for meals  3/3/2024 0043 by Geo Teixeira RN  Outcome: Progressing  Flowsheets (Taken 3/3/2024 0043)  Participates in plan/prevention/treatment measures: Increase activity/out of bed for meals  Goal: Prevent/manage excess moisture  3/3/2024 0049 by Geo Teixeira RN  Outcome: Progressing  Flowsheets (Taken 3/3/2024 0046)  Prevent/manage excess moisture: Monitor for/manage infection if  present  3/3/2024 0049 by Geo Teixeira RN  Outcome: Progressing  3/3/2024 0046 by Geo Teixeira RN  Outcome: Progressing  Flowsheets (Taken 3/3/2024 0046)  Prevent/manage excess moisture: Monitor for/manage infection if present  3/3/2024 0043 by Geo Teixeira RN  Outcome: Progressing  Goal: Prevent/minimize sheer/friction injuries  3/3/2024 0049 by Geo Teixeira RN  Outcome: Progressing  Flowsheets (Taken 3/3/2024 0046)  Prevent/minimize sheer/friction injuries: Increase activity/out of bed for meals  3/3/2024 0049 by Geo Teixeira RN  Outcome: Progressing  3/3/2024 0046 by Geo Teixeira RN  Outcome: Progressing  Flowsheets (Taken 3/3/2024 0046)  Prevent/minimize sheer/friction injuries: Increase activity/out of bed for meals  3/3/2024 0043 by Geo Teixeira RN  Outcome: Progressing  Flowsheets (Taken 3/3/2024 0043)  Prevent/minimize sheer/friction injuries: Increase activity/out of bed for meals  Goal: Promote/optimize nutrition  3/3/2024 0049 by Geo Teixeira RN  Outcome: Progressing  Flowsheets (Taken 3/3/2024 0046)  Promote/optimize nutrition:   Monitor/record intake including meals   Offer water/supplements/favorite foods  3/3/2024 0049 by Geo Teixeira RN  Outcome: Progressing  3/3/2024 0046 by Geo Teixeira RN  Outcome: Progressing  Flowsheets (Taken 3/3/2024 0046)  Promote/optimize nutrition:   Monitor/record intake including meals   Offer water/supplements/favorite foods  3/3/2024 0043 by Geo Teixeira RN  Outcome: Progressing  Flowsheets (Taken 3/3/2024 0043)  Promote/optimize nutrition: Offer water/supplements/favorite foods  Goal: Promote skin healing  3/3/2024 0049 by Geo Teixeira RN  Outcome: Progressing  Flowsheets (Taken 3/3/2024 0046)  Promote skin healing: Assess skin/pad under line(s)/device(s)  3/3/2024 0049 by Jelane Lluvia, RN  Outcome: Progressing  3/3/2024 0046 by Geo Teixeira RN  Outcome: Progressing  Flowsheets (Taken 3/3/2024 0046)  Promote skin healing: Assess skin/pad  under line(s)/device(s)  3/3/2024 0043 by Geo Teixeira RN  Outcome: Progressing  Flowsheets (Taken 3/3/2024 0043)  Promote skin healing: Assess skin/pad under line(s)/device(s)     Problem: Respiratory  Goal: Clear secretions with interventions this shift  3/3/2024 0049 by Geo Teixeira RN  Outcome: Progressing  Flowsheets (Taken 3/3/2024 0046)  Clear secretions with interventions this shift:   Encourage/provide pulmonary hygiene/secretion clearance   Med administration/monitoring of effect  3/3/2024 0049 by Geo Teixeira RN  Outcome: Progressing  3/3/2024 0046 by Geo Teixeira RN  Outcome: Progressing  Flowsheets (Taken 3/3/2024 0046)  Clear secretions with interventions this shift:   Encourage/provide pulmonary hygiene/secretion clearance   Med administration/monitoring of effect  3/3/2024 0043 by Geo Teixeira RN  Outcome: Progressing  Flowsheets (Taken 3/3/2024 0043)  Clear secretions with interventions this shift:   Med administration/monitoring of effect   Encourage/provide pulmonary hygiene/secretion clearance  Goal: Minimize anxiety/maximize coping throughout shift  3/3/2024 0049 by Geo Teixeira RN  Outcome: Progressing  Flowsheets (Taken 3/3/2024 0046)  Minimize anxiety/maximize coping throughout shift: Med administration/monitoring of effect  3/3/2024 0049 by Geo Teixeira RN  Outcome: Progressing  3/3/2024 0046 by Geo Teixeira RN  Outcome: Progressing  Flowsheets (Taken 3/3/2024 0046)  Minimize anxiety/maximize coping throughout shift: Med administration/monitoring of effect  3/3/2024 0043 by Geo Teixeira RN  Outcome: Progressing  Flowsheets (Taken 3/3/2024 0043)  Minimize anxiety/maximize coping throughout shift:   Med administration/monitoring of effect   Monitor pain/anxiety level  Goal: Minimal/no exertional discomfort or dyspnea this shift  3/3/2024 0049 by Geo Teixeira RN  Outcome: Progressing  Flowsheets (Taken 3/3/2024 0043)  Minimal/no exertional discomfort or dyspnea this shift:  Positioning to promote ventilation/comfort  3/3/2024 0049 by Geo Teixeira RN  Outcome: Progressing  3/3/2024 0046 by Geo Teixeira RN  Outcome: Progressing  Flowsheets (Taken 3/3/2024 0043)  Minimal/no exertional discomfort or dyspnea this shift: Positioning to promote ventilation/comfort  3/3/2024 0043 by Geo Teixeira RN  Outcome: Progressing  Flowsheets (Taken 3/3/2024 0043)  Minimal/no exertional discomfort or dyspnea this shift: Positioning to promote ventilation/comfort  Goal: Increase self care and/or family involvement in next 24 hours  3/3/2024 0049 by Geo Teixeira RN  Outcome: Progressing  Flowsheets (Taken 3/3/2024 0046)  Increase self care and/or family involvement in next 24 hours: Asthma home management plan  3/3/2024 0049 by Geo Teixeira RN  Outcome: Progressing  3/3/2024 0046 by Geo Teixeira RN  Outcome: Progressing  Flowsheets (Taken 3/3/2024 0046)  Increase self care and/or family involvement in next 24 hours: Asthma home management plan  3/3/2024 0043 by Geo Teixeira RN  Outcome: Progressing     Problem: Nutrition  Goal: Consume prescribed supplement  3/3/2024 0049 by Geo eTixeira RN  Outcome: Progressing  3/3/2024 0049 by Geo Teixeira RN  Outcome: Progressing  Goal: Adequate PO fluid intake  3/3/2024 0049 by Geo Teixeira RN  Outcome: Progressing  3/3/2024 0049 by Geo Teixeira RN  Outcome: Progressing  Goal: Lab values WNL  3/3/2024 0049 by Geo Teixeira RN  Outcome: Progressing  3/3/2024 0049 by Geo Teixeira RN  Outcome: Progressing  Goal: Electrolytes WNL  3/3/2024 0049 by Geo Teixeira RN  Outcome: Progressing  3/3/2024 0049 by Geo Teixeira RN  Outcome: Progressing  Goal: Promote healing  3/3/2024 0049 by Geo Teixeira RN  Outcome: Progressing  3/3/2024 0049 by Geo Teixeira RN  Outcome: Progressing  Goal: Maintain stable weight  3/3/2024 0049 by Geo Teixeira RN  Outcome: Progressing  3/3/2024 0049 by Geo Teixeira, AIME  Outcome: Progressing     Problem: Pain -  Adult  Goal: Verbalizes/displays adequate comfort level or baseline comfort level  3/3/2024 0049 by Geo Teixeira RN  Outcome: Progressing  Flowsheets (Taken 3/3/2024 0049)  Verbalizes/displays adequate comfort level or baseline comfort level:   Encourage patient to monitor pain and request assistance   Assess pain using appropriate pain scale   Administer analgesics based on type and severity of pain and evaluate response   Implement non-pharmacological measures as appropriate and evaluate response  3/3/2024 0049 by Geo Teixeira RN  Outcome: Progressing  Flowsheets (Taken 3/3/2024 0049)  Verbalizes/displays adequate comfort level or baseline comfort level:   Encourage patient to monitor pain and request assistance   Assess pain using appropriate pain scale   Administer analgesics based on type and severity of pain and evaluate response   Implement non-pharmacological measures as appropriate and evaluate response     Problem: Safety - Adult  Goal: Free from fall injury  3/3/2024 0049 by Geo Teixeira RN  Outcome: Progressing  Flowsheets (Taken 3/3/2024 0049)  Free from fall injury: Instruct family/caregiver on patient safety  3/3/2024 0049 by Geo Teixeira RN  Outcome: Progressing  Flowsheets (Taken 3/3/2024 0049)  Free from fall injury: Instruct family/caregiver on patient safety     Problem: Discharge Planning  Goal: Discharge to home or other facility with appropriate resources  3/3/2024 0049 by Geo Teixeira RN  Outcome: Progressing  Flowsheets (Taken 3/3/2024 0049)  Discharge to home or other facility with appropriate resources:   Identify barriers to discharge with patient and caregiver   Arrange for needed discharge resources and transportation as appropriate   Identify discharge learning needs (meds, wound care, etc)  3/3/2024 0049 by Geo Teixeira RN  Outcome: Progressing  Flowsheets (Taken 3/3/2024 0049)  Discharge to home or other facility with appropriate resources:   Identify barriers to  discharge with patient and caregiver   Arrange for needed discharge resources and transportation as appropriate   Identify discharge learning needs (meds, wound care, etc)     Problem: Chronic Conditions and Co-morbidities  Goal: Patient's chronic conditions and co-morbidity symptoms are monitored and maintained or improved  3/3/2024 0049 by Geo Teixeira RN  Outcome: Progressing  Flowsheets (Taken 3/3/2024 0049)  Care Plan - Patient's Chronic Conditions and Co-Morbidity Symptoms are Monitored and Maintained or Improved:   Monitor and assess patient's chronic conditions and comorbid symptoms for stability, deterioration, or improvement   Collaborate with multidisciplinary team to address chronic and comorbid conditions and prevent exacerbation or deterioration   Update acute care plan with appropriate goals if chronic or comorbid symptoms are exacerbated and prevent overall improvement and discharge  3/3/2024 0049 by Geo Teixeira RN  Outcome: Progressing  Flowsheets (Taken 3/3/2024 0049)  Care Plan - Patient's Chronic Conditions and Co-Morbidity Symptoms are Monitored and Maintained or Improved:   Monitor and assess patient's chronic conditions and comorbid symptoms for stability, deterioration, or improvement   Collaborate with multidisciplinary team to address chronic and comorbid conditions and prevent exacerbation or deterioration   Update acute care plan with appropriate goals if chronic or comorbid symptoms are exacerbated and prevent overall improvement and discharge     Problem: Fall/Injury  Goal: Not fall by end of shift  3/3/2024 0049 by Geo Teixeira RN  Outcome: Progressing  3/3/2024 0049 by Geo Teixeira RN  Outcome: Progressing  Goal: Be free from injury by end of the shift  3/3/2024 0049 by Geo Teixeira RN  Outcome: Progressing  3/3/2024 0049 by Geo Teixeira RN  Outcome: Progressing  Goal: Verbalize understanding of personal risk factors for fall in the hospital  3/3/2024 0049 by Geo  AIME Teixeira  Outcome: Progressing  3/3/2024 0049 by Geo Teixeira RN  Outcome: Progressing     Problem: Pain  Goal: Takes deep breaths with improved pain control throughout the shift  3/3/2024 0049 by Geo Teixeira RN  Outcome: Progressing  3/3/2024 0049 by Geo Teixeira RN  Outcome: Progressing  Goal: Turns in bed with improved pain control throughout the shift  3/3/2024 0049 by Geo Teixeira RN  Outcome: Progressing  3/3/2024 0049 by Geo Teixeira RN  Outcome: Progressing  Goal: Walks with improved pain control throughout the shift  3/3/2024 0049 by Geo Teixeira RN  Outcome: Progressing  3/3/2024 0049 by Geo Teixeira RN  Outcome: Progressing  Goal: Performs ADL's with improved pain control throughout shift  3/3/2024 0049 by Geo Teixeira RN  Outcome: Progressing  3/3/2024 0049 by Geo Teixeira RN  Outcome: Progressing

## 2024-03-03 NOTE — PROGRESS NOTES
Vancomycin Dosing by Pharmacy- Cessation of Therapy    Consult to pharmacy for vancomycin dosing has been discontinued by the prescriber, pharmacy will sign off at this time.    Please call pharmacy if there are further questions or re-enter a consult if vancomycin is resumed.     Roselyn Colin, [facility]

## 2024-03-03 NOTE — CONSULTS
Inpatient consult to Thoracic Surgery  Consult performed by: NINA Reyna-CNP  Consult ordered by: Chuckie Villegas MD          Reason For Consult  Chest wall necrotic lesion, concern for metastatic cancer    History Of Present Illness  Ritesh Luther is a 43 y.o. male presenting with no significant medical history presented to the department for complaints of oozing right chest wall necrotic lesion. He states that morning morning he woke up to this lesion began oozing with a foul drainage which prompted him to come to the ED. Patient states he has had a large lump in the right flank area and has been seen to ED approximately's 5 times about it. Patient has had the mass evaluated with a CT which showed likely lung and liver cancer per patient. He did see an oncologist who referred him for a biopsy but he has been unable to complete this due to financial constraints and uninsured.   ED course:   - Vitals: Temperature 96.8, , RR 18 saturating 98% room air, /71  - Labs: WBC count of 19.7 otherwise unremarkable CBC.  Potassium of 3 otherwise unremarkable CMP.  Urinalysis grossly unremarkable  CT imaging ordered, showing large necrotic mass with loculated fluid collection right lateral lower chest/abdominal wall demonstrating a significant increase in size since CT from 2/18.   Patient was admitted to hospitalist for IV antibiotics as well as possible biopsy to initiate care for the patient's likely new cancer. Per hospitalist will consult IR for possible lung versus liver lesion biopsy on Monday.  On 3/2 patient was seen by general surgery who recommended incision and drainage of abscess.  I&D was performed 3/3 and wound culture was obtained. ~ 20 cc of purulent fluid was drained.  There was erythema, warmth and purulent drainage as well.  The wound was packed with Kerlix and covered with ABD and tape. Plan for general surgery to take patient to the OR tomorrow for debridement of  wound.  Thoracic surgery was also consulted for further evaluation and recommendations of chest wall necrotic lesion and abnormal CT.   At time of consultation, patient is sitting in bed in no acute distress.  He remains afebrile and hemodynamically stable. He is maintaining adequate oxygen saturation on room air.  Wife and children at bedside during exam. Patient states pain is currently controlled.  Dr. Fritz updated, and imaging reviewed. Patient would benefit from PET-CT and brain MRI from thoracic perspective. Dr. Fritz will see patient tomorrow with further recommendations to follow. At this point, concern is infectious versus cancer. Thank you for the consult  Past Medical History  He has a past medical history of Liver cancer (CMS/HCC) and Lung cancer (CMS/HCC).    Surgical History  He has a past surgical history that includes Hand surgery and Incision and Drainage (3/2/2024).     Social History  He reports that he has been smoking cigarettes. He has been smoking an average of .5 packs per day. He has never used smokeless tobacco. He reports current alcohol use. He reports current drug use. Drug: Marijuana.    Family History  No family history on file.     Allergies  Amoxicillin    Review of Systems   All other systems reviewed and are negative.       Physical Exam  Constitutional:       General: He is not in acute distress.     Appearance: He is underweight. He is not ill-appearing or toxic-appearing.   HENT:      Head: Normocephalic and atraumatic.   Eyes:      Pupils: Pupils are equal, round, and reactive to light. Pupils are equal.   Cardiovascular:      Rate and Rhythm: Normal rate and regular rhythm.   Pulmonary:      Effort: Pulmonary effort is normal. No respiratory distress.   Chest:      Chest wall: No tenderness.   Abdominal:      Comments: Right lower flank with ABD dressing intact.   Skin:     General: Skin is warm.   Neurological:      Mental Status: He is alert and oriented to person, place,  and time.   Psychiatric:         Attention and Perception: Attention and perception normal.         Mood and Affect: Mood normal.         Speech: Speech normal.         Behavior: Behavior is cooperative.          Last Recorded Vitals  Blood pressure 110/67, pulse 78, temperature 36.7 °C (98.1 °F), resp. rate 18, height 1.829 m (6'), weight 72.6 kg (160 lb), SpO2 97 %.    Relevant Results    Current Facility-Administered Medications:     acetaminophen (Tylenol) tablet 650 mg, 650 mg, oral, q4h PRN **OR** acetaminophen (Tylenol) oral liquid 650 mg, 650 mg, nasogastric tube, q4h PRN **OR** acetaminophen (Tylenol) suppository 650 mg, 650 mg, rectal, q4h PRN, Chuckie Villegas MD    cefepime (Maxipime) in dextrose 5% 50 mL IV 1 g, 1 g, intravenous, q8h, Chuckie Villegas MD, 1 g at 03/03/24 0648    clindamycin (Cleocin) capsule 300 mg, 300 mg, oral, 4x daily, Chuckie Villegas MD, 300 mg at 03/03/24 0648    lactobacillus acidophilus tablet 1 tablet, 1 tablet, oral, TID, Chuckie Villegas MD, 1 tablet at 03/03/24 0859    nicotine (Nicoderm CQ) 21 mg/24 hr patch 1 patch, 1 patch, transdermal, Daily, Chuckie Villegas MD, 1 patch at 03/03/24 0900    oxyCODONE (Roxicodone) immediate release tablet 5 mg, 5 mg, oral, q6h PRN, Chuckie Villegas MD, 5 mg at 03/03/24 0648    polyethylene glycol (Glycolax, Miralax) packet 17 g, 17 g, oral, Daily PRN, Chuckie Villegas MD    potassium chloride CR (Klor-Con M20) ER tablet 40 mEq, 40 mEq, oral, q6h, Chuckie Villegas MD, 40 mEq at 03/03/24 0858    vancomycin (Vancocin) 1,000 mg in dextrose 5% water 200 mL, 1,000 mg, intravenous, q12h, Everette Massey PharmD, Stopped at 03/03/24 1122    vancomycin (Vancocin) placeholder, , miscellaneous, Daily PRN, Everette Massey, PharmD   Results for orders placed or performed during the hospital encounter of 03/02/24 (from the past 24 hour(s))   Vancomycin   Result Value Ref Range    Vancomycin 19.6 5.0 - 20.0 ug/mL   SST TOP   Result Value Ref Range    Extra Tube Hold for  add-ons.    Tissue/Wound Culture/Smear    Specimen: Wound/Tissue; Tissue/Biopsy   Result Value Ref Range    Tissue/Wound Culture/Smear No growth to date     Gram Stain (3+) Moderate Polymorphonuclear leukocytes     Gram Stain No organisms seen    Vancomycin   Result Value Ref Range    Vancomycin 10.7 5.0 - 20.0 ug/mL   CBC and Auto Differential   Result Value Ref Range    WBC 10.1 4.4 - 11.3 x10*3/uL    nRBC 0.0 0.0 - 0.0 /100 WBCs    RBC 3.38 (L) 4.50 - 5.90 x10*6/uL    Hemoglobin 9.2 (L) 13.5 - 17.5 g/dL    Hematocrit 28.8 (L) 41.0 - 52.0 %    MCV 85 80 - 100 fL    MCH 27.2 26.0 - 34.0 pg    MCHC 31.9 (L) 32.0 - 36.0 g/dL    RDW 15.4 (H) 11.5 - 14.5 %    Platelets 424 150 - 450 x10*3/uL    Neutrophils % 65.2 40.0 - 80.0 %    Immature Granulocytes %, Automated 2.4 (H) 0.0 - 0.9 %    Lymphocytes % 21.4 13.0 - 44.0 %    Monocytes % 7.5 2.0 - 10.0 %    Eosinophils % 2.5 0.0 - 6.0 %    Basophils % 1.0 0.0 - 2.0 %    Neutrophils Absolute 6.57 1.20 - 7.70 x10*3/uL    Immature Granulocytes Absolute, Automated 0.24 0.00 - 0.70 x10*3/uL    Lymphocytes Absolute 2.16 1.20 - 4.80 x10*3/uL    Monocytes Absolute 0.76 0.10 - 1.00 x10*3/uL    Eosinophils Absolute 0.25 0.00 - 0.70 x10*3/uL    Basophils Absolute 0.10 0.00 - 0.10 x10*3/uL   Basic Metabolic Panel   Result Value Ref Range    Glucose 97 74 - 99 mg/dL    Sodium 133 (L) 136 - 145 mmol/L    Potassium 3.1 (L) 3.5 - 5.3 mmol/L    Chloride 96 (L) 98 - 107 mmol/L    Bicarbonate 29 21 - 32 mmol/L    Anion Gap 11 10 - 20 mmol/L    Urea Nitrogen 13 6 - 23 mg/dL    Creatinine 0.69 0.50 - 1.30 mg/dL    eGFR >90 >60 mL/min/1.73m*2    Calcium 9.2 8.6 - 10.3 mg/dL   SST TOP   Result Value Ref Range    Extra Tube Hold for add-ons.     CT chest abdomen pelvis w IV contrast    Result Date: 3/2/2024  Interpreted By:  Nohelia Gonzalez, STUDY: CT CHEST ABDOMEN PELVIS W IV CONTRAST;  3/2/2024 7:02 am   INDICATION: Signs/Symptoms:mass/abscess.   COMPARISON: CT chest abdomen pelvis dated  02/18/2024   ACCESSION NUMBER(S): [id number]   ORDERING CLINICIAN: ALLISON KEMP   TECHNIQUE: CT of the chest, abdomen, and pelvis was performed after administration of intravenous contrast. Contiguous axial images were obtained at 3 mm slice thickness through the chest, abdomen and pelvis. Coronal and sagittal reconstructions at 3 mm slice thickness were performed. 75 mL ml of contrast 250 mg iohexol were administered intravenously without immediate complication.   FINDINGS: CHEST:   LUNG/PLEURA/LARGE AIRWAYS: There is persistent evidence of pleural thickening with reticular subpleural airspace opacity with traction bronchiectasis in the anterior aspect of right middle lobe, slightly less pronounced on today's examination compared to immediate prior imaging with persistent evidence of moderate residual. There is also focal subpleural nodular airspace opacity in the lateral aspect of the right middle lobe, less pronounced on today's imaging compared to prior. There is persistent evidence of focal pleural thickening along the anterior aspect of the right upper lobe, similar compared to immediate prior CT examination. There is also a subpleural airspace opacity along the medial aspect of the right lower lobe measuring approximately 1 cm and is similar compared to immediate prior imaging. There are mild centrilobular emphysematous changes in bilateral lungs. Left lung is relatively clear.       VESSELS: Thoracic aorta is normal in course and caliber. Pulmonary artery is within normal limits for size.   HEART: Heart is normal in size. No pericardial effusion.   MEDIASTINUM AND YUVAL: There are mildly prominent mediastinal lymph nodes, similar compared to prior CT examination. Few calcified bilateral perihilar lymph nodes are noted. There are mildly prominent enlarged right axillary lymph nodes with a prominent lymph node measuring up to 1 cm. These are similar compared to prior CT examination. Thoracic esophagus appears  grossly unremarkable.   CHEST WALL AND LOWER NECK: There is persistent evidence of a loculated fluid collection/necrotic mass extending along the right lateral chest wall soft tissues. There has been significant interval increase in the size of right chest wall mass extending into the thoracic cavity and into the abdominal cavity with multiple loculations and associated indentation on the right lobe of liver. This mass measures approximately 10 x 10 x 6.5 cm compared 8 x 7 x 3 cm on prior CT examination. There is also mild interval increase in intra-abdominal extension of this mass causing increased indentation of the right lobe of liver compared to prior CT examination. However there is interval increase in subdiaphragmatic extension of this mass measuring approximately 3.5 x 3 x 2.5 cm.       ABDOMEN:   LIVER: There is persistent evidence of indentation along the peripheral aspect of the right lobe of liver secondary to intra-abdominal extension of the chest wall mass, slightly increased compared to prior CT examination. Otherwise liver is unremarkable without intrahepatic mass lesions.   BILE DUCTS: Intrahepatic or extrahepatic biliary ductal dilatation.   GALLBLADDER: Gallbladder is moderately distended and does not demonstrate calcified stones in the lumen.   PANCREAS: The pancreas appears unremarkable without evidence of ductal dilatation or masses.   SPLEEN: Spleen demonstrates multiple focal calcifications, suggestive of granulomatous disease.   ADRENAL GLANDS: Bilateral adrenal glands are unremarkable.   KIDNEYS AND URETERS: Bilateral kidneys enhance symmetrically. Hydronephrosis.   PELVIS:   BLADDER: Urinary bladder is not well distended and is limited in evaluation.   REPRODUCTIVE ORGANS: Prostate gland is unremarkable.   BOWEL: Stomach is not well distended limiting evaluation. Small bowel loops are normal in course and caliber and does not demonstrate segmental distention to suggest obstruction. Large  bowel demonstrates moderate stool volume and appears grossly unremarkable. Appendix is visualized and appears unremarkable.     VESSELS: Mild atherosclerotic calcifications of the abdominal aorta are noted.   PERITONEUM/RETROPERITONEUM/LYMPH NODES: There is no free or loculated fluid collection, no free intraperitoneal air. The retroperitoneum appears normal.  No evidence of enlarged lymphadenopathy in the abdomen and pelvis.   BONE AND SOFT TISSUE: No suspicious osseous lesions. The right thoracic rib cage that is in close proximity to the aforementioned mass does not demonstrate osseous erosions or intramedullary involvement. Abdominal wall soft tissues otherwise appear grossly unremarkable except for the mass mentioned above.       CHEST: 1.  Large necrotic mass/loculated collection in the right lateral lower chest/abdominal wall extending into the thoracic and abdominal cavity demonstrating significant interval increase in size compared to prior CT examination dated 02/18/2024. This measures approximately 10 x 10 x 6.5 cm compared to 8 x 7 x 3 cm on immediate prior CT examination dated 02/18/2024. There is also interval increase in intra-abdominal extension of this mass causing increased indentation on the right lobe of liver compared to prior CT examination as described above. 2. There is persistent evidence of subpleural thickening and reticulation in the anterior aspect of the right middle lobe, similar to slightly improved compared to prior CT examination. 3. Redemonstration of multiple spiculated pulmonary nodules in the right upper lobe, right middle lobe and right lower lobe as described above, similar compared to immediate prior CT examination. Recommend continued attention on follow-up imaging.   ABDOMEN-PELVIS: 1.  No acute abnormality in the abdomen and pelvis.     MACRO: None   Signed by: Nohelia Gonzalez 3/2/2024 8:29 AM Dictation workstation:   [id number]       Assessment/Plan     Right chest wall  necrotic mass   s/p bedside I&D (3/2)  Liver lesions  Pulmonary nodules   -Dr. Fritz updated, will see patient tomorrow with further recommendations to follow   - from thoracic perspective patient needs PET-CT and MRI brain   - CT chest/abdomen/pelvis with IV contrast reviewed showing a large necrotic mass and loculated collection in the right lateral lower chest and abdominal wall measuring 10 x 10 x 6.5 cm and increased in size over the last 2 weeks  - Subpleural thickening and multiple spiculated pulmonary nodules in the right upper middle and lower lobe  - General surgery following- plan for debridement in OR 3/4  - atbx per primary: Continue vancomycin, cefepime, clindamycin  - IR for possible lung versus liver lesion biopsy on Monday    -thank you for the consult     Tobacco use disorder  Encounter for cessation of tobacco use  - 1 pack/day smoker since age 14  - Nicotine patch       I spent 60 minutes in the professional and overall care of this patient.

## 2024-03-03 NOTE — PROGRESS NOTES
03/03/24 1455   Discharge Planning   Living Arrangements Spouse/significant other;Children   Support Systems Spouse/significant other;Children   Assistance Needed none   Type of Residence Private residence   Number of Stairs to Enter Residence 1   Do you have animals or pets at home? Yes   Type of Animals or Pets dogs   Who is requesting discharge planning? Provider   Home or Post Acute Services None   Patient expects to be discharged to: Home     Spoke with patient and his wife at bedside, patient declines having any home going needs at this time, will continue to follow. Patient admits he does not have a PCP, provided list for patient to choose from. Plan is home, no needs.

## 2024-03-03 NOTE — PROGRESS NOTES
"General Surgery Progress Note    Patient: Ritesh Luther  Unit/Bed: 1119/1119-A  YOB: 1980  MRN: [id number]  Acct: [id number]   Admitting Diagnosis: Wound infection [T14.8XXA, L08.9]  Chest wall mass [R22.2]  Sepsis, due to unspecified organism, unspecified whether acute organ dysfunction present (CMS/[facility]) [A41.9]  Date:  3/2/2024  Hospital Day: 1  Attending: Chuckie Villegas MD    Complaint:  Chief Complaint   Patient presents with    Abscess     Pt seen yesterday in ED, diagnosed with abscess on right side, pt states has since \"burst open\"      Subjective  Patient seen and examined this morning. No acute events overnight. Patient denies nausea and vomiting. Denies abdominal pain. Endorses right chest wall pain. He states when he coughs is when it really hurts. Per Rn this morning overnight dressing was changed due to a lot of purulent drainage.     PHYSICAL EXAM:  Physical Exam  Vitals reviewed.   Constitutional:       Appearance: Normal appearance.      Comments: Chronically ill and frail in appearance    HENT:      Head: Normocephalic.      Nose: Nose normal.      Mouth/Throat:      Mouth: Mucous membranes are moist.   Cardiovascular:      Rate and Rhythm: Normal rate.   Pulmonary:      Effort: Pulmonary effort is normal.   Chest:      Comments: Right chest wall with a large necrotic lesion with foul-smelling purulent drainage. Tender to palpation in the area of the lesion.  Abdominal:      General: Abdomen is flat. Bowel sounds are normal.      Palpations: Abdomen is soft.   Skin:     General: Skin is warm.      Capillary Refill: Capillary refill takes less than 2 seconds.   Neurological:      General: No focal deficit present.      Mental Status: He is alert and oriented to person, place, and time.   Psychiatric:         Mood and Affect: Mood normal.       Vital signs in last 24 hours:  Vitals:    03/03/24 0553   BP: 110/67   Pulse: 78   Resp:    Temp: 36.7 °C (98.1 °F)   SpO2: 97% "     Intake/Output this shift:    Intake/Output Summary (Last 24 hours) at 3/3/2024 0738  Last data filed at 3/2/2024 2359  Gross per 24 hour   Intake 350 ml   Output --   Net 350 ml      Allergies:  Allergies   Allergen Reactions    Amoxicillin Other     shakiness      Medications:  Scheduled medications  cefepime, 1 g, intravenous, q8h  clindamycin, 300 mg, oral, 4x daily  lactobacillus acidophilus, 1 tablet, oral, TID  nicotine, 1 patch, transdermal, Daily  vancomycin, 1,000 mg, intravenous, q12h      Continuous medications     PRN medications  PRN medications: acetaminophen **OR** acetaminophen **OR** acetaminophen, oxyCODONE, polyethylene glycol, vancomycin  Labs:  Results for orders placed or performed during the hospital encounter of 03/02/24 (from the past 24 hour(s))   Blood Culture    Specimen: Peripheral Venipuncture; Blood culture   Result Value Ref Range    Blood Culture Loaded on Instrument - Culture in progress    Tissue/Wound Culture/Smear    Specimen: Skin/Superficial Abscess; Tissue/Biopsy   Result Value Ref Range    Gram Stain (3+) Moderate Polymorphonuclear leukocytes     Gram Stain (3+) Moderate Mixed Gram positive bacteria    MRSA Surveillance for Vancomycin De-escalation, PCR    Specimen: Anterior Nares; Swab   Result Value Ref Range    MRSA PCR Not Detected Not Detected   Urinalysis with Reflex Culture and Microscopic   Result Value Ref Range    Color, Urine Yellow Straw, Yellow    Appearance, Urine Clear Clear    Specific Gravity, Urine >1.060 (A) 1.005 - 1.035    pH, Urine 6.0 5.0, 5.5, 6.0, 6.5, 7.0, 7.5, 8.0    Protein, Urine NEGATIVE NEGATIVE mg/dL    Glucose, Urine NEGATIVE NEGATIVE mg/dL    Blood, Urine NEGATIVE NEGATIVE    Ketones, Urine NEGATIVE NEGATIVE mg/dL    Bilirubin, Urine NEGATIVE NEGATIVE    Urobilinogen, Urine <2.0 <2.0 mg/dL    Nitrite, Urine NEGATIVE NEGATIVE    Leukocyte Esterase, Urine NEGATIVE NEGATIVE   Extra Urine Gray Tube   Result Value Ref Range    Extra Tube Hold  for add-ons.    Vancomycin   Result Value Ref Range    Vancomycin 19.6 5.0 - 20.0 ug/mL   SST TOP   Result Value Ref Range    Extra Tube Hold for add-ons.    Vancomycin   Result Value Ref Range    Vancomycin 10.7 5.0 - 20.0 ug/mL   CBC and Auto Differential   Result Value Ref Range    WBC 10.1 4.4 - 11.3 x10*3/uL    nRBC 0.0 0.0 - 0.0 /100 WBCs    RBC 3.38 (L) 4.50 - 5.90 x10*6/uL    Hemoglobin 9.2 (L) 13.5 - 17.5 g/dL    Hematocrit 28.8 (L) 41.0 - 52.0 %    MCV 85 80 - 100 fL    MCH 27.2 26.0 - 34.0 pg    MCHC 31.9 (L) 32.0 - 36.0 g/dL    RDW 15.4 (H) 11.5 - 14.5 %    Platelets 424 150 - 450 x10*3/uL    Neutrophils % 65.2 40.0 - 80.0 %    Immature Granulocytes %, Automated 2.4 (H) 0.0 - 0.9 %    Lymphocytes % 21.4 13.0 - 44.0 %    Monocytes % 7.5 2.0 - 10.0 %    Eosinophils % 2.5 0.0 - 6.0 %    Basophils % 1.0 0.0 - 2.0 %    Neutrophils Absolute 6.57 1.20 - 7.70 x10*3/uL    Immature Granulocytes Absolute, Automated 0.24 0.00 - 0.70 x10*3/uL    Lymphocytes Absolute 2.16 1.20 - 4.80 x10*3/uL    Monocytes Absolute 0.76 0.10 - 1.00 x10*3/uL    Eosinophils Absolute 0.25 0.00 - 0.70 x10*3/uL    Basophils Absolute 0.10 0.00 - 0.10 x10*3/uL   Basic Metabolic Panel   Result Value Ref Range    Glucose 97 74 - 99 mg/dL    Sodium 133 (L) 136 - 145 mmol/L    Potassium 3.1 (L) 3.5 - 5.3 mmol/L    Chloride 96 (L) 98 - 107 mmol/L    Bicarbonate 29 21 - 32 mmol/L    Anion Gap 11 10 - 20 mmol/L    Urea Nitrogen 13 6 - 23 mg/dL    Creatinine 0.69 0.50 - 1.30 mg/dL    eGFR >90 >60 mL/min/1.73m*2    Calcium 9.2 8.6 - 10.3 mg/dL      Imaging:  CT chest abdomen pelvis w IV contrast    Result Date: 3/2/2024  Interpreted By:  Nohelia Gonzalez, STUDY: CT CHEST ABDOMEN PELVIS W IV CONTRAST;  3/2/2024 7:02 am   INDICATION: Signs/Symptoms:mass/abscess.   COMPARISON: CT chest abdomen pelvis dated 02/18/2024   ACCESSION NUMBER(S): [id number]   ORDERING CLINICIAN: ALLISON KEMP   TECHNIQUE: CT of the chest, abdomen, and pelvis was performed  after administration of intravenous contrast. Contiguous axial images were obtained at 3 mm slice thickness through the chest, abdomen and pelvis. Coronal and sagittal reconstructions at 3 mm slice thickness were performed. 75 mL ml of contrast 250 mg iohexol were administered intravenously without immediate complication.   FINDINGS: CHEST:   LUNG/PLEURA/LARGE AIRWAYS: There is persistent evidence of pleural thickening with reticular subpleural airspace opacity with traction bronchiectasis in the anterior aspect of right middle lobe, slightly less pronounced on today's examination compared to immediate prior imaging with persistent evidence of moderate residual. There is also focal subpleural nodular airspace opacity in the lateral aspect of the right middle lobe, less pronounced on today's imaging compared to prior. There is persistent evidence of focal pleural thickening along the anterior aspect of the right upper lobe, similar compared to immediate prior CT examination. There is also a subpleural airspace opacity along the medial aspect of the right lower lobe measuring approximately 1 cm and is similar compared to immediate prior imaging. There are mild centrilobular emphysematous changes in bilateral lungs. Left lung is relatively clear.       VESSELS: Thoracic aorta is normal in course and caliber. Pulmonary artery is within normal limits for size.   HEART: Heart is normal in size. No pericardial effusion.   MEDIASTINUM AND YUVAL: There are mildly prominent mediastinal lymph nodes, similar compared to prior CT examination. Few calcified bilateral perihilar lymph nodes are noted. There are mildly prominent enlarged right axillary lymph nodes with a prominent lymph node measuring up to 1 cm. These are similar compared to prior CT examination. Thoracic esophagus appears grossly unremarkable.   CHEST WALL AND LOWER NECK: There is persistent evidence of a loculated fluid collection/necrotic mass extending along the  right lateral chest wall soft tissues. There has been significant interval increase in the size of right chest wall mass extending into the thoracic cavity and into the abdominal cavity with multiple loculations and associated indentation on the right lobe of liver. This mass measures approximately 10 x 10 x 6.5 cm compared 8 x 7 x 3 cm on prior CT examination. There is also mild interval increase in intra-abdominal extension of this mass causing increased indentation of the right lobe of liver compared to prior CT examination. However there is interval increase in subdiaphragmatic extension of this mass measuring approximately 3.5 x 3 x 2.5 cm.       ABDOMEN:   LIVER: There is persistent evidence of indentation along the peripheral aspect of the right lobe of liver secondary to intra-abdominal extension of the chest wall mass, slightly increased compared to prior CT examination. Otherwise liver is unremarkable without intrahepatic mass lesions.   BILE DUCTS: Intrahepatic or extrahepatic biliary ductal dilatation.   GALLBLADDER: Gallbladder is moderately distended and does not demonstrate calcified stones in the lumen.   PANCREAS: The pancreas appears unremarkable without evidence of ductal dilatation or masses.   SPLEEN: Spleen demonstrates multiple focal calcifications, suggestive of granulomatous disease.   ADRENAL GLANDS: Bilateral adrenal glands are unremarkable.   KIDNEYS AND URETERS: Bilateral kidneys enhance symmetrically. Hydronephrosis.   PELVIS:   BLADDER: Urinary bladder is not well distended and is limited in evaluation.   REPRODUCTIVE ORGANS: Prostate gland is unremarkable.   BOWEL: Stomach is not well distended limiting evaluation. Small bowel loops are normal in course and caliber and does not demonstrate segmental distention to suggest obstruction. Large bowel demonstrates moderate stool volume and appears grossly unremarkable. Appendix is visualized and appears unremarkable.     VESSELS: Mild  atherosclerotic calcifications of the abdominal aorta are noted.   PERITONEUM/RETROPERITONEUM/LYMPH NODES: There is no free or loculated fluid collection, no free intraperitoneal air. The retroperitoneum appears normal.  No evidence of enlarged lymphadenopathy in the abdomen and pelvis.   BONE AND SOFT TISSUE: No suspicious osseous lesions. The right thoracic rib cage that is in close proximity to the aforementioned mass does not demonstrate osseous erosions or intramedullary involvement. Abdominal wall soft tissues otherwise appear grossly unremarkable except for the mass mentioned above.       CHEST: 1.  Large necrotic mass/loculated collection in the right lateral lower chest/abdominal wall extending into the thoracic and abdominal cavity demonstrating significant interval increase in size compared to prior CT examination dated 02/18/2024. This measures approximately 10 x 10 x 6.5 cm compared to 8 x 7 x 3 cm on immediate prior CT examination dated 02/18/2024. There is also interval increase in intra-abdominal extension of this mass causing increased indentation on the right lobe of liver compared to prior CT examination as described above. 2. There is persistent evidence of subpleural thickening and reticulation in the anterior aspect of the right middle lobe, similar to slightly improved compared to prior CT examination. 3. Redemonstration of multiple spiculated pulmonary nodules in the right upper lobe, right middle lobe and right lower lobe as described above, similar compared to immediate prior CT examination. Recommend continued attention on follow-up imaging.   ABDOMEN-PELVIS: 1.  No acute abnormality in the abdomen and pelvis.     MACRO: None   Signed by: Nohelia Gonzalez 3/2/2024 8:29 AM Dictation workstation:   [id number]     Assessment  POD 1 I&D Right chest wall necrotic abscess     On exam right chest wall tender with palpation. Abscess still draining purulent malodorous drainage. Packing removed.  Redness around abscess. Wound bed necrotic tissue and red. Wound repacked and covered with ABD. Labs this morning show hypokalemia. No leukocytosis. Patient Afebrile. Discussed with patient and wife debridement of wound in OR under general anesthesia tomorrow. Both in agreement.      Plan  -Continue diet as tolerated  -NPO at midnight for debridement in OR tomorrow  -Pain control  -Nausea control  -DVT prophylaxis-per primary team  -Pulmonary toileting   -Encourage ambulation  -Continue care per primary team       Further recommendations per Dr. Shaye Barker, APRN-CNP    Time spent  37  minutes obtaining labs, imaging, recommendations, interview, assessment, examination, medication review/ordering, and EMR review.    Plan of care was discussed extensively with patient. Patient verbalized understanding through teach back method. All questions and concerns addressed upon examination.     Of note, this documentation is completed using the Dragon Dictation system (voice recognition software). There may be spelling and/or grammatical errors that were not corrected prior to final submission.

## 2024-03-03 NOTE — CARE PLAN
The patient's goals for the shift include Free from pain    The clinical goals for the shift include Pt. will be free from falls by end of shift    Problem: Skin  Goal: Decreased wound size/increased tissue granulation at next dressing change  Outcome: Progressing  Goal: Participates in plan/prevention/treatment measures  Outcome: Met  Goal: Prevent/manage excess moisture  Outcome: Met  Goal: Prevent/minimize sheer/friction injuries  Outcome: Met  Goal: Promote/optimize nutrition  Outcome: Met  Goal: Promote skin healing  Outcome: Met    Problem: Respiratory  Goal: Minimize anxiety/maximize coping throughout shift  Outcome: Progressing  Goal: Increase self care and/or family involvement in next 24 hours  Outcome: Met     Problem: Nutrition  Goal: Adequate PO fluid intake  Outcome: Progressing     Problem: Pain - Adult  Goal: Verbalizes/displays adequate comfort level or baseline comfort level  Outcome: Met     Problem: Safety - Adult  Goal: Free from fall injury  Outcome: Met     Problem: Chronic Conditions and Co-morbidities  Goal: Patient's chronic conditions and co-morbidity symptoms are monitored and maintained or improved  Outcome: Progressing     Problem: Fall/Injury  Goal: Not fall by end of shift  Outcome: Met  Goal: Be free from injury by end of the shift  Outcome: Met     Problem: Pain  Goal: Takes deep breaths with improved pain control throughout the shift  Outcome: Met  Goal: Turns in bed with improved pain control throughout the shift  Outcome: Met  Goal: Walks with improved pain control throughout the shift  Outcome: Met  Goal: Performs ADL's with improved pain control throughout shift  Outcome: Met

## 2024-03-04 ENCOUNTER — ANESTHESIA EVENT (OUTPATIENT)
Dept: OPERATING ROOM | Facility: HOSPITAL | Age: 44
End: 2024-03-04
Payer: COMMERCIAL

## 2024-03-04 ENCOUNTER — APPOINTMENT (OUTPATIENT)
Dept: RADIOLOGY | Facility: HOSPITAL | Age: 44
End: 2024-03-04
Payer: COMMERCIAL

## 2024-03-04 ENCOUNTER — ANESTHESIA (OUTPATIENT)
Dept: OPERATING ROOM | Facility: HOSPITAL | Age: 44
End: 2024-03-04
Payer: COMMERCIAL

## 2024-03-04 ENCOUNTER — SOCIAL WORK (OUTPATIENT)
Dept: CASE MANAGEMENT | Facility: HOSPITAL | Age: 44
End: 2024-03-04
Payer: COMMERCIAL

## 2024-03-04 DIAGNOSIS — R91.8 LUNG MASS: Primary | ICD-10-CM

## 2024-03-04 PROBLEM — T14.8XXA WOUND INFECTION: Status: ACTIVE | Noted: 2024-03-02

## 2024-03-04 PROBLEM — F17.200 CURRENT SMOKER: Status: ACTIVE | Noted: 2024-03-04

## 2024-03-04 PROBLEM — L08.9 WOUND INFECTION: Status: ACTIVE | Noted: 2024-03-02

## 2024-03-04 LAB
ANION GAP SERPL CALC-SCNC: 12 MMOL/L (ref 10–20)
BASOPHILS # BLD AUTO: 0.1 X10*3/UL (ref 0–0.1)
BASOPHILS NFR BLD AUTO: 1.1 %
BUN SERPL-MCNC: 14 MG/DL (ref 6–23)
CALCIUM SERPL-MCNC: 9 MG/DL (ref 8.6–10.3)
CHLORIDE SERPL-SCNC: 99 MMOL/L (ref 98–107)
CO2 SERPL-SCNC: 29 MMOL/L (ref 21–32)
CREAT SERPL-MCNC: 0.73 MG/DL (ref 0.5–1.3)
EGFRCR SERPLBLD CKD-EPI 2021: >90 ML/MIN/1.73M*2
EOSINOPHIL # BLD AUTO: 0.4 X10*3/UL (ref 0–0.7)
EOSINOPHIL NFR BLD AUTO: 4.2 %
ERYTHROCYTE [DISTWIDTH] IN BLOOD BY AUTOMATED COUNT: 15.4 % (ref 11.5–14.5)
GLUCOSE SERPL-MCNC: 98 MG/DL (ref 74–99)
HCT VFR BLD AUTO: 29 % (ref 41–52)
HGB BLD-MCNC: 9.4 G/DL (ref 13.5–17.5)
HOLD SPECIMEN: NORMAL
IMM GRANULOCYTES # BLD AUTO: 0.34 X10*3/UL (ref 0–0.7)
IMM GRANULOCYTES NFR BLD AUTO: 3.6 % (ref 0–0.9)
LYMPHOCYTES # BLD AUTO: 2.47 X10*3/UL (ref 1.2–4.8)
LYMPHOCYTES NFR BLD AUTO: 26.2 %
MCH RBC QN AUTO: 27.8 PG (ref 26–34)
MCHC RBC AUTO-ENTMCNC: 32.4 G/DL (ref 32–36)
MCV RBC AUTO: 86 FL (ref 80–100)
MONOCYTES # BLD AUTO: 0.59 X10*3/UL (ref 0.1–1)
MONOCYTES NFR BLD AUTO: 6.3 %
NEUTROPHILS # BLD AUTO: 5.53 X10*3/UL (ref 1.2–7.7)
NEUTROPHILS NFR BLD AUTO: 58.6 %
NRBC BLD-RTO: 0 /100 WBCS (ref 0–0)
PLATELET # BLD AUTO: 447 X10*3/UL (ref 150–450)
POTASSIUM SERPL-SCNC: 3.6 MMOL/L (ref 3.5–5.3)
RBC # BLD AUTO: 3.38 X10*6/UL (ref 4.5–5.9)
SODIUM SERPL-SCNC: 136 MMOL/L (ref 136–145)
WBC # BLD AUTO: 9.4 X10*3/UL (ref 4.4–11.3)

## 2024-03-04 PROCEDURE — 87070 CULTURE OTHR SPECIMN AEROBIC: CPT | Mod: ELYLAB | Performed by: STUDENT IN AN ORGANIZED HEALTH CARE EDUCATION/TRAINING PROGRAM

## 2024-03-04 PROCEDURE — 7100000002 HC RECOVERY ROOM TIME - EACH INCREMENTAL 1 MINUTE: Performed by: SURGERY

## 2024-03-04 PROCEDURE — 0FD03ZX EXTRACTION OF LIVER, PERCUTANEOUS APPROACH, DIAGNOSTIC: ICD-10-PCS | Performed by: RADIOLOGY

## 2024-03-04 PROCEDURE — 7100000001 HC RECOVERY ROOM TIME - INITIAL BASE CHARGE: Performed by: SURGERY

## 2024-03-04 PROCEDURE — 70553 MRI BRAIN STEM W/O & W/DYE: CPT

## 2024-03-04 PROCEDURE — 0JB60ZZ EXCISION OF CHEST SUBCUTANEOUS TISSUE AND FASCIA, OPEN APPROACH: ICD-10-PCS | Performed by: SURGERY

## 2024-03-04 PROCEDURE — 2720000007 HC OR 272 NO HCPCS

## 2024-03-04 PROCEDURE — 88112 CYTOPATH CELL ENHANCE TECH: CPT | Performed by: PATHOLOGY

## 2024-03-04 PROCEDURE — A9575 INJ GADOTERATE MEGLUMI 0.1ML: HCPCS | Performed by: STUDENT IN AN ORGANIZED HEALTH CARE EDUCATION/TRAINING PROGRAM

## 2024-03-04 PROCEDURE — 2500000005 HC RX 250 GENERAL PHARMACY W/O HCPCS: Performed by: RADIOLOGY

## 2024-03-04 PROCEDURE — 88304 TISSUE EXAM BY PATHOLOGIST: CPT | Performed by: PATHOLOGY

## 2024-03-04 PROCEDURE — 3600000002 HC OR TIME - INITIAL BASE CHARGE - PROCEDURE LEVEL TWO: Performed by: SURGERY

## 2024-03-04 PROCEDURE — 80048 BASIC METABOLIC PNL TOTAL CA: CPT | Performed by: STUDENT IN AN ORGANIZED HEALTH CARE EDUCATION/TRAINING PROGRAM

## 2024-03-04 PROCEDURE — 88112 CYTOPATH CELL ENHANCE TECH: CPT | Mod: TC | Performed by: STUDENT IN AN ORGANIZED HEALTH CARE EDUCATION/TRAINING PROGRAM

## 2024-03-04 PROCEDURE — 2500000001 HC RX 250 WO HCPCS SELF ADMINISTERED DRUGS (ALT 637 FOR MEDICARE OP): Performed by: NURSE PRACTITIONER

## 2024-03-04 PROCEDURE — 99233 SBSQ HOSP IP/OBS HIGH 50: CPT | Performed by: STUDENT IN AN ORGANIZED HEALTH CARE EDUCATION/TRAINING PROGRAM

## 2024-03-04 PROCEDURE — 3700000001 HC GENERAL ANESTHESIA TIME - INITIAL BASE CHARGE: Performed by: SURGERY

## 2024-03-04 PROCEDURE — S4991 NICOTINE PATCH NONLEGEND: HCPCS | Performed by: STUDENT IN AN ORGANIZED HEALTH CARE EDUCATION/TRAINING PROGRAM

## 2024-03-04 PROCEDURE — 2500000004 HC RX 250 GENERAL PHARMACY W/ HCPCS (ALT 636 FOR OP/ED): Performed by: ANESTHESIOLOGY

## 2024-03-04 PROCEDURE — 0JB60ZX EXCISION OF CHEST SUBCUTANEOUS TISSUE AND FASCIA, OPEN APPROACH, DIAGNOSTIC: ICD-10-PCS | Performed by: SURGERY

## 2024-03-04 PROCEDURE — 87075 CULTR BACTERIA EXCEPT BLOOD: CPT | Mod: ELYLAB | Performed by: STUDENT IN AN ORGANIZED HEALTH CARE EDUCATION/TRAINING PROGRAM

## 2024-03-04 PROCEDURE — 2500000002 HC RX 250 W HCPCS SELF ADMINISTERED DRUGS (ALT 637 FOR MEDICARE OP, ALT 636 FOR OP/ED): Performed by: STUDENT IN AN ORGANIZED HEALTH CARE EDUCATION/TRAINING PROGRAM

## 2024-03-04 PROCEDURE — 11042 DBRDMT SUBQ TIS 1ST 20SQCM/<: CPT | Performed by: SURGERY

## 2024-03-04 PROCEDURE — 36415 COLL VENOUS BLD VENIPUNCTURE: CPT | Performed by: STUDENT IN AN ORGANIZED HEALTH CARE EDUCATION/TRAINING PROGRAM

## 2024-03-04 PROCEDURE — 2500000004 HC RX 250 GENERAL PHARMACY W/ HCPCS (ALT 636 FOR OP/ED): Performed by: REGISTERED NURSE

## 2024-03-04 PROCEDURE — 2500000001 HC RX 250 WO HCPCS SELF ADMINISTERED DRUGS (ALT 637 FOR MEDICARE OP): Performed by: STUDENT IN AN ORGANIZED HEALTH CARE EDUCATION/TRAINING PROGRAM

## 2024-03-04 PROCEDURE — 88305 TISSUE EXAM BY PATHOLOGIST: CPT | Performed by: PATHOLOGY

## 2024-03-04 PROCEDURE — 2500000004 HC RX 250 GENERAL PHARMACY W/ HCPCS (ALT 636 FOR OP/ED): Performed by: SURGERY

## 2024-03-04 PROCEDURE — 99024 POSTOP FOLLOW-UP VISIT: CPT | Performed by: SURGERY

## 2024-03-04 PROCEDURE — 2500000004 HC RX 250 GENERAL PHARMACY W/ HCPCS (ALT 636 FOR OP/ED)

## 2024-03-04 PROCEDURE — 70553 MRI BRAIN STEM W/O & W/DYE: CPT | Performed by: RADIOLOGY

## 2024-03-04 PROCEDURE — A4217 STERILE WATER/SALINE, 500 ML: HCPCS | Performed by: SURGERY

## 2024-03-04 PROCEDURE — 77012 CT SCAN FOR NEEDLE BIOPSY: CPT

## 2024-03-04 PROCEDURE — 2500000005 HC RX 250 GENERAL PHARMACY W/O HCPCS

## 2024-03-04 PROCEDURE — 0752T DGTZ GLS MCRSCP SLD LVL III: CPT | Mod: TC,ELYLAB,WESLAB | Performed by: REGISTERED NURSE

## 2024-03-04 PROCEDURE — 85025 COMPLETE CBC W/AUTO DIFF WBC: CPT | Performed by: STUDENT IN AN ORGANIZED HEALTH CARE EDUCATION/TRAINING PROGRAM

## 2024-03-04 PROCEDURE — 49405 IMAGE CATH FLUID COLXN VISC: CPT | Performed by: RADIOLOGY

## 2024-03-04 PROCEDURE — 99233 SBSQ HOSP IP/OBS HIGH 50: CPT | Performed by: REGISTERED NURSE

## 2024-03-04 PROCEDURE — 2500000005 HC RX 250 GENERAL PHARMACY W/O HCPCS: Performed by: SURGERY

## 2024-03-04 PROCEDURE — 3700000002 HC GENERAL ANESTHESIA TIME - EACH INCREMENTAL 1 MINUTE: Performed by: SURGERY

## 2024-03-04 PROCEDURE — 2500000001 HC RX 250 WO HCPCS SELF ADMINISTERED DRUGS (ALT 637 FOR MEDICARE OP): Performed by: REGISTERED NURSE

## 2024-03-04 PROCEDURE — 87205 SMEAR GRAM STAIN: CPT | Mod: ELYLAB | Performed by: REGISTERED NURSE

## 2024-03-04 PROCEDURE — 3600000007 HC OR TIME - EACH INCREMENTAL 1 MINUTE - PROCEDURE LEVEL TWO: Performed by: SURGERY

## 2024-03-04 PROCEDURE — 2720000007 HC OR 272 NO HCPCS: Performed by: SURGERY

## 2024-03-04 PROCEDURE — 2500000004 HC RX 250 GENERAL PHARMACY W/ HCPCS (ALT 636 FOR OP/ED): Performed by: STUDENT IN AN ORGANIZED HEALTH CARE EDUCATION/TRAINING PROGRAM

## 2024-03-04 PROCEDURE — 1210000001 HC SEMI-PRIVATE ROOM DAILY

## 2024-03-04 PROCEDURE — 2550000001 HC RX 255 CONTRASTS: Performed by: STUDENT IN AN ORGANIZED HEALTH CARE EDUCATION/TRAINING PROGRAM

## 2024-03-04 RX ORDER — PROPOFOL 10 MG/ML
INJECTION, EMULSION INTRAVENOUS AS NEEDED
Status: DISCONTINUED | OUTPATIENT
Start: 2024-03-04 | End: 2024-03-04

## 2024-03-04 RX ORDER — BUPIVACAINE HYDROCHLORIDE 2.5 MG/ML
INJECTION, SOLUTION INFILTRATION; PERINEURAL AS NEEDED
Status: DISCONTINUED | OUTPATIENT
Start: 2024-03-04 | End: 2024-03-04 | Stop reason: HOSPADM

## 2024-03-04 RX ORDER — DEXAMETHASONE SODIUM PHOSPHATE 4 MG/ML
INJECTION, SOLUTION INTRA-ARTICULAR; INTRALESIONAL; INTRAMUSCULAR; INTRAVENOUS; SOFT TISSUE AS NEEDED
Status: DISCONTINUED | OUTPATIENT
Start: 2024-03-04 | End: 2024-03-04

## 2024-03-04 RX ORDER — BUPIVACAINE HCL/EPINEPHRINE 0.25-.0005
VIAL (ML) INJECTION AS NEEDED
Status: DISCONTINUED | OUTPATIENT
Start: 2024-03-04 | End: 2024-03-04 | Stop reason: HOSPADM

## 2024-03-04 RX ORDER — FENTANYL CITRATE 50 UG/ML
INJECTION, SOLUTION INTRAMUSCULAR; INTRAVENOUS AS NEEDED
Status: DISCONTINUED | OUTPATIENT
Start: 2024-03-04 | End: 2024-03-04

## 2024-03-04 RX ORDER — MEPERIDINE HYDROCHLORIDE 25 MG/ML
12.5 INJECTION INTRAMUSCULAR; INTRAVENOUS; SUBCUTANEOUS EVERY 10 MIN PRN
Status: DISCONTINUED | OUTPATIENT
Start: 2024-03-04 | End: 2024-03-04 | Stop reason: HOSPADM

## 2024-03-04 RX ORDER — LIDOCAINE HYDROCHLORIDE 20 MG/ML
INJECTION, SOLUTION EPIDURAL; INFILTRATION; INTRACAUDAL; PERINEURAL
Status: COMPLETED | OUTPATIENT
Start: 2024-03-04 | End: 2024-03-04

## 2024-03-04 RX ORDER — LIDOCAINE HYDROCHLORIDE 20 MG/ML
INJECTION, SOLUTION INFILTRATION; PERINEURAL AS NEEDED
Status: DISCONTINUED | OUTPATIENT
Start: 2024-03-04 | End: 2024-03-04

## 2024-03-04 RX ORDER — IBUPROFEN 200 MG
1 TABLET ORAL ONCE
Status: COMPLETED | OUTPATIENT
Start: 2024-03-04 | End: 2024-03-05

## 2024-03-04 RX ORDER — LIDOCAINE HYDROCHLORIDE 10 MG/ML
5 INJECTION INFILTRATION; PERINEURAL ONCE
Status: DISCONTINUED | OUTPATIENT
Start: 2024-03-04 | End: 2024-03-04

## 2024-03-04 RX ORDER — GADOTERATE MEGLUMINE 376.9 MG/ML
0.2 INJECTION INTRAVENOUS
Status: COMPLETED | OUTPATIENT
Start: 2024-03-04 | End: 2024-03-04

## 2024-03-04 RX ORDER — ONDANSETRON HYDROCHLORIDE 2 MG/ML
4 INJECTION, SOLUTION INTRAVENOUS ONCE AS NEEDED
Status: DISCONTINUED | OUTPATIENT
Start: 2024-03-04 | End: 2024-03-04 | Stop reason: HOSPADM

## 2024-03-04 RX ORDER — ROCURONIUM BROMIDE 10 MG/ML
INJECTION, SOLUTION INTRAVENOUS AS NEEDED
Status: DISCONTINUED | OUTPATIENT
Start: 2024-03-04 | End: 2024-03-04

## 2024-03-04 RX ORDER — FENTANYL CITRATE 50 UG/ML
50 INJECTION, SOLUTION INTRAMUSCULAR; INTRAVENOUS EVERY 5 MIN PRN
Status: DISCONTINUED | OUTPATIENT
Start: 2024-03-04 | End: 2024-03-04 | Stop reason: HOSPADM

## 2024-03-04 RX ORDER — MIDAZOLAM HYDROCHLORIDE 1 MG/ML
INJECTION, SOLUTION INTRAMUSCULAR; INTRAVENOUS AS NEEDED
Status: DISCONTINUED | OUTPATIENT
Start: 2024-03-04 | End: 2024-03-04

## 2024-03-04 RX ORDER — SODIUM CHLORIDE 0.9 G/100ML
IRRIGANT IRRIGATION AS NEEDED
Status: DISCONTINUED | OUTPATIENT
Start: 2024-03-04 | End: 2024-03-04 | Stop reason: HOSPADM

## 2024-03-04 RX ORDER — ONDANSETRON HYDROCHLORIDE 2 MG/ML
INJECTION, SOLUTION INTRAVENOUS AS NEEDED
Status: DISCONTINUED | OUTPATIENT
Start: 2024-03-04 | End: 2024-03-04

## 2024-03-04 RX ORDER — SODIUM CHLORIDE, SODIUM LACTATE, POTASSIUM CHLORIDE, CALCIUM CHLORIDE 600; 310; 30; 20 MG/100ML; MG/100ML; MG/100ML; MG/100ML
INJECTION, SOLUTION INTRAVENOUS CONTINUOUS PRN
Status: DISCONTINUED | OUTPATIENT
Start: 2024-03-04 | End: 2024-03-04

## 2024-03-04 RX ORDER — ALPRAZOLAM 0.5 MG/1
0.5 TABLET ORAL ONCE AS NEEDED
Status: COMPLETED | OUTPATIENT
Start: 2024-03-04 | End: 2024-03-04

## 2024-03-04 RX ORDER — SODIUM CHLORIDE, SODIUM LACTATE, POTASSIUM CHLORIDE, CALCIUM CHLORIDE 600; 310; 30; 20 MG/100ML; MG/100ML; MG/100ML; MG/100ML
100 INJECTION, SOLUTION INTRAVENOUS CONTINUOUS
Status: DISCONTINUED | OUTPATIENT
Start: 2024-03-04 | End: 2024-03-04 | Stop reason: HOSPADM

## 2024-03-04 RX ORDER — OXYCODONE HYDROCHLORIDE 5 MG/1
5 TABLET ORAL EVERY 4 HOURS PRN
Status: DISCONTINUED | OUTPATIENT
Start: 2024-03-04 | End: 2024-03-05 | Stop reason: HOSPADM

## 2024-03-04 RX ORDER — TALC
6 POWDER (GRAM) TOPICAL NIGHTLY PRN
Status: DISCONTINUED | OUTPATIENT
Start: 2024-03-04 | End: 2024-03-05 | Stop reason: HOSPADM

## 2024-03-04 RX ORDER — HYDROXYZINE HYDROCHLORIDE 25 MG/1
50 TABLET, FILM COATED ORAL EVERY 4 HOURS PRN
Status: DISCONTINUED | OUTPATIENT
Start: 2024-03-04 | End: 2024-03-05 | Stop reason: HOSPADM

## 2024-03-04 RX ADMIN — MIDAZOLAM 2 MG: 1 INJECTION INTRAMUSCULAR; INTRAVENOUS at 15:05

## 2024-03-04 RX ADMIN — ALPRAZOLAM 0.5 MG: 0.5 TABLET ORAL at 12:18

## 2024-03-04 RX ADMIN — HYDROMORPHONE HYDROCHLORIDE 0.5 MG: 1 INJECTION, SOLUTION INTRAMUSCULAR; INTRAVENOUS; SUBCUTANEOUS at 16:32

## 2024-03-04 RX ADMIN — FENTANYL CITRATE 50 MCG: 50 INJECTION, SOLUTION INTRAMUSCULAR; INTRAVENOUS at 15:34

## 2024-03-04 RX ADMIN — Medication 6 MG: at 22:00

## 2024-03-04 RX ADMIN — PROPOFOL 200 MG: 10 INJECTION, EMULSION INTRAVENOUS at 15:13

## 2024-03-04 RX ADMIN — FENTANYL CITRATE 50 MCG: 50 INJECTION, SOLUTION INTRAMUSCULAR; INTRAVENOUS at 15:13

## 2024-03-04 RX ADMIN — DEXAMETHASONE SODIUM PHOSPHATE 4 MG: 4 INJECTION, SOLUTION INTRAMUSCULAR; INTRAVENOUS at 15:25

## 2024-03-04 RX ADMIN — ROCURONIUM BROMIDE 50 MG: 10 SOLUTION INTRAVENOUS at 15:13

## 2024-03-04 RX ADMIN — SUGAMMADEX 200 MG: 100 INJECTION, SOLUTION INTRAVENOUS at 16:10

## 2024-03-04 RX ADMIN — NICOTINE 1 PATCH: 21 PATCH, EXTENDED RELEASE TRANSDERMAL at 17:44

## 2024-03-04 RX ADMIN — HYDROMORPHONE HYDROCHLORIDE 0.5 MG: 1 INJECTION, SOLUTION INTRAMUSCULAR; INTRAVENOUS; SUBCUTANEOUS at 19:26

## 2024-03-04 RX ADMIN — CEFEPIME 1 G: 1 INJECTION, POWDER, FOR SOLUTION INTRAMUSCULAR; INTRAVENOUS at 06:35

## 2024-03-04 RX ADMIN — Medication 1 TABLET: at 20:18

## 2024-03-04 RX ADMIN — SODIUM CHLORIDE, SODIUM LACTATE, POTASSIUM CHLORIDE, AND CALCIUM CHLORIDE: 600; 310; 30; 20 INJECTION, SOLUTION INTRAVENOUS at 15:05

## 2024-03-04 RX ADMIN — GADOTERATE MEGLUMINE 14.5 ML: 376.9 INJECTION INTRAVENOUS at 13:20

## 2024-03-04 RX ADMIN — LIDOCAINE HYDROCHLORIDE 60 MG: 20 INJECTION, SOLUTION INFILTRATION; PERINEURAL at 15:13

## 2024-03-04 RX ADMIN — HYDROMORPHONE HYDROCHLORIDE 0.5 MG: 1 INJECTION, SOLUTION INTRAMUSCULAR; INTRAVENOUS; SUBCUTANEOUS at 16:26

## 2024-03-04 RX ADMIN — LIDOCAINE HYDROCHLORIDE 6 ML: 20 INJECTION, SOLUTION EPIDURAL; INFILTRATION; INTRACAUDAL; PERINEURAL at 11:59

## 2024-03-04 RX ADMIN — ROCURONIUM BROMIDE 10 MG: 10 SOLUTION INTRAVENOUS at 15:51

## 2024-03-04 RX ADMIN — Medication 1 G: at 15:19

## 2024-03-04 RX ADMIN — NICOTINE 1 PATCH: 21 PATCH, EXTENDED RELEASE TRANSDERMAL at 09:16

## 2024-03-04 RX ADMIN — CEFEPIME 1 G: 1 INJECTION, POWDER, FOR SOLUTION INTRAMUSCULAR; INTRAVENOUS at 22:57

## 2024-03-04 RX ADMIN — HYDROMORPHONE HYDROCHLORIDE 0.5 MG: 1 INJECTION, SOLUTION INTRAMUSCULAR; INTRAVENOUS; SUBCUTANEOUS at 16:37

## 2024-03-04 RX ADMIN — ONDANSETRON 4 MG: 2 INJECTION INTRAMUSCULAR; INTRAVENOUS at 15:25

## 2024-03-04 SDOH — HEALTH STABILITY: MENTAL HEALTH: CURRENT SMOKER: 1

## 2024-03-04 ASSESSMENT — PAIN - FUNCTIONAL ASSESSMENT
PAIN_FUNCTIONAL_ASSESSMENT: 0-10

## 2024-03-04 ASSESSMENT — PAIN SCALES - GENERAL
PAINLEVEL_OUTOF10: 3
PAINLEVEL_OUTOF10: 6
PAINLEVEL_OUTOF10: 7
PAINLEVEL_OUTOF10: 2
PAINLEVEL_OUTOF10: 5 - MODERATE PAIN

## 2024-03-04 ASSESSMENT — PAIN DESCRIPTION - LOCATION: LOCATION: OTHER (COMMENT)

## 2024-03-04 ASSESSMENT — COGNITIVE AND FUNCTIONAL STATUS - GENERAL
MOBILITY SCORE: 24
MOBILITY SCORE: 24
DAILY ACTIVITIY SCORE: 24
DAILY ACTIVITIY SCORE: 24

## 2024-03-04 ASSESSMENT — PAIN DESCRIPTION - ORIENTATION: ORIENTATION: RIGHT

## 2024-03-04 NOTE — ANESTHESIA PROCEDURE NOTES
Airway  Date/Time: 3/4/2024 3:15 PM  Urgency: elective    Airway not difficult    Staffing  Performed: CRNA   Authorized by: Nav Davies MD    Performed by: NINA Heard-CRNA, DNAP  Patient location during procedure: OR    Indications and Patient Condition  Indications for airway management: anesthesia  Spontaneous ventilation: present  Sedation level: minimal  Preoxygenated: yes  Patient position: sniffing  Mask difficulty assessment: 1 - vent by mask    Final Airway Details  Final airway type: endotracheal airway      Successful airway: ETT  Cuffed: yes   Successful intubation technique: video laryngoscopy  Facilitating devices/methods: intubating stylet  Endotracheal tube insertion site: oral  Blade: Brook  Blade size: #4  ETT size (mm): 7.5  Cormack-Lehane Classification: grade I - full view of glottis  Placement verified by: capnometry   Measured from: teeth  ETT to teeth (cm): 23  Number of attempts at approach: 1

## 2024-03-04 NOTE — CARE PLAN
Problem: Skin  Goal: Decreased wound size/increased tissue granulation at next dressing change  Outcome: Progressing     Problem: Respiratory  Goal: Clear secretions with interventions this shift  Outcome: Progressing  Goal: Minimize anxiety/maximize coping throughout shift  Outcome: Progressing  Goal: Minimal/no exertional discomfort or dyspnea this shift  Outcome: Progressing     Problem: Nutrition  Goal: Consume prescribed supplement  Outcome: Progressing  Goal: Adequate PO fluid intake  Outcome: Progressing  Goal: Lab values WNL  Outcome: Progressing  Goal: Electrolytes WNL  Outcome: Progressing  Goal: Promote healing  Outcome: Progressing  Goal: Maintain stable weight  Outcome: Progressing     Problem: Discharge Planning  Goal: Discharge to home or other facility with appropriate resources  Outcome: Progressing     Problem: Chronic Conditions and Co-morbidities  Goal: Patient's chronic conditions and co-morbidity symptoms are monitored and maintained or improved  Outcome: Progressing     Problem: Fall/Injury  Goal: Verbalize understanding of personal risk factors for fall in the hospital  Outcome: Progressing     Problem: Skin  Goal: Decreased wound size/increased tissue granulation at next dressing change  Outcome: Progressing     Problem: Respiratory  Goal: Clear secretions with interventions this shift  Outcome: Progressing  Goal: Minimize anxiety/maximize coping throughout shift  Outcome: Progressing  Goal: Minimal/no exertional discomfort or dyspnea this shift  Outcome: Progressing     Problem: Nutrition  Goal: Consume prescribed supplement  Outcome: Progressing  Goal: Adequate PO fluid intake  Outcome: Progressing  Goal: Lab values WNL  Outcome: Progressing  Goal: Electrolytes WNL  Outcome: Progressing  Goal: Promote healing  Outcome: Progressing  Goal: Maintain stable weight  Outcome: Progressing     Problem: Discharge Planning  Goal: Discharge to home or other facility with appropriate  resources  Outcome: Progressing     Problem: Chronic Conditions and Co-morbidities  Goal: Patient's chronic conditions and co-morbidity symptoms are monitored and maintained or improved  Outcome: Progressing     Problem: Fall/Injury  Goal: Verbalize understanding of personal risk factors for fall in the hospital  Outcome: Progressing   The patient's goals for the shift include Pain management    The clinical goals for the shift include Labs WNL

## 2024-03-04 NOTE — ANESTHESIA PREPROCEDURE EVALUATION
Ritesh Luther is a 43 y.o. male here for:    Operative debridement of right chest wall and abdominal wall mass  With Flakito Faria MD  Pre-Op Diagnosis Codes:     * Infected wound [T14.8XXA, L08.9]    Relevant Problems   Infectious Disease   (+) Wound infection      Symptoms and Signs   (+) Chest wall mass      Tobacco   (+) Current smoker       Lab Results   Component Value Date    HGB 9.4 (L) 03/04/2024    HCT 29.0 (L) 03/04/2024    WBC 9.4 03/04/2024     03/04/2024     03/04/2024    K 3.6 03/04/2024    CL 99 03/04/2024    CREATININE 0.73 03/04/2024    BUN 14 03/04/2024       Social History     Tobacco Use   Smoking Status Every Day    Packs/day: .5    Types: Cigarettes   Smokeless Tobacco Never       Allergies   Allergen Reactions    Amoxicillin Other     shakiness       Current Outpatient Medications   Medication Instructions    acetaminophen (TYLENOL) 1,000 mg, oral, 3 times daily    lidocaine HCL 4 % adhesive patch,medicated 1 patch, topical (top), Daily, Place on area of pain.    naproxen (NAPROSYN) 250 mg, oral, 2 times daily with meals       Past Surgical History:   Procedure Laterality Date    HAND SURGERY      left hand    INCISION AND DRAINAGE  3/2/2024       No family history on file.    NPO Details:  No data recorded    Physical Exam    Anesthesia Plan    History of general anesthesia?: yes  History of complications of general anesthesia?: no    ASA 2     general     The patient is a current smoker.  Patient was previously instructed to abstain from smoking on day of procedure.    intravenous induction   Postoperative administration of opioids is intended.  Trial extubation is planned.  Anesthetic plan and risks discussed with patient.    Plan discussed with CRNA.

## 2024-03-04 NOTE — PROGRESS NOTES
"General Surgery Progress Note    Patient: Ritesh Luther  Unit/Bed: 1119/1119-A  YOB: 1980  MRN: [id number]  Acct: [id number]   Admitting Diagnosis: Wound infection [T14.8XXA, L08.9]  Chest wall mass [R22.2]  Sepsis, due to unspecified organism, unspecified whether acute organ dysfunction present (CMS/[facility]) [A41.9]  Date:  3/2/2024  Hospital Day: 2  Attending: Chuckie Villegas MD    Complaint:  Chief Complaint   Patient presents with    Abscess     Pt seen yesterday in ED, diagnosed with abscess on right side, pt states has since \"burst open\"      Subjective  Patient seen and examined this morning. No acute events overnight. Patient denies nausea and vomiting. Denies abdominal pain. Endorses right chest wall pain not as bad as it was yesterday. He is anxious and really wants to get home to his children. Patient states he had a small amount of drainage.    PHYSICAL EXAM:  Physical Exam  Vitals reviewed.   Constitutional:       General: He is not in acute distress.  HENT:      Head: Normocephalic.      Nose: Nose normal.      Mouth/Throat:      Mouth: Mucous membranes are moist.   Cardiovascular:      Rate and Rhythm: Normal rate.   Pulmonary:      Effort: Pulmonary effort is normal.   Chest:      Comments: Right chest wall with a large necrotic lesion with foul-smelling purulent drainage. Tender to palpation in the area of the lesion.  Abdominal:      General: Abdomen is flat. Bowel sounds are normal.      Palpations: Abdomen is soft.   Skin:     General: Skin is warm.      Capillary Refill: Capillary refill takes less than 2 seconds.   Neurological:      General: No focal deficit present.      Mental Status: He is alert and oriented to person, place, and time.   Psychiatric:         Mood and Affect: Mood normal.       Vital signs in last 24 hours:  Vitals:    03/04/24 0438   BP: 105/61   Pulse: 64   Resp: 16   Temp: 36 °C (96.8 °F)   SpO2: 97%     Intake/Output this shift:  No intake or output data in " the 24 hours ending 03/04/24 1142     Allergies:  Allergies   Allergen Reactions    Amoxicillin Other     shakiness      Medications:  Scheduled medications  cefepime, 1 g, intravenous, q8h  lactobacillus acidophilus, 1 tablet, oral, TID  lidocaine, 5 mL, infiltration, Once  nicotine, 1 patch, transdermal, Daily      Continuous medications     PRN medications  PRN medications: acetaminophen **OR** acetaminophen **OR** acetaminophen, ALPRAZolam, HYDROmorphone, hydrOXYzine HCL, oxyCODONE, polyethylene glycol  Labs:  Results for orders placed or performed during the hospital encounter of 03/02/24 (from the past 24 hour(s))   CBC and Auto Differential   Result Value Ref Range    WBC 9.4 4.4 - 11.3 x10*3/uL    nRBC 0.0 0.0 - 0.0 /100 WBCs    RBC 3.38 (L) 4.50 - 5.90 x10*6/uL    Hemoglobin 9.4 (L) 13.5 - 17.5 g/dL    Hematocrit 29.0 (L) 41.0 - 52.0 %    MCV 86 80 - 100 fL    MCH 27.8 26.0 - 34.0 pg    MCHC 32.4 32.0 - 36.0 g/dL    RDW 15.4 (H) 11.5 - 14.5 %    Platelets 447 150 - 450 x10*3/uL    Neutrophils % 58.6 40.0 - 80.0 %    Immature Granulocytes %, Automated 3.6 (H) 0.0 - 0.9 %    Lymphocytes % 26.2 13.0 - 44.0 %    Monocytes % 6.3 2.0 - 10.0 %    Eosinophils % 4.2 0.0 - 6.0 %    Basophils % 1.1 0.0 - 2.0 %    Neutrophils Absolute 5.53 1.20 - 7.70 x10*3/uL    Immature Granulocytes Absolute, Automated 0.34 0.00 - 0.70 x10*3/uL    Lymphocytes Absolute 2.47 1.20 - 4.80 x10*3/uL    Monocytes Absolute 0.59 0.10 - 1.00 x10*3/uL    Eosinophils Absolute 0.40 0.00 - 0.70 x10*3/uL    Basophils Absolute 0.10 0.00 - 0.10 x10*3/uL   Basic Metabolic Panel   Result Value Ref Range    Glucose 98 74 - 99 mg/dL    Sodium 136 136 - 145 mmol/L    Potassium 3.6 3.5 - 5.3 mmol/L    Chloride 99 98 - 107 mmol/L    Bicarbonate 29 21 - 32 mmol/L    Anion Gap 12 10 - 20 mmol/L    Urea Nitrogen 14 6 - 23 mg/dL    Creatinine 0.73 0.50 - 1.30 mg/dL    eGFR >90 >60 mL/min/1.73m*2    Calcium 9.0 8.6 - 10.3 mg/dL   SST TOP   Result Value Ref Range     Extra Tube Hold for add-ons.       Imaging:  CT chest abdomen pelvis w IV contrast    Result Date: 3/2/2024  Interpreted By:  Nohelia Gonzalez, STUDY: CT CHEST ABDOMEN PELVIS W IV CONTRAST;  3/2/2024 7:02 am   INDICATION: Signs/Symptoms:mass/abscess.   COMPARISON: CT chest abdomen pelvis dated 02/18/2024   ACCESSION NUMBER(S): [id number]   ORDERING CLINICIAN: ALLISON KEMP   TECHNIQUE: CT of the chest, abdomen, and pelvis was performed after administration of intravenous contrast. Contiguous axial images were obtained at 3 mm slice thickness through the chest, abdomen and pelvis. Coronal and sagittal reconstructions at 3 mm slice thickness were performed. 75 mL ml of contrast 250 mg iohexol were administered intravenously without immediate complication.   FINDINGS: CHEST:   LUNG/PLEURA/LARGE AIRWAYS: There is persistent evidence of pleural thickening with reticular subpleural airspace opacity with traction bronchiectasis in the anterior aspect of right middle lobe, slightly less pronounced on today's examination compared to immediate prior imaging with persistent evidence of moderate residual. There is also focal subpleural nodular airspace opacity in the lateral aspect of the right middle lobe, less pronounced on today's imaging compared to prior. There is persistent evidence of focal pleural thickening along the anterior aspect of the right upper lobe, similar compared to immediate prior CT examination. There is also a subpleural airspace opacity along the medial aspect of the right lower lobe measuring approximately 1 cm and is similar compared to immediate prior imaging. There are mild centrilobular emphysematous changes in bilateral lungs. Left lung is relatively clear.       VESSELS: Thoracic aorta is normal in course and caliber. Pulmonary artery is within normal limits for size.   HEART: Heart is normal in size. No pericardial effusion.   MEDIASTINUM AND YUVAL: There are mildly prominent mediastinal lymph  nodes, similar compared to prior CT examination. Few calcified bilateral perihilar lymph nodes are noted. There are mildly prominent enlarged right axillary lymph nodes with a prominent lymph node measuring up to 1 cm. These are similar compared to prior CT examination. Thoracic esophagus appears grossly unremarkable.   CHEST WALL AND LOWER NECK: There is persistent evidence of a loculated fluid collection/necrotic mass extending along the right lateral chest wall soft tissues. There has been significant interval increase in the size of right chest wall mass extending into the thoracic cavity and into the abdominal cavity with multiple loculations and associated indentation on the right lobe of liver. This mass measures approximately 10 x 10 x 6.5 cm compared 8 x 7 x 3 cm on prior CT examination. There is also mild interval increase in intra-abdominal extension of this mass causing increased indentation of the right lobe of liver compared to prior CT examination. However there is interval increase in subdiaphragmatic extension of this mass measuring approximately 3.5 x 3 x 2.5 cm.       ABDOMEN:   LIVER: There is persistent evidence of indentation along the peripheral aspect of the right lobe of liver secondary to intra-abdominal extension of the chest wall mass, slightly increased compared to prior CT examination. Otherwise liver is unremarkable without intrahepatic mass lesions.   BILE DUCTS: Intrahepatic or extrahepatic biliary ductal dilatation.   GALLBLADDER: Gallbladder is moderately distended and does not demonstrate calcified stones in the lumen.   PANCREAS: The pancreas appears unremarkable without evidence of ductal dilatation or masses.   SPLEEN: Spleen demonstrates multiple focal calcifications, suggestive of granulomatous disease.   ADRENAL GLANDS: Bilateral adrenal glands are unremarkable.   KIDNEYS AND URETERS: Bilateral kidneys enhance symmetrically. Hydronephrosis.   PELVIS:   BLADDER: Urinary  bladder is not well distended and is limited in evaluation.   REPRODUCTIVE ORGANS: Prostate gland is unremarkable.   BOWEL: Stomach is not well distended limiting evaluation. Small bowel loops are normal in course and caliber and does not demonstrate segmental distention to suggest obstruction. Large bowel demonstrates moderate stool volume and appears grossly unremarkable. Appendix is visualized and appears unremarkable.     VESSELS: Mild atherosclerotic calcifications of the abdominal aorta are noted.   PERITONEUM/RETROPERITONEUM/LYMPH NODES: There is no free or loculated fluid collection, no free intraperitoneal air. The retroperitoneum appears normal.  No evidence of enlarged lymphadenopathy in the abdomen and pelvis.   BONE AND SOFT TISSUE: No suspicious osseous lesions. The right thoracic rib cage that is in close proximity to the aforementioned mass does not demonstrate osseous erosions or intramedullary involvement. Abdominal wall soft tissues otherwise appear grossly unremarkable except for the mass mentioned above.       CHEST: 1.  Large necrotic mass/loculated collection in the right lateral lower chest/abdominal wall extending into the thoracic and abdominal cavity demonstrating significant interval increase in size compared to prior CT examination dated 02/18/2024. This measures approximately 10 x 10 x 6.5 cm compared to 8 x 7 x 3 cm on immediate prior CT examination dated 02/18/2024. There is also interval increase in intra-abdominal extension of this mass causing increased indentation on the right lobe of liver compared to prior CT examination as described above. 2. There is persistent evidence of subpleural thickening and reticulation in the anterior aspect of the right middle lobe, similar to slightly improved compared to prior CT examination. 3. Redemonstration of multiple spiculated pulmonary nodules in the right upper lobe, right middle lobe and right lower lobe as described above, similar  compared to immediate prior CT examination. Recommend continued attention on follow-up imaging.   ABDOMEN-PELVIS: 1.  No acute abnormality in the abdomen and pelvis.     MACRO: None   Signed by: Nohelia Gonzalez 3/2/2024 8:29 AM Dictation workstation:   [id number]     Assessment  POD 2 I&D Right chest wall necrotic abscess     On exam right chest wall tender with palpation. Abscess not examined as patient is going to IR for a procedure. Also patient is scheduled to go to the OR for debridement of wound to right chest with Dr. Faria later today. Labs this morning unremarkable. No leukocytosis. Patient Afebrile.    Plan  -NPO  -Pain control  -Nausea control  -DVT prophylaxis-per primary team  -Pulmonary toileting   -Encourage ambulation  -Continue care per primary team   -Plan for OR today with Dr. Faria for wound debridement.       Further recommendations per Dr. Bienvenido Barker, NINA-CNP    Time spent  37  minutes obtaining labs, imaging, recommendations, interview, assessment, examination, medication review/ordering, and EMR review.    Plan of care was discussed extensively with patient. Patient verbalized understanding through teach back method. All questions and concerns addressed upon examination.     Of note, this documentation is completed using the Dragon Dictation system (voice recognition software). There may be spelling and/or grammatical errors that were not corrected prior to final submission.     Agree with note above except were noted below    No acute events overnight, alert and aware, having some drainage from his right chest mass    43-year-old male who presents with right chest wall mass that appeared to be infected, status post I&D at bedside however still having necrotic tissue at baseline  -Plan for IR today for drainage of one of his other abscesses  -Explained to the patient that he would likely need better debridement of this wall mass in order to be able to get better  healing.  Risk, if, alternatives  The patient extend the procedure.  While in OR I will get proper cultures of the mass  -Ok to cont. DVT proh  -OOB, Scds, ISS    Flakito Faria MD  03/04/24  11:51 AM

## 2024-03-04 NOTE — CARE PLAN
The patient's goals for the shift include Pain management    The clinical goals for the shift include Labs WNL    Over the shift, the patient did not make progress toward the following goals. Barriers to progression include Pain perceptions, Disease progression and patient preferences . Recommendations to address these barriers include offer food, medicate, promote comfort and sleep  Problem: Skin  Goal: Decreased wound size/increased tissue granulation at next dressing change  Outcome: Progressing  Flowsheets (Taken 3/3/2024 0046)  Decreased wound size/increased tissue granulation at next dressing change: Promote sleep for wound healing     Problem: Respiratory  Goal: Clear secretions with interventions this shift  Outcome: Progressing  Flowsheets (Taken 3/3/2024 0046)  Clear secretions with interventions this shift:   Encourage/provide pulmonary hygiene/secretion clearance   Med administration/monitoring of effect  Goal: Minimize anxiety/maximize coping throughout shift  Outcome: Progressing  Flowsheets (Taken 3/3/2024 0046)  Minimize anxiety/maximize coping throughout shift: Med administration/monitoring of effect  Goal: Minimal/no exertional discomfort or dyspnea this shift  Outcome: Progressing  Flowsheets (Taken 3/3/2024 0043)  Minimal/no exertional discomfort or dyspnea this shift: Positioning to promote ventilation/comfort     Problem: Nutrition  Goal: Consume prescribed supplement  Outcome: Progressing  Goal: Adequate PO fluid intake  Outcome: Progressing  Goal: Lab values WNL  Outcome: Progressing  Goal: Electrolytes WNL  Outcome: Progressing  Goal: Promote healing  Outcome: Progressing  Goal: Maintain stable weight  Outcome: Progressing     Problem: Discharge Planning  Goal: Discharge to home or other facility with appropriate resources  Outcome: Progressing  Flowsheets (Taken 3/3/2024 0049)  Discharge to home or other facility with appropriate resources:   Identify barriers to discharge with patient and  caregiver   Arrange for needed discharge resources and transportation as appropriate   Identify discharge learning needs (meds, wound care, etc)     Problem: Chronic Conditions and Co-morbidities  Goal: Patient's chronic conditions and co-morbidity symptoms are monitored and maintained or improved  Outcome: Progressing  Flowsheets (Taken 3/3/2024 0049)  Care Plan - Patient's Chronic Conditions and Co-Morbidity Symptoms are Monitored and Maintained or Improved:   Monitor and assess patient's chronic conditions and comorbid symptoms for stability, deterioration, or improvement   Collaborate with multidisciplinary team to address chronic and comorbid conditions and prevent exacerbation or deterioration   Update acute care plan with appropriate goals if chronic or comorbid symptoms are exacerbated and prevent overall improvement and discharge     Problem: Fall/Injury  Goal: Verbalize understanding of personal risk factors for fall in the hospital  Outcome: Progressing   .

## 2024-03-04 NOTE — NURSING NOTE
Spoke with Dr. Villegas and since pt just had fluid aspiration close to liver and pt to have surgery tomorrow we will wait to place midline until we are certain of final IV antibiotic and length of treatment. Per Dr. Villegas's note,    ID following - continuing cefepime (ceftriaxone for 14 days on discharge, may change once cultures finalize) Bedside nurse Geneva also updated.

## 2024-03-04 NOTE — BRIEF OP NOTE
Date: 3/4/2024  OR Location: [facility] OR    Name: Ritesh Luther, : 1980, Age: 43 y.o., MRN: [id number], Sex: male    Diagnosis  Pre-op Diagnosis     * Wound infection [T14.8XXA, L08.9] Post-op Diagnosis     * Wound infection [T14.8XXA, L08.9]     Procedures  Operative debridement of right chest wall and abdominal wall mass  85084 - ME DEBRIDEMENT SUBCUTANEOUS TISSUE 1ST 20 SQ CM/<      Surgeons      * Flakito Faria - Primary    Resident/Fellow/Other Assistant:  Surgeon(s) and Role:    Procedure Summary  Anesthesia: General  ASA: II  Anesthesia Staff: Anesthesiologist: Nav Davies MD  CRNA: NINA Heard-CRNA, DNAP  Estimated Blood Loss: 50mL  Intra-op Medications:   Administrations occurring from 1425 to 1555 on 24:   Medication Name Total Dose   sodium chloride 0.9 % irrigation solution 3,000 mL   BUPivacaine HCl (Marcaine) 0.25 % (2.5 mg/mL) injection 20 mL   cefepime (Maxipime) in dextrose 5% 50 mL IV 1 g Cannot be calculated   lactobacillus acidophilus tablet 1 tablet Cannot be calculated   cefepime (Maxipime) in dextrose 5% 50 mL IV 1 g 1 g              Anesthesia Record               Intraprocedure I/O Totals          Intake    LR infusion 800.00 mL    cefepime (Maxipime) in dextrose 5% 50 mL IV 1 g 50.00 mL    Total Intake 850 mL       Output    Est. Blood Loss 50 mL    Total Output 50 mL       Net    Net Volume 800 mL          Specimen:   ID Type Source Tests Collected by Time   1 : RIGHT CHEST WALL Tissue SOFT TISSUE BIOPSY SURGICAL PATHOLOGY EXAM Flakito Faria MD 3/4/2024 1545   2 : RIGHT CHEST WALL Tissue SOFT TISSUE BIOPSY SURGICAL PATHOLOGY EXAM Flakito Faria MD 3/4/2024 1545   A : RIGHT CHEST WALL FLUID Swab ABSCESS TISSUE/WOUND CULTURE/SMEAR Flakito Faria MD 3/4/2024 1540   B : RIGHT CHEST WALL FLUID Swab ABSCESS TISSUE/WOUND CULTURE/SMEAR Flakito Faria MD 3/4/2024 1541   C : RIGHT CHEST WALL TISSUE Tissue ABSCESS TISSUE/WOUND CULTURE/SMEAR Flakito Faria MD  3/4/2024 1542        Staff:   Circulator: Geneva Barrett RN  Scrub Person: Maddie Woodwardaugustus          Findings: Right chest wall/abdominal wall wound, this was debrided and cultured    Complications:  None; patient tolerated the procedure well.     Disposition: PACU - hemodynamically stable.  Condition: stable  Specimens Collected:   ID Type Source Tests Collected by Time   1 : RIGHT CHEST WALL Tissue SOFT TISSUE BIOPSY SURGICAL PATHOLOGY EXAM Flakito Faria MD 3/4/2024 1545   2 : RIGHT CHEST WALL Tissue SOFT TISSUE BIOPSY SURGICAL PATHOLOGY EXAM Flakito Faria MD 3/4/2024 1545   A : RIGHT CHEST WALL FLUID Swab ABSCESS TISSUE/WOUND CULTURE/SMEAR Flakito Faria MD 3/4/2024 1540   B : RIGHT CHEST WALL FLUID Swab ABSCESS TISSUE/WOUND CULTURE/SMEAR Flakito Faria MD 3/4/2024 1541   C : RIGHT CHEST WALL TISSUE Tissue ABSCESS TISSUE/WOUND CULTURE/SMEAR Flakito Faria MD 3/4/2024 1542     Attending Attestation: I was present and scrubbed for the entire procedure.    Flakito Faria  Phone Number: [phone]

## 2024-03-04 NOTE — CONSULTS
Consults      ID Consult: Thoracic/abdominal right-sided wound      HPI  43-year-old male with open nonhealing nonsurgical wound right chest wall.  Back in January he developed pneumonia, at that time he had lump right flank area and was seen multiple times regarding this lump.  The lump eventually enlarged and erupted with mucopurulent discharge.  Cultures are negative to date.  Surgical evaluation pending.  Unfortunately patient's CAT scan is showing liver lesions, pulmonary nodules, suspicious for metastatic cancer.  He has not yet had biopsy.  ID was consulted regarding right chest wall mass in the setting of his workup for cancer.    Seen with his wife and children at bedside    All 14 ROS discussed with the patient and negative other than as stated as above       has a past medical history of Liver cancer (CMS/HCC) and Lung cancer (CMS/HCC).     has a past surgical history that includes Hand surgery and Incision and Drainage (3/2/2024).     reports that he has been smoking cigarettes. He has been smoking an average of .5 packs per day. He has never used smokeless tobacco. He reports current alcohol use. He reports current drug use. Drug: Marijuana.    Particular family history at this time    Regarding his social history, he has not traveled out of the country.  He has a small pet turtle which she has had for 1 year.  He has multiple children but all remain healthy.  He is currently not working, however he was working in [employer] prior to this.  He worked with a lot of [employer].  Approximately a year ago he brought home four small bunnies.    Prior to this episode of illness, he had pneumonia last fall.  His weight loss started around January when he first became ill with this episode of pneumonia with the associated lump on the right chest wall    Physical exam  Vitals:    03/04/24 0438   BP: 105/61   Pulse: 64   Resp: 16   Temp: 36 °C (96.8 °F)   SpO2: 97%       Patient is awake and alert  NAD  Neck  supple  Heart S1S2  Chest: Equal expansion, bilaterally clear to auscultation.  Chest wall lesion noted currently bandaged  Abdomen: soft, ND, NTTP, no guarding  Extrem: no pain to palpation  Skin: no rashes, no diaphoresis  Neuro: CNS intact  Affect appropriate and patient is interactive    Admission on 03/02/2024   Component Date Value Ref Range Status    WBC 03/02/2024 19.7 (H)  4.4 - 11.3 x10*3/uL Final    nRBC 03/02/2024 0.0  0.0 - 0.0 /100 WBCs Final    RBC 03/02/2024 3.57 (L)  4.50 - 5.90 x10*6/uL Final    Hemoglobin 03/02/2024 10.1 (L)  13.5 - 17.5 g/dL Final    Hematocrit 03/02/2024 30.4 (L)  41.0 - 52.0 % Final    MCV 03/02/2024 85  80 - 100 fL Final    MCH 03/02/2024 28.3  26.0 - 34.0 pg Final    MCHC 03/02/2024 33.2  32.0 - 36.0 g/dL Final    RDW 03/02/2024 15.4 (H)  11.5 - 14.5 % Final    Platelets 03/02/2024 486 (H)  150 - 450 x10*3/uL Final    Neutrophils % 03/02/2024 80.6  40.0 - 80.0 % Final    Immature Granulocytes %, Automated 03/02/2024 1.9 (H)  0.0 - 0.9 % Final    Immature Granulocyte Count (IG) includes promyelocytes, myelocytes and metamyelocytes but does not include bands. Percent differential counts (%) should be interpreted in the context of the absolute cell counts (cells/UL).    Lymphocytes % 03/02/2024 8.7  13.0 - 44.0 % Final    Monocytes % 03/02/2024 7.7  2.0 - 10.0 % Final    Eosinophils % 03/02/2024 0.6  0.0 - 6.0 % Final    Basophils % 03/02/2024 0.5  0.0 - 2.0 % Final    Neutrophils Absolute 03/02/2024 15.86 (H)  1.20 - 7.70 x10*3/uL Final    Percent differential counts (%) should be interpreted in the context of the absolute cell counts (cells/uL).    Immature Granulocytes Absolute, Au* 03/02/2024 0.38  0.00 - 0.70 x10*3/uL Final    Lymphocytes Absolute 03/02/2024 1.72  1.20 - 4.80 x10*3/uL Final    Monocytes Absolute 03/02/2024 1.52 (H)  0.10 - 1.00 x10*3/uL Final    Eosinophils Absolute 03/02/2024 0.12  0.00 - 0.70 x10*3/uL Final    Basophils Absolute 03/02/2024 0.10  0.00 -  0.10 x10*3/uL Final    Glucose 03/02/2024 115 (H)  74 - 99 mg/dL Final    Sodium 03/02/2024 134 (L)  136 - 145 mmol/L Final    Potassium 03/02/2024 3.0 (L)  3.5 - 5.3 mmol/L Final    Chloride 03/02/2024 97 (L)  98 - 107 mmol/L Final    Bicarbonate 03/02/2024 25  21 - 32 mmol/L Final    Anion Gap 03/02/2024 15  10 - 20 mmol/L Final    Urea Nitrogen 03/02/2024 20  6 - 23 mg/dL Final    Creatinine 03/02/2024 0.90  0.50 - 1.30 mg/dL Final    eGFR 03/02/2024 >90  >60 mL/min/1.73m*2 Final    Calculations of estimated GFR are performed using the 2021 CKD-EPI Study Refit equation without the race variable for the IDMS-Traceable creatinine methods.  https://jasn.asnjournals.org/content/early/2021/09/22/ASN.3958254096    Calcium 03/02/2024 9.1  8.6 - 10.3 mg/dL Final    Albumin 03/02/2024 3.4  3.4 - 5.0 g/dL Final    Alkaline Phosphatase 03/02/2024 132 (H)  33 - 120 U/L Final    Total Protein 03/02/2024 7.3  6.4 - 8.2 g/dL Final    AST 03/02/2024 5 (L)  9 - 39 U/L Final    Bilirubin, Total 03/02/2024 0.6  0.0 - 1.2 mg/dL Final    ALT 03/02/2024 4 (L)  10 - 52 U/L Final    Patients treated with Sulfasalazine may generate falsely decreased results for ALT.    Lactate 03/02/2024 0.8  0.4 - 2.0 mmol/L Final    Blood Culture 03/02/2024 No growth at 1 day   Preliminary    Blood Culture 03/02/2024 No growth at 1 day   Preliminary    Tissue/Wound Culture/Smear 03/02/2024 No growth to date   Preliminary    Gram Stain 03/02/2024 (3+) Moderate Polymorphonuclear leukocytes   Preliminary    Gram Stain 03/02/2024 (3+) Moderate Mixed Gram positive bacteria   Preliminary    MRSA PCR 03/02/2024 Not Detected  Not Detected Final    Color, Urine 03/02/2024 Yellow  Straw, Yellow Final    Appearance, Urine 03/02/2024 Clear  Clear Final    Specific Gravity, Urine 03/02/2024 >1.060 (A)  1.005 - 1.035 Final    Specific gravity of >1.060 may be falsely elevated due to interferences with measurement. If clinically indicated, repeat testing with an  alternative method is available by contacting the laboratory within 24 hours.    pH, Urine 03/02/2024 6.0  5.0, 5.5, 6.0, 6.5, 7.0, 7.5, 8.0 Final    Protein, Urine 03/02/2024 NEGATIVE  NEGATIVE mg/dL Final    Glucose, Urine 03/02/2024 NEGATIVE  NEGATIVE mg/dL Final    Blood, Urine 03/02/2024 NEGATIVE  NEGATIVE Final    Ketones, Urine 03/02/2024 NEGATIVE  NEGATIVE mg/dL Final    Bilirubin, Urine 03/02/2024 NEGATIVE  NEGATIVE Final    Urobilinogen, Urine 03/02/2024 <2.0  <2.0 mg/dL Final    Nitrite, Urine 03/02/2024 NEGATIVE  NEGATIVE Final    Leukocyte Esterase, Urine 03/02/2024 NEGATIVE  NEGATIVE Final    Extra Tube 03/02/2024 Hold for add-ons.   Final    Auto resulted.    Vancomycin 03/02/2024 19.6  5.0 - 20.0 ug/mL Final    Extra Tube 03/02/2024 Hold for add-ons.   Final    Auto resulted.    Tissue/Wound Culture/Smear 03/02/2024 No growth to date   Preliminary    Gram Stain 03/02/2024 (3+) Moderate Polymorphonuclear leukocytes   Preliminary    Gram Stain 03/02/2024 No organisms seen   Preliminary    Vancomycin 03/02/2024 10.7  5.0 - 20.0 ug/mL Final    WBC 03/03/2024 10.1  4.4 - 11.3 x10*3/uL Final    nRBC 03/03/2024 0.0  0.0 - 0.0 /100 WBCs Final    RBC 03/03/2024 3.38 (L)  4.50 - 5.90 x10*6/uL Final    Hemoglobin 03/03/2024 9.2 (L)  13.5 - 17.5 g/dL Final    Hematocrit 03/03/2024 28.8 (L)  41.0 - 52.0 % Final    MCV 03/03/2024 85  80 - 100 fL Final    MCH 03/03/2024 27.2  26.0 - 34.0 pg Final    MCHC 03/03/2024 31.9 (L)  32.0 - 36.0 g/dL Final    RDW 03/03/2024 15.4 (H)  11.5 - 14.5 % Final    Platelets 03/03/2024 424  150 - 450 x10*3/uL Final    Neutrophils % 03/03/2024 65.2  40.0 - 80.0 % Final    Immature Granulocytes %, Automated 03/03/2024 2.4 (H)  0.0 - 0.9 % Final    Immature Granulocyte Count (IG) includes promyelocytes, myelocytes and metamyelocytes but does not include bands. Percent differential counts (%) should be interpreted in the context of the absolute cell counts (cells/UL).    Lymphocytes %  03/03/2024 21.4  13.0 - 44.0 % Final    Monocytes % 03/03/2024 7.5  2.0 - 10.0 % Final    Eosinophils % 03/03/2024 2.5  0.0 - 6.0 % Final    Basophils % 03/03/2024 1.0  0.0 - 2.0 % Final    Neutrophils Absolute 03/03/2024 6.57  1.20 - 7.70 x10*3/uL Final    Percent differential counts (%) should be interpreted in the context of the absolute cell counts (cells/uL).    Immature Granulocytes Absolute, Au* 03/03/2024 0.24  0.00 - 0.70 x10*3/uL Final    Lymphocytes Absolute 03/03/2024 2.16  1.20 - 4.80 x10*3/uL Final    Monocytes Absolute 03/03/2024 0.76  0.10 - 1.00 x10*3/uL Final    Eosinophils Absolute 03/03/2024 0.25  0.00 - 0.70 x10*3/uL Final    Basophils Absolute 03/03/2024 0.10  0.00 - 0.10 x10*3/uL Final    Glucose 03/03/2024 97  74 - 99 mg/dL Final    Sodium 03/03/2024 133 (L)  136 - 145 mmol/L Final    Potassium 03/03/2024 3.1 (L)  3.5 - 5.3 mmol/L Final    Chloride 03/03/2024 96 (L)  98 - 107 mmol/L Final    Bicarbonate 03/03/2024 29  21 - 32 mmol/L Final    Anion Gap 03/03/2024 11  10 - 20 mmol/L Final    Urea Nitrogen 03/03/2024 13  6 - 23 mg/dL Final    Creatinine 03/03/2024 0.69  0.50 - 1.30 mg/dL Final    eGFR 03/03/2024 >90  >60 mL/min/1.73m*2 Final    Calculations of estimated GFR are performed using the 2021 CKD-EPI Study Refit equation without the race variable for the IDMS-Traceable creatinine methods.  https://jasn.asnjournals.org/content/early/2021/09/22/ASN.0681122650    Calcium 03/03/2024 9.2  8.6 - 10.3 mg/dL Final    Extra Tube 03/03/2024 Hold for add-ons.   Final    Auto resulted.    WBC 03/04/2024 9.4  4.4 - 11.3 x10*3/uL Final    nRBC 03/04/2024 0.0  0.0 - 0.0 /100 WBCs Final    RBC 03/04/2024 3.38 (L)  4.50 - 5.90 x10*6/uL Final    Hemoglobin 03/04/2024 9.4 (L)  13.5 - 17.5 g/dL Final    Hematocrit 03/04/2024 29.0 (L)  41.0 - 52.0 % Final    MCV 03/04/2024 86  80 - 100 fL Final    MCH 03/04/2024 27.8  26.0 - 34.0 pg Final    MCHC 03/04/2024 32.4  32.0 - 36.0 g/dL Final    RDW 03/04/2024  15.4 (H)  11.5 - 14.5 % Final    Platelets 03/04/2024 447  150 - 450 x10*3/uL Final    Neutrophils % 03/04/2024 58.6  40.0 - 80.0 % Final    Immature Granulocytes %, Automated 03/04/2024 3.6 (H)  0.0 - 0.9 % Final    Immature Granulocyte Count (IG) includes promyelocytes, myelocytes and metamyelocytes but does not include bands. Percent differential counts (%) should be interpreted in the context of the absolute cell counts (cells/UL).    Lymphocytes % 03/04/2024 26.2  13.0 - 44.0 % Final    Monocytes % 03/04/2024 6.3  2.0 - 10.0 % Final    Eosinophils % 03/04/2024 4.2  0.0 - 6.0 % Final    Basophils % 03/04/2024 1.1  0.0 - 2.0 % Final    Neutrophils Absolute 03/04/2024 5.53  1.20 - 7.70 x10*3/uL Final    Percent differential counts (%) should be interpreted in the context of the absolute cell counts (cells/uL).    Immature Granulocytes Absolute, Au* 03/04/2024 0.34  0.00 - 0.70 x10*3/uL Final    Lymphocytes Absolute 03/04/2024 2.47  1.20 - 4.80 x10*3/uL Final    Monocytes Absolute 03/04/2024 0.59  0.10 - 1.00 x10*3/uL Final    Eosinophils Absolute 03/04/2024 0.40  0.00 - 0.70 x10*3/uL Final    Basophils Absolute 03/04/2024 0.10  0.00 - 0.10 x10*3/uL Final    Glucose 03/04/2024 98  74 - 99 mg/dL Final    Sodium 03/04/2024 136  136 - 145 mmol/L Final    Potassium 03/04/2024 3.6  3.5 - 5.3 mmol/L Final    Chloride 03/04/2024 99  98 - 107 mmol/L Final    Bicarbonate 03/04/2024 29  21 - 32 mmol/L Final    Anion Gap 03/04/2024 12  10 - 20 mmol/L Final    Urea Nitrogen 03/04/2024 14  6 - 23 mg/dL Final    Creatinine 03/04/2024 0.73  0.50 - 1.30 mg/dL Final    eGFR 03/04/2024 >90  >60 mL/min/1.73m*2 Final    Calculations of estimated GFR are performed using the 2021 CKD-EPI Study Refit equation without the race variable for the IDMS-Traceable creatinine methods.  https://jasn.asnjournals.org/content/early/2021/09/22/ASN.9718206990    Calcium 03/04/2024 9.0  8.6 - 10.3 mg/dL Final    Magnesium 03/03/2024 2.11  1.60 - 2.40  mg/dL Final       Assessment:   Chest wall necrotic draining nonhealing nonsurgical wound  Liver lesions  Pulmonary nodules  Right chest wall pain    Plan:   Unfortunately, cancer is suspected for this patient.  He developed this cutaneous mass after an episode of pneumonia, therefore will workup for infectious cause.  Regarding patient's wound, surgical intervention pending tomorrow.  Case discussed with primary physician.  Will need to obtain 3 samples for pathology and microbiology.  In cases such as this, will need to try to rule out actinomyces, which can be hard to culture.  Patient is currently on vancomycin clindamycin and cefepime.  No current evidence of MRSA, discontinue vancomycin.  Can also discontinue clindamycin.  He can stay on cefepime for now with the plan of changing to ceftriaxone for the next 2 weeks.  He will need to follow-up in the office or via virtual visit for further instruction.  He may require long-term treatment and transition to oral antibiotics based on culture results.    All communication directed to consulting provider.   Thank you very much for this consultation.

## 2024-03-04 NOTE — ANESTHESIA POSTPROCEDURE EVALUATION
Patient: Ritesh Luther    Procedure Summary       Date: 03/04/24 Room / Location: [facility] OR 11 / [facility]    Anesthesia Start: 1505 Anesthesia Stop: 1619    Procedure: Operative debridement of right chest wall and abdominal wall mass (Right: Chest) Diagnosis:       Wound infection      (Wound infection [T14.8XXA, L08.9])    Surgeons: Flakito Faria MD Responsible Provider: Nav Davies MD    Anesthesia Type: general ASA Status: 2            Anesthesia Type: general    Vitals Value Taken Time   /88 03/04/24 1617   Temp 36.1 03/04/24 1620   Pulse 80 03/04/24 1618   Resp 24 03/04/24 1618   SpO2 100 % 03/04/24 1618   Vitals shown include unvalidated device data.    Anesthesia Post Evaluation    Patient location during evaluation: PACU  Patient participation: complete - patient participated  Level of consciousness: awake  Pain management: adequate  Multimodal analgesia pain management approach  Airway patency: patent  Cardiovascular status: acceptable  Respiratory status: acceptable and face mask  Hydration status: acceptable  Postoperative Nausea and Vomiting: none        No notable events documented.

## 2024-03-04 NOTE — CONSULTS
Consults    Reason For Consult  ABscess    History Of Present Illness  Ritesh Luther is a 43 y.o. male presenting with Abscess.     Past Medical History  He has a past medical history of Liver cancer (CMS/HCC) and Lung cancer (CMS/HCC).    Surgical History  He has a past surgical history that includes Hand surgery and Incision and Drainage (3/2/2024).     Social History  He reports that he has been smoking cigarettes. He has been smoking an average of .5 packs per day. He has never used smokeless tobacco. He reports current alcohol use. He reports current drug use. Drug: Marijuana.    Family History  No family history on file.     Allergies  Amoxicillin    Review of Systems     Physical Exam     Last Recorded Vitals  Blood pressure 125/81, pulse 76, temperature 36 °C (96.8 °F), temperature source Temporal, resp. rate 18, height 1.829 m (6'), weight 72.6 kg (160 lb), SpO2 100 %.    Relevant Results  NA     Assessment/Plan     Aspiration of Perihepatic collection    I spent 60 minutes in the professional and overall care of this patient.

## 2024-03-04 NOTE — PROGRESS NOTES
Ritesh Luther is a 43 y.o. male on day 2 of admission presenting with Chest wall mass.    Subjective   Pt seen in conjunction with Dr. Fritz. He is afebrile and hemodynamically stable. S/p bedside debridement R side/flank wound; scheduled for OR today for further debridement per general surgery. Consult to IR for biopsy place; awaiting confirmation that they can do. In event IR feels they are unable to obtain biopsy, will refer for O/P procedure. MRI brain pending. Will need PET as outpatient. No thoracic surgery intervention this admission. Will arrange for outpatient follow up to review biopsy and PET results. Stable for discharge from thoracic perspective once MRI and biopsy (if able to do at [facility]) completed.     Objective     Physical Exam  Vitals and nursing note reviewed.   Constitutional:       General: He is not in acute distress.     Appearance: Normal appearance. He is normal weight.   HENT:      Head: Normocephalic and atraumatic.      Right Ear: External ear normal.      Left Ear: External ear normal.      Nose: Nose normal.      Mouth/Throat:      Mouth: Mucous membranes are dry.      Pharynx: Oropharynx is clear.   Eyes:      Extraocular Movements: Extraocular movements intact.      Pupils: Pupils are equal, round, and reactive to light.   Cardiovascular:      Rate and Rhythm: Normal rate and regular rhythm.      Pulses: Normal pulses.      Heart sounds: Normal heart sounds.   Pulmonary:      Effort: Pulmonary effort is normal.      Comments: Diminished throughout but no adventitious sounds appreciated   Abdominal:      General: Abdomen is flat. Bowel sounds are normal.      Palpations: Abdomen is soft.   Musculoskeletal:         General: Normal range of motion.      Cervical back: Normal range of motion and neck supple.   Lymphadenopathy:      Cervical: No cervical adenopathy.   Skin:     General: Skin is warm and dry.      Comments: Dressing to R side open wound with scant drainage; was not removed  for exam   Neurological:      General: No focal deficit present.      Mental Status: He is alert and oriented to person, place, and time.   Psychiatric:         Mood and Affect: Mood normal.         Behavior: Behavior normal.      Comments: Very tearful          Last Recorded Vitals  Blood pressure 105/61, pulse 64, temperature 36 °C (96.8 °F), temperature source Temporal, resp. rate 16, height 1.829 m (6'), weight 72.6 kg (160 lb), SpO2 97 %.  Intake/Output last 3 Shifts:  I/O last 3 completed shifts:  In: 430 (5.9 mL/kg) [IV Piggyback:430]  Out: - (0 mL/kg)   Weight: 72.6 kg     Relevant Results  Scheduled medications  cefepime, 1 g, intravenous, q8h  lactobacillus acidophilus, 1 tablet, oral, TID  nicotine, 1 patch, transdermal, Daily      Continuous medications     PRN medications  PRN medications: acetaminophen **OR** acetaminophen **OR** acetaminophen, HYDROmorphone, oxyCODONE, polyethylene glycol    Results for orders placed or performed during the hospital encounter of 03/02/24 (from the past 24 hour(s))   CBC and Auto Differential   Result Value Ref Range    WBC 9.4 4.4 - 11.3 x10*3/uL    nRBC 0.0 0.0 - 0.0 /100 WBCs    RBC 3.38 (L) 4.50 - 5.90 x10*6/uL    Hemoglobin 9.4 (L) 13.5 - 17.5 g/dL    Hematocrit 29.0 (L) 41.0 - 52.0 %    MCV 86 80 - 100 fL    MCH 27.8 26.0 - 34.0 pg    MCHC 32.4 32.0 - 36.0 g/dL    RDW 15.4 (H) 11.5 - 14.5 %    Platelets 447 150 - 450 x10*3/uL    Neutrophils % 58.6 40.0 - 80.0 %    Immature Granulocytes %, Automated 3.6 (H) 0.0 - 0.9 %    Lymphocytes % 26.2 13.0 - 44.0 %    Monocytes % 6.3 2.0 - 10.0 %    Eosinophils % 4.2 0.0 - 6.0 %    Basophils % 1.1 0.0 - 2.0 %    Neutrophils Absolute 5.53 1.20 - 7.70 x10*3/uL    Immature Granulocytes Absolute, Automated 0.34 0.00 - 0.70 x10*3/uL    Lymphocytes Absolute 2.47 1.20 - 4.80 x10*3/uL    Monocytes Absolute 0.59 0.10 - 1.00 x10*3/uL    Eosinophils Absolute 0.40 0.00 - 0.70 x10*3/uL    Basophils Absolute 0.10 0.00 - 0.10 x10*3/uL    Basic Metabolic Panel   Result Value Ref Range    Glucose 98 74 - 99 mg/dL    Sodium 136 136 - 145 mmol/L    Potassium 3.6 3.5 - 5.3 mmol/L    Chloride 99 98 - 107 mmol/L    Bicarbonate 29 21 - 32 mmol/L    Anion Gap 12 10 - 20 mmol/L    Urea Nitrogen 14 6 - 23 mg/dL    Creatinine 0.73 0.50 - 1.30 mg/dL    eGFR >90 >60 mL/min/1.73m*2    Calcium 9.0 8.6 - 10.3 mg/dL   SST TOP   Result Value Ref Range    Extra Tube Hold for add-ons.      No results found.        Assessment/Plan   Principal Problem:    Chest wall mass    Right chest wall necrotic mass; Liver lesion; Pulmonary nodules    s/p bedside I&D (3/2)  Father passed from lung CA with mets to brain   -Seen and evaluated with Dr. Fritz; all testing reviewed  -Will need MRI brain and PET scan  -no thoracic surgery intervention at this time   -IR for lung biopsy; if unable to do while inpatient will set up for outpatient biopsy   -Antibiotics per ID; anticipating at least 2 weeks IV antibiotics   -General surgery following- plan for debridement in OR 3/4  -activity as tolerated  -patient information provided to office for scheduling of PET. Will arrange for follow up with Dr. Fritz in 3-4 weeks to review biopsy results and PET findings.   -thoracic surgery will sign off; please call back if further assistance required.     Tobacco use disorder  Encounter for cessation of tobacco use  - 1 pack/day smoker since age 14  - Nicotine patch    I spent 45 minutes in the professional and overall care of this patient.      Radha Daniels, APRN-CNP

## 2024-03-04 NOTE — PROGRESS NOTES
Update; Pt listed as uninsured. Discussed with hospitalist, Dr Villegas on rounds & SHANTELL Brink. Pt to IR today for CT guided Aspiration of Perihepatic Abscess . S/p bedside debridement R side/flank wound; scheduled for OR today for further debridement per general surgery. Received script from hospitalist for Ceftiaxone 2gm IV daily [id number]. Pt reports he has transportation available for out pt infusion. SHANTELL Henson following for update with HRS regarding Medicaid eligibility & financial assistance .

## 2024-03-04 NOTE — DOCUMENTATION CLARIFICATION NOTE
"    PATIENT:               JULIAN CHATTERJEE  ACCT #:                  [id number]  MRN:                       [id number]  :                       1980  ADMIT DATE:       3/2/2024 5:32 AM  DISCH DATE:  RESPONDING PROVIDER #:        [id number]          PROVIDER RESPONSE TEXT:    Sepsis was a differential diagnosis and ruled out after study    CDI QUERY TEXT:    [facility]_CV Sepsis    Instruction:  Based on your assessment of the patient and the clinical information, please provide the requested documentation by clicking on the appropriate radio button and enter any additional information if prompted.    Question: Sepsis was documented in the medical record. Based on the documentation and the clinical information, can the diagnosis be further clarified as    When answering this query, please exercise your independent professional judgment. The fact that a question is being asked, does not imply that any particular answer is desired or expected.    The patient's clinical indicators include:  Clinical Information: 44 yo male admitted with chest wall abscess, concern for metastatic cancer, hypokalemia, and sepsis.    Documented Diagnosis: \"Sepsis, due to unspecified organism, unspecified whether acute organ dysfunction present\" ED note 3/2 Kinsey Lowery NP    Clinical Indicators:  -Vital Signs:  (3/2 0548) Temp-36.0 HR-123 RR-18 BP-109/71 SPO2-99 RA  -WBC: 19.7 (3/2 0656), 10.1 (3/3 0638), 9.4 (3/4 0706)  -Microbiology Results: Wound culture (3/2)  (3+) Moderate Polymorphonuclear leukocytes  (3+) Moderate Mixed Gram positive bacteria  -Neutrophils: 15.86 (3/2 0656), 6.57 (3/3 0638), 5.53 (3/4 0706)  -Lactic acid: 0.8 (3/2 0656)  -BUN/Creat: 20/0.90 (3/2 0656), 13/0.69 (3/3 0638), 14/0.73 (3/4 0706)  -Blood cultures: NGTD (3/2)  -Bilirubin: 0.6 (3/2 0656)  -MAP:  89 (3/2 1930), 83 (3/3 05)  -[place] Coma Scale: 15  -Procalcitonin: n/a  -Platelets: 489 (3/2 0656), 424 (3/3 0638), 447 (3/4 16)    3/2 ED note Kinsey Lowery: " "\"Given that the patient is tachycardic, has an elevated white count concern for sepsis, started on broad-spectrum antibiotics, IV fluids.\"    3/4 PN Dr. Torres: \"Right chest wall necrotic mass (SIRS 2/4 WBC count, heart rate) s/p bedside I&D (3/2)\"    Treatment: cefepime IV 2 g q 8, vancomycin 2 g IV, Flagyl 500 mgIV, clindamycin (Cleocin) capsule 300 mg 4 x daily, vancomycin (Vancocin) 1,000 mg IV q 12    Risk Factors: SIRS  Options provided:  -- Sepsis was a differential diagnosis and ruled out after study  -- Sepsis with other organ dysfunction, Please specify sepsis associated organ dysfunction below  -- Other - I will add my own diagnosis  -- Refer to Clinical Documentation Reviewer    Query created by: Joy Cifuentes on 3/4/2024 4:43 PM      Electronically signed by:  ZACHARY TORRES MD 3/4/2024 5:26 PM          "

## 2024-03-04 NOTE — PROGRESS NOTES
Social Work Note  3/4/2024 SW reached out to HRS as patient had been admitted to [facility] over the weekend.  SW asked for patient to be screened for medicaid since patient does not have any insurance.  Kaelyn Pascualtheodore stated in email patient was screened and it would be turned into her rep.  Administrative team notified.  Catherine Dumont, MSW, LSW

## 2024-03-04 NOTE — POST-PROCEDURE NOTE
Interventional Radiology Brief Postprocedure Note    Attending: Schoenberger    Assistant: None    Diagnosis: Perihepatic Abscess    Description of procedure: CT guided Aspiration of Perihepatic Abscess     Anesthesia:  Local    Complications: None    Estimated Blood Loss: minimal    Medications  As of 03/04/24 1212      ondansetron (Zofran) injection 4 mg (mg) Total dose:  4 mg Dosing weight:  72.6      Date/Time Rate/Dose/Volume Action       03/02/24  0735 4 mg Given               HYDROmorphone (Dilaudid) injection 1 mg (mg) Total dose:  3 mg Dosing weight:  72.6      Date/Time Rate/Dose/Volume Action       03/02/24  0736 1 mg Given      1511 1 mg Given      1615 1 mg Given               HYDROmorphone (Dilaudid) injection  - Omnicell Override Pull (mg) Total dose:  1 mg      Date/Time Rate/Dose/Volume Action       03/02/24  1511 1 mg Given               cefepime (Maxipime) in dextrose 5 % water (D5W) 100 mL IV 2 g (mL/hr) Total volume:  Not documented* Dosing weight:  72.6   *Total volume has not been documented. View each administration to see the amount administered.     Date/Time Rate/Dose/Volume Action       03/02/24  0736 2 g - 200 mL/hr (over 30 min) New Bag      0806  (over 30 min) Stopped               vancomycin (Vancocin) 2 g in dextrose 5 % in water (D5W) 500 mL IV (mL/hr) Total volume:  Not documented* Dosing weight:  72.6   *Total volume has not been documented. View each administration to see the amount administered.     Date/Time Rate/Dose/Volume Action       03/02/24  0931 2 g - 270 mL/hr (over 120 min) New Bag               metroNIDAZOLE (Flagyl) 500 mg in NaCl (iso-os) 100 mL (mg) Total dose:  500 mg* Dosing weight:  72.6   *From user-documented volume     Date/Time Rate/Dose/Volume Action       03/02/24  0813 500 mg (over 60 min) New Bag      0930 100 mL Stopped               iohexol (OMNIPaque) 350 mg iodine/mL solution 75 mL (mL) Total volume:  75 mL Dosing weight:  72.6      Date/Time  Rate/Dose/Volume Action       03/02/24  0703 75 mL Given               nicotine (Nicoderm CQ) 21 mg/24 hr patch 1 patch (patch) Total dose:  3 patch Dosing weight:  72.6      Date/Time Rate/Dose/Volume Action       03/02/24  1225 1 patch (over 1440 min) Medication Applied     03/03/24  0859  (over 1440 min) Medication Removed      0900 1 patch (over 1440 min) Medication Applied     03/04/24  0915  (over 1440 min) Medication Removed      0916 1 patch (over 1440 min) Medication Applied               LORazepam (Ativan) injection 0.5 mg (mg) Total dose:  0.5 mg Dosing weight:  72.6      Date/Time Rate/Dose/Volume Action       03/02/24  1226 0.5 mg (over 5 min) Given               cefepime (Maxipime) in dextrose 5% 50 mL IV 1 g (mL/hr) Total dose:  1 g* Dosing weight:  72.6   *From user-documented volume     Date/Time Rate/Dose/Volume Action       03/02/24  1510 1 g - 100 mL/hr (over 30 min) Given      2359 1 g - 100 mL/hr (over 30 min) - 50 mL Given     03/03/24  0648 1 g - 100 mL/hr (over 30 min) Given      1554 1 g - 100 mL/hr (over 30 min) Given      2310 1 g - 100 mL/hr (over 30 min) Given     03/04/24  0635 1 g - 100 mL/hr (over 30 min) Given               clindamycin (Cleocin) capsule 300 mg (mg) Total dose:  1,500 mg* Dosing weight:  72.6   *Administration not included in total     Date/Time Rate/Dose/Volume Action       03/02/24  1345 *300 mg Missed      1510 300 mg Given      2000 300 mg Given     03/03/24  0648 300 mg Given      1317 300 mg Given      1654 300 mg Given               lactobacillus acidophilus tablet 1 tablet (tablet) Total dose:  5 tablet* Dosing weight:  72.6   *Administration not included in total     Date/Time Rate/Dose/Volume Action       03/02/24  1510 1 tablet Given      2000 1 tablet Given     03/03/24  0859 1 tablet Given      1402 1 tablet Given      2102 1 tablet Given     03/04/24  0900 *1 tablet Missed               oxyCODONE (Roxicodone) immediate release tablet 5 mg (mg) Total  dose:  25 mg Dosing weight:  72.6      Date/Time Rate/Dose/Volume Action       03/02/24  1341 5 mg Given      2000 5 mg Given     03/03/24  0648 5 mg Given      1317 5 mg Given      2102 5 mg Given               vancomycin (Vancocin) 1,000 mg in dextrose 5% water 200 mL (mL/hr) Total dose:  1,900 mg* Dosing weight:  72.6   *From user-documented volume     Date/Time Rate/Dose/Volume Action       03/02/24  2251 1,000 mg - 200 mL/hr (over 60 min) - 200 mL New Bag      2351  (over 60 min) Stopped     03/03/24  1022 1,000 mg - 200 mL/hr (over 60 min) New Bag      1122 180 mL Stopped               lidocaine (Xylocaine) 10 mg/mL (1 %) injection 200 mg (mg) Total volume:  40 mL Dosing weight:  72.6      Date/Time Rate/Dose/Volume Action       03/02/24  1517 200 mg Given      1547 200 mg Given               lidocaine (Xylocaine) injection  - Omnicell Override Pull (mg) Total dose:  400 mg      Date/Time Rate/Dose/Volume Action       03/02/24  1517 200 mg Given      1547 200 mg Given               potassium chloride CR (Klor-Con M20) ER tablet 40 mEq (mEq) Total dose:  80 mEq Dosing weight:  72.6      Date/Time Rate/Dose/Volume Action       03/03/24  0858 40 mEq Given      1402 40 mEq Given               morphine injection 2 mg (mg) Total dose:  2 mg Dosing weight:  72.6      Date/Time Rate/Dose/Volume Action       03/03/24  1022 2 mg Given               HYDROmorphone (Dilaudid) injection 0.5 mg (mg) Total dose:  0.5 mg Dosing weight:  72.6      Date/Time Rate/Dose/Volume Action       03/03/24  1708 0.5 mg Given               lidocaine PF (Xylocaine) 20 mg/mL (2 %) injection (mL) Total volume:  6 mL      Date/Time Rate/Dose/Volume Action       03/04/24  1159 6 mL Given                   Purulent Foul smelling fluid about 12 ml aspirated from the more caudal perihepatic fluid collection through 17 gauge cannula needle with CT guidance      See detailed result report with images in PACS.    The patient tolerated the procedure  well without incident or complication and is in stable condition.

## 2024-03-04 NOTE — PROGRESS NOTES
Medical Group Progress Note  ASSESSMENT & PLAN:     Right chest wall necrotic mass (SIRS 2/4 WBC count, heart rate) s/p bedside I&D (3/2)  Liver lesions  Pulmonary nodules  Concerns for metastatic cancer  - CT chest/abdomen/pelvis with IV contrast reviewed showing a large necrotic mass and loculated collection in the right lateral lower chest and abdominal wall measuring 10 x 10 x 6.5 cm and increased in size over the last 2 weeks  - Subpleural thickening and multiple spiculated pulmonary nodules in the right upper middle and lower lobe  - All of the above with concerns for metastatic malignacy vs severe infection  - s/p bedside I&D on 3/2  - 3/2 cultures from ER and bedside I&D no growth  PLAN:  - General surgery following - plan for wound washout 3/4 or 3/5  - Thoracic surgery signed off - outpatient follow-up for PET  - ID following - continuing cefepime (ceftriaxone for 14 days on discharge, may change once cultures finalize)  - Continue cefepime (vancomycin and clindamycin Dc'd 3/3)  - Status post perihepatic abscess aspiration  - Follow up MRI brain w/ & w/o contrast     Tobacco use disorder  Encounter for cessation of tobacco use  - 1 pack/day smoker since age 14  - Discussed importance of cutting down on tobacco especially if indeed cancer diagnosis     Leukocytosis, reactive to above - resolved     Hypokalemia      VTE prophylaxis: SCDs     Disposition/Daily Update: Seen by IR and underwent perihepatic abscess aspiration today.  Plan for general surgery to washout the right chest wall necrotic abscess either 3/4 or 3/5.  Midline to be placed, will continue ceftriaxone 2 g IV x 14 days via the infusion center.  MRI brain with and without contrast to be completed today for completion sake.  Anticipating discharge home in 24 hours if infusion can be arranged and workup is complete.      ---Of note, this documentation is completed using the Dragon Dictation system (voice recognition software). There may be  spelling and/or grammatical errors that were not corrected prior to final submission.---    Chuckie Villegas MD    SUBJECTIVE     Patient was seen and examined at bedside this morning.  Continues to have pain in the right chest as well as anxiety from everything going on.    Had lengthy discussions with patient multiple times through the day.  At this time he is aware that he will undergo IR guided biopsy with aspiration of any perihepatic abscess that was possible.  As well as need for IV antibiotics on discharge.  He is aware that surgery may or may not happen today depending on anesthesiology.    OBJECTIVE:     Last Recorded Vitals:  Vitals:    03/04/24 0438 03/04/24 1140 03/04/24 1202 03/04/24 1210   BP: 105/61 117/66 124/73 125/81   BP Location: Left arm      Patient Position: Lying      Pulse: 64 77 79 76   Resp: 16 20 20 18   Temp: 36 °C (96.8 °F)      TempSrc: Temporal      SpO2: 97% 100% 100% 100%   Weight:       Height:         Last I/O:  I/O last 3 completed shifts:  In: 430 (5.9 mL/kg) [IV Piggyback:430]  Out: - (0 mL/kg)   Weight: 72.6 kg     Physical Exam  Vitals reviewed.   Constitutional:       General: He is not in acute distress.     Appearance: Normal appearance. He is normal weight. He is not ill-appearing.   HENT:      Head: Normocephalic and atraumatic.   Eyes:      Extraocular Movements: Extraocular movements intact.      Conjunctiva/sclera: Conjunctivae normal.   Cardiovascular:      Rate and Rhythm: Normal rate and regular rhythm.      Pulses: Normal pulses.      Heart sounds: Normal heart sounds.   Pulmonary:      Effort: Pulmonary effort is normal.      Breath sounds: Normal breath sounds.      Comments: Breath sounds diminished to auscultation bilaterally.  Right chest wall necrotic lesion previously examined with purulent drainage.  Today purulent drainage seen on dressing however wound itself was not examined.  Abdominal:      General: Abdomen is flat. Bowel sounds are normal.       Palpations: Abdomen is soft.      Tenderness: There is abdominal tenderness. There is no guarding.      Comments: Right-sided abdominal tenderness to palpation near the necrotic mass.   Musculoskeletal:         General: No swelling or tenderness. Normal range of motion.      Cervical back: Normal range of motion and neck supple.   Neurological:      General: No focal deficit present.      Mental Status: He is alert and oriented to person, place, and time. Mental status is at baseline.   Psychiatric:         Mood and Affect: Mood normal.         Behavior: Behavior normal.     Inpatient Medications:  cefepime, 1 g, intravenous, q8h  gadoterate meglumine, 0.2 mL/kg (Order-Specific), intravenous, Once in imaging  lactobacillus acidophilus, 1 tablet, oral, TID  lidocaine, 5 mL, infiltration, Once  nicotine, 1 patch, transdermal, Daily    PRN Medications  PRN medications: acetaminophen **OR** acetaminophen **OR** acetaminophen, HYDROmorphone, hydrOXYzine HCL, oxyCODONE, polyethylene glycol  Continuous Medications:     LABS AND IMAGING:     Labs:  Results from last 7 days   Lab Units 03/04/24  0706 03/03/24  0638 03/02/24  0656   WBC AUTO x10*3/uL 9.4 10.1 19.7*   RBC AUTO x10*6/uL 3.38* 3.38* 3.57*   HEMOGLOBIN g/dL 9.4* 9.2* 10.1*   HEMATOCRIT % 29.0* 28.8* 30.4*   MCV fL 86 85 85   MCH pg 27.8 27.2 28.3   MCHC g/dL 32.4 31.9* 33.2   RDW % 15.4* 15.4* 15.4*   PLATELETS AUTO x10*3/uL 447 424 486*       Results from last 7 days   Lab Units 03/04/24  0706 03/03/24  0638 03/02/24  0656   SODIUM mmol/L 136 133* 134*   POTASSIUM mmol/L 3.6 3.1* 3.0*   CHLORIDE mmol/L 99 96* 97*   CO2 mmol/L 29 29 25   BUN mg/dL 14 13 20   CREATININE mg/dL 0.73 0.69 0.90   GLUCOSE mg/dL 98 97 115*   PROTEIN TOTAL g/dL  --   --  7.3   CALCIUM mg/dL 9.0 9.2 9.1   BILIRUBIN TOTAL mg/dL  --   --  0.6   ALK PHOS U/L  --   --  132*   AST U/L  --   --  5*   ALT U/L  --   --  4*       Imaging:  CT guided aspiration of abscess, hematoma,  cyst  Narrative: Interpreted By:  Schoenberger, Joseph,   STUDY:  CT GUIDED ASPIRATION OF ABSCESS, HEMATOMA, CYST; ;  3/4/2024 12:20 pm      INDICATION:  Signs/Symptoms:Liver or lung biopsy - probable new metastatic cancer,  need tissue sample.      COMPARISON:  None.      ACCESSION NUMBER(S):  [id number]      ORDERING CLINICIAN:  ZACHARY TORRES      CONSENT:  The procedure, its indication, its risks, and alternatives were  explained to the patient who understands and consents to the  procedure. A time-out is completed prior to the procedure to confirm  patient identity and procedure to be performed.      MODALITIES:  The CT      TECHNIQUE:  The patient was placed supine in CT scan of the upper abdomen was  performed to plan safe percutaneous access to the patient's loculated  anterolateral perihepatic fluid collection. The site was then marked.      All personnel in the procedure suite used appropriate mask and cap.  Additionally, the performing physician and assistant used maximum  barrier technique to include a sterile gown and gloves as per  standard hospital protocol. The marked site was sterilely prepped  with chlorhexidine in the usual manner. A large sterile barrier was  used to to completely draped the patient is outside of the sterilely  prepped area in the usual manner per standard hospital protocol.  Local anesthetic was administered after CT-guided placement of a 25  gauge hypodermic needle along the anticipated tract of aspiration  needle placement. Using intermittent CT guidance, a 17 gauge outer  cannula needle was advanced into the collection with CT scanning  confirming satisfactory needle positioning. Upon removal of the  needle stylette, purulent fluid relatively thick was returned.  Approximately 12 cc of this fluid was aspirated. Aspiration were  performed as much as possible to completion. The needle was placed  insert sterile specimen competence of laboratory for studies as  ordered by the  referring physician. The fluid was relatively thick,  dark tan, purulent and somewhat foul-smelling. The findings are  consistent CT appearance of abscess on the contrast-enhanced CT. At  this point the cannulation needle was removed and a follow-up CT was  performed. This confirmed decreased size of the perihepatic  collection which had a 2.2 cm transverse measurement prior to the  aspiration and a 1.0 cm transverse measurement post aspiration. The  patient tolerated this procedure well and there was no immediate  complication.      FINDINGS:  See discussion above      Impression: Successful CT-guided aspiration of the patient's right-sided  perihepatic abscess collection.          MACRO:  None      Signed by: Joseph Schoenberger 3/4/2024 12:39 PM  Dictation workstation:   [id number]

## 2024-03-04 NOTE — PROGRESS NOTES
Will follow up with patient post-op either later today or tomorrow for wound care right abdomen, post debridement.  Wound Care Progress Note     Visit Date: 3/4/2024      Patient Name: Ritesh Luther         MRN: [id number]                Reason for Visit: Initial wound care        Wound History: Unknown     Pertinent Labs:   Albumin   Date Value Ref Range Status   03/02/2024 3.4 3.4 - 5.0 g/dL Final           Wound Assessment:           NEW                        Wound 03/02/24 Other (comment) Flank Right;Upper (Active)   Date First Assessed/Time First Assessed: 03/02/24 1900   Primary Wound Type: (c) Other (comment)  Location: Flank  Wound Location Orientation: Right;Upper   Number of days: 1     Wound 03/02/24 Other (comment) Flank Right;Upper (Active)   Site Assessment Painful;Red;Yellow;Swelling 03/03/24 2100   Sujata-Wound Assessment Swelling;Painful;Red 03/03/24 2100   Margins Well-defined edges 03/03/24 2100   Drainage Description Purulent;Sanguineous 03/03/24 2100   Drainage Amount Large 03/03/24 2100   Dressing Changed Changed 03/03/24 0655   Dressing Status Clean;Dry 03/03/24 0655                   Wound Team Plan: Follow up tomorrow     Espinoza Jean-Baptiste LPN  3/4/2024  2:06 PM

## 2024-03-04 NOTE — OP NOTE
Operative debridement of right chest wall and abdominal wall mass (R) Operative Note     Date: 3/4/2024  OR Location: [facility] OR    Name: Ritesh Luther, : 1980, Age: 43 y.o., MRN: [id number], Sex: male    Diagnosis  Pre-op Diagnosis     * Wound infection [T14.8XXA, L08.9] Post-op Diagnosis     * Wound infection [T14.8XXA, L08.9]     Procedures  Operative debridement of right chest wall and abdominal wall mass  25107 - [place] DEBRIDEMENT SUBCUTANEOUS TISSUE 1ST 20 SQ CM/<      Surgeons      * Flakito Faria - Primary    Resident/Fellow/Other Assistant:  Surgeon(s) and Role:    Procedure Summary  Anesthesia: General  ASA: II  Anesthesia Staff: Anesthesiologist: Nav Davies MD  CRNA: Rose Soto, APRN-CRNA, DNAP  Estimated Blood Loss: 50mL  Intra-op Medications:   Administrations occurring from 1425 to 1555 on 24:   Medication Name Total Dose   sodium chloride 0.9 % irrigation solution 3,000 mL   BUPivacaine HCl (Marcaine) 0.25 % (2.5 mg/mL) injection 20 mL   cefepime (Maxipime) in dextrose 5% 50 mL IV 1 g Cannot be calculated   lactobacillus acidophilus tablet 1 tablet Cannot be calculated   cefepime (Maxipime) in dextrose 5% 50 mL IV 1 g 1 g              Anesthesia Record               Intraprocedure I/O Totals          Intake    LR infusion 800.00 mL    cefepime (Maxipime) in dextrose 5% 50 mL IV 1 g 50.00 mL    Total Intake 850 mL       Output    Est. Blood Loss 50 mL    Total Output 50 mL       Net    Net Volume 800 mL          Specimen:   ID Type Source Tests Collected by Time   1 : RIGHT CHEST WALL Tissue SOFT TISSUE BIOPSY SURGICAL PATHOLOGY EXAM Flakito Faria MD 3/4/2024 1545   2 : RIGHT CHEST WALL Tissue SOFT TISSUE BIOPSY SURGICAL PATHOLOGY EXAM Flakito Faria MD 3/4/2024 1545   A : RIGHT CHEST WALL FLUID Swab ABSCESS TISSUE/WOUND CULTURE/SMEAR Flakito Faria MD 3/4/2024 1540   B : RIGHT CHEST WALL FLUID Swab ABSCESS TISSUE/WOUND CULTURE/SMEAR Flakito Faria MD 3/4/2024 1541   C :  RIGHT CHEST WALL TISSUE Tissue ABSCESS TISSUE/WOUND CULTURE/SMEAR Flakito Faria MD 3/4/2024 7784        Staff:   Circulator: Geneva Barrett RN  Scrub Person: Maddie House         Drains and/or Catheters: * None in log *    Tourniquet Times:         Implants:     Findings: Right chest wall/abdominal wall wound, this was debrided and cultured     Indications: Ritesh Luther is an 43 y.o. male who is having surgery for Wound infection [T14.8XXA, L08.9].  This is a 43-year-old male who presented to [facility] with the necrotic white chest/abdominal wound.  This was incised and drained at bedside.  Because of persistent necrotic tissue in the edges and need for better culture and pathologic information, it was recommended that the patient go to the operating for more formal debridement.  Patient was explained the risk on benefits, alternatives to having this done, and he agreed to procedure.    The patient was seen in the preoperative area. The risks, benefits, complications, treatment options, non-operative alternatives, expected recovery and outcomes were discussed with the patient. The possibilities of reaction to medication, pulmonary aspiration, injury to surrounding structures, bleeding, recurrent infection, the need for additional procedures, failure to diagnose a condition, and creating a complication requiring transfusion or operation were discussed with the patient. The patient concurred with the proposed plan, giving informed consent.  The site of surgery was properly noted/marked if necessary per policy. The patient has been actively warmed in preoperative area. Preoperative antibiotics have been ordered and given within 1 hours of incision. Venous thrombosis prophylaxis have been ordered including bilateral sequential compression devices and chemical prophylaxis    Procedure Details:   Patient was brought back to operating room.  He was placed in supine position.  Huddle was  performed indicating the correct patient, procedure, laterality.  The patient underwent general anesthesia.  1 g of cefepime was given which was regularly scheduled.  C SCDs were placed and the patient working condition.  He was on DVT prophylaxis.  He was then placed in the left lateral decubitus position.  The patient was prepped and draped in usual sterile fashion.  Timeout was performed indicating the correct patient, procedure, laterality.    The wound was explored.  Several cultures were sent off both the tissue and the fluid.  This included some surgical pathology.  At this time once this was done a curette was used to curette the wound.  Some necrotic skin was also debrided using electrocautery.  This was back down to healthy bleeding edges.  The wound was then copiously irrigated using a Pulsavac.  At this time the wound was palpated and did not appear to be any necrotic or dead edges with everything appeared to be healthy and bleeding.  Hemostasis obtained using electrocautery.  Edges were then anesthetized using 20 cc of quarter percent Marcaine.  The wound was then packed using moist Curlex and an ABD on top.  Was returned then spine addition was extubated rocked covering good condition.  The count was correct.  The patient tolerated procedure well.  I was present for the entire the procedure.      Complications:  None; patient tolerated the procedure well.    Disposition: PACU - hemodynamically stable.  Condition: stable         Additional Details: None    Attending Attestation: I was present and scrubbed for the entire procedure.    Flakito Faria  Phone Number: [phone]

## 2024-03-05 ENCOUNTER — PHARMACY VISIT (OUTPATIENT)
Dept: PHARMACY | Facility: CLINIC | Age: 44
End: 2024-03-05
Payer: COMMERCIAL

## 2024-03-05 ENCOUNTER — APPOINTMENT (OUTPATIENT)
Dept: RADIOLOGY | Facility: HOSPITAL | Age: 44
End: 2024-03-05
Payer: COMMERCIAL

## 2024-03-05 VITALS
DIASTOLIC BLOOD PRESSURE: 64 MMHG | RESPIRATION RATE: 18 BRPM | BODY MASS INDEX: 21.92 KG/M2 | HEIGHT: 72 IN | HEART RATE: 61 BPM | WEIGHT: 161.82 LBS | SYSTOLIC BLOOD PRESSURE: 108 MMHG | TEMPERATURE: 97.2 F | OXYGEN SATURATION: 98 %

## 2024-03-05 LAB
ANION GAP SERPL CALC-SCNC: 11 MMOL/L (ref 10–20)
B-LACTAMASE ORGANISM ISLT: NEGATIVE
B-LACTAMASE ORGANISM ISLT: NEGATIVE
BACTERIA SPEC CULT: ABNORMAL
BACTERIA SPEC CULT: NORMAL
BASOPHILS # BLD AUTO: 0.03 X10*3/UL (ref 0–0.1)
BASOPHILS NFR BLD AUTO: 0.2 %
BUN SERPL-MCNC: 15 MG/DL (ref 6–23)
CALCIUM SERPL-MCNC: 9.1 MG/DL (ref 8.6–10.3)
CHLORIDE SERPL-SCNC: 99 MMOL/L (ref 98–107)
CO2 SERPL-SCNC: 29 MMOL/L (ref 21–32)
CREAT SERPL-MCNC: 0.67 MG/DL (ref 0.5–1.3)
EGFRCR SERPLBLD CKD-EPI 2021: >90 ML/MIN/1.73M*2
EOSINOPHIL # BLD AUTO: 0.01 X10*3/UL (ref 0–0.7)
EOSINOPHIL NFR BLD AUTO: 0.1 %
ERYTHROCYTE [DISTWIDTH] IN BLOOD BY AUTOMATED COUNT: 15.2 % (ref 11.5–14.5)
GLUCOSE SERPL-MCNC: 120 MG/DL (ref 74–99)
GRAM STN SPEC: ABNORMAL
GRAM STN SPEC: ABNORMAL
GRAM STN SPEC: NORMAL
GRAM STN SPEC: NORMAL
HCT VFR BLD AUTO: 28.5 % (ref 41–52)
HGB BLD-MCNC: 9.1 G/DL (ref 13.5–17.5)
HIV 1+2 AB+HIV1 P24 AG SERPL QL IA: NONREACTIVE
HOLD SPECIMEN: NORMAL
IMM GRANULOCYTES # BLD AUTO: 0.45 X10*3/UL (ref 0–0.7)
IMM GRANULOCYTES NFR BLD AUTO: 3 % (ref 0–0.9)
LYMPHOCYTES # BLD AUTO: 2.02 X10*3/UL (ref 1.2–4.8)
LYMPHOCYTES NFR BLD AUTO: 13.5 %
MCH RBC QN AUTO: 27.5 PG (ref 26–34)
MCHC RBC AUTO-ENTMCNC: 31.9 G/DL (ref 32–36)
MCV RBC AUTO: 86 FL (ref 80–100)
MONOCYTES # BLD AUTO: 0.46 X10*3/UL (ref 0.1–1)
MONOCYTES NFR BLD AUTO: 3.1 %
NEUTROPHILS # BLD AUTO: 12 X10*3/UL (ref 1.2–7.7)
NEUTROPHILS NFR BLD AUTO: 80.1 %
NRBC BLD-RTO: 0 /100 WBCS (ref 0–0)
PLATELET # BLD AUTO: 490 X10*3/UL (ref 150–450)
POTASSIUM SERPL-SCNC: 4.5 MMOL/L (ref 3.5–5.3)
RBC # BLD AUTO: 3.31 X10*6/UL (ref 4.5–5.9)
SODIUM SERPL-SCNC: 134 MMOL/L (ref 136–145)
WBC # BLD AUTO: 15 X10*3/UL (ref 4.4–11.3)

## 2024-03-05 PROCEDURE — 36415 COLL VENOUS BLD VENIPUNCTURE: CPT | Performed by: REGISTERED NURSE

## 2024-03-05 PROCEDURE — RXMED WILLOW AMBULATORY MEDICATION CHARGE

## 2024-03-05 PROCEDURE — 2500000002 HC RX 250 W HCPCS SELF ADMINISTERED DRUGS (ALT 637 FOR MEDICARE OP, ALT 636 FOR OP/ED): Performed by: REGISTERED NURSE

## 2024-03-05 PROCEDURE — 2500000004 HC RX 250 GENERAL PHARMACY W/ HCPCS (ALT 636 FOR OP/ED): Performed by: HOSPITALIST

## 2024-03-05 PROCEDURE — 2780000003 HC OR 278 NO HCPCS

## 2024-03-05 PROCEDURE — 2500000001 HC RX 250 WO HCPCS SELF ADMINISTERED DRUGS (ALT 637 FOR MEDICARE OP): Performed by: REGISTERED NURSE

## 2024-03-05 PROCEDURE — 99239 HOSP IP/OBS DSCHRG MGMT >30: CPT | Performed by: HOSPITALIST

## 2024-03-05 PROCEDURE — 80048 BASIC METABOLIC PNL TOTAL CA: CPT | Performed by: REGISTERED NURSE

## 2024-03-05 PROCEDURE — 2500000004 HC RX 250 GENERAL PHARMACY W/ HCPCS (ALT 636 FOR OP/ED): Performed by: REGISTERED NURSE

## 2024-03-05 PROCEDURE — C1751 CATH, INF, PER/CENT/MIDLINE: HCPCS

## 2024-03-05 PROCEDURE — S4991 NICOTINE PATCH NONLEGEND: HCPCS | Performed by: REGISTERED NURSE

## 2024-03-05 PROCEDURE — 85025 COMPLETE CBC W/AUTO DIFF WBC: CPT | Performed by: REGISTERED NURSE

## 2024-03-05 PROCEDURE — 87389 HIV-1 AG W/HIV-1&-2 AB AG IA: CPT | Mod: ELYLAB | Performed by: HOSPITALIST

## 2024-03-05 PROCEDURE — 99233 SBSQ HOSP IP/OBS HIGH 50: CPT

## 2024-03-05 PROCEDURE — 36410 VNPNXR 3YR/> PHY/QHP DX/THER: CPT

## 2024-03-05 RX ORDER — L. ACIDOPHILUS/L.BULGARICUS 1MM CELL
1 TABLET ORAL 3 TIMES DAILY
Qty: 90 TABLET | Refills: 0 | Status: SHIPPED | OUTPATIENT
Start: 2024-03-05 | End: 2024-04-12

## 2024-03-05 RX ORDER — OXYCODONE AND ACETAMINOPHEN 5; 325 MG/1; MG/1
1 TABLET ORAL EVERY 6 HOURS PRN
Qty: 12 TABLET | Refills: 0 | Status: SHIPPED | OUTPATIENT
Start: 2024-03-05 | End: 2024-03-22 | Stop reason: ALTCHOICE

## 2024-03-05 RX ORDER — IBUPROFEN 200 MG
1 TABLET ORAL DAILY
Qty: 30 PATCH | Refills: 0 | Status: SHIPPED | OUTPATIENT
Start: 2024-03-06 | End: 2024-03-22 | Stop reason: ALTCHOICE

## 2024-03-05 RX ADMIN — NICOTINE 1 PATCH: 21 PATCH, EXTENDED RELEASE TRANSDERMAL at 08:51

## 2024-03-05 RX ADMIN — DEXTROSE MONOHYDRATE 2 G: 5 INJECTION INTRAVENOUS at 13:59

## 2024-03-05 RX ADMIN — CEFEPIME 1 G: 1 INJECTION, POWDER, FOR SOLUTION INTRAMUSCULAR; INTRAVENOUS at 06:12

## 2024-03-05 RX ADMIN — OXYCODONE HYDROCHLORIDE 5 MG: 5 TABLET ORAL at 09:04

## 2024-03-05 RX ADMIN — Medication 1 TABLET: at 08:49

## 2024-03-05 SDOH — SOCIAL STABILITY: SOCIAL NETWORK

## 2024-03-05 ASSESSMENT — COGNITIVE AND FUNCTIONAL STATUS - GENERAL
DAILY ACTIVITIY SCORE: 24
MOBILITY SCORE: 24

## 2024-03-05 ASSESSMENT — PAIN SCALES - GENERAL
PAINLEVEL_OUTOF10: 4
PAINLEVEL_OUTOF10: 3

## 2024-03-05 ASSESSMENT — PAIN - FUNCTIONAL ASSESSMENT
PAIN_FUNCTIONAL_ASSESSMENT: 0-10
PAIN_FUNCTIONAL_ASSESSMENT: 0-10

## 2024-03-05 ASSESSMENT — PAIN DESCRIPTION - ORIENTATION: ORIENTATION: RIGHT

## 2024-03-05 NOTE — PROGRESS NOTES
03/05/24 0902   Discharge Planning   Living Arrangements Spouse/significant other;Children   Support Systems Spouse/significant other;Family members   Type of Residence Private residence   Who is requesting discharge planning? Provider   Home or Post Acute Services Other (Comment)  (out pt infusion)   Does the patient need discharge transport arranged? No   Patient Choice   Provider Choice list and CMS website (https://medicare.gov/care-compare#search) for post-acute Quality and Resource Measure Data were provided and reviewed with: Patient     Met with pt & nurse Geneva in room. Discussed out pt infusion. Pt voiced understanding. Pt to have midline placed & nurse to give dose of Ceftrixone prior to discharge. Spoke with Anh from out pt infusion center. Scheduled to start 2pm tomorrow at infusion center. Notified Anh pt is Medicaid pending working with HRS. Pt very concerned about his children & his wife losing her job & reports he needs to dc today. Faxed demographic sheet & script to [facility] #[phone].   925am Notified pt nurse Geneva & pt that sched for 2pm @ infusion center starting on 3/6. Pt in agreement, voiced understanding.

## 2024-03-05 NOTE — DISCHARGE SUMMARY
DISCHARGE DIAGNOSIS     Right chest wall necrotic mass with sepsis status post bedside I&D  History of liver lesions status post washout  Pulmonary nodules  Tobacco dependence  Hypokalemia  Leukocytosis, reactive    HOSPITAL COURSE AND DETAILS       Mr. Luther is a 43-year-old male with past medical history of tobacco dependence who presented to the emergency room with right chest wall wound that was draining, fevers and chills.    He was treated in December 2023 for multifocal pneumonia, since then he has had frequent emergency visits for worsening chest wall pain.      In the emergency room, his CT chest abdomen and pelvis with IV contrast showed a large necrotic mass and loculated collection in the right chest wall and abdominal wall measuring 10 x 10 x 6.5 cm which has increased over the last 2 weeks.  He was also noted to have subpleural thickening in multiple spiculated pulmonary nodules in the right middle and lower lobe.  All of these findings were concerning for malignancy and severe infection.    He was empirically started on vancomycin, clindamycin and cefepime IV.    He had a bedside I&D on March 2, he was seen by general surgery, underwent operative debridement of right chest wall and abdominal mass.  Subsequently, underwent CT-guided aspiration of perihepatic abscess.    His cultures were growing rare gram-positive cocci and mixed gram-positive bacteria, infectious disease advised continuation of ceftriaxone 2 g IV for 2 weeks.      He was also seen by CT surgery, he will have an outpatient follow-up for an MRI and a PET scan.    Prior to his discharge, with patient's permission, an HIV screen was added to his lab work.    He will be discharged with antibiotics, and a referral to healthy at home.    He does have a history of tobacco dependence, he was given a prescription for nicotine patch.    All of his medications were delivered at bedside prior to his discharge.      Total time spent on  discharge planning and coordination of care 40 minutes.  Discharge planning was discussed with patient, she understands and agrees with plan of care.    * Some of these notes were completed using Dragon voice recognition technology and may include unintended areas with respect to translation of word, typographical errors or primary areas which may not have been identified prior to finalization of the document.          DISCHARGE PHYSICAL EXAM     Last Recorded Vitals:  Vitals:    03/04/24 2103 03/04/24 2300 03/05/24 0436 03/05/24 0549   BP: 122/73 119/68 106/55    BP Location: Right arm Right arm Right arm    Patient Position: Lying Lying Lying    Pulse: 73 78 54    Resp: 18 17 18    Temp: 36.1 °C (97 °F) 36.8 °C (98.2 °F) 35.7 °C (96.3 °F)    TempSrc: Temporal Temporal Temporal    SpO2: 96% 98% 96%    Weight:    73.4 kg (161 lb 13.1 oz)   Height:           Physical Exam  PHYSICAL EXAM:   GENERAL: Laying in bed, does not appear to be in any distress.   HEENT: HEAD: Normocephalic atraumatic.  Neck: Supple.  Eyes: Pupils are reactive to direct light.   CVS: S1, S2 heard. Regular rate and rhythm.  Right chest wall mass concealed with dressing.  LUNGS: Clear to auscultate bilaterally. No wheezing or rhonchi appreciated.  ABDOMEN: Soft, nontender to palpate. Positive bowel sounds. No guarding or rebound appreciated.  NEUROLOGICAL: No focal neurological deficits appreciated. Cranial nerves are grossly intact.  EXTREMITIES: Dorsalis pedis pulses can be appreciated. No edema appreciated.  SKIN:  Grossly intact, warm and dry.    DISCHARGE MEDICATIONS        Your medication list        ASK your doctor about these medications        Instructions Last Dose Given Next Dose Due   acetaminophen 500 mg tablet  Commonly known as: Tylenol      Take 2 tablets (1,000 mg) by mouth 3 times a day for 10 days.       lidocaine HCL 4 % adhesive patch,medicated      Apply 1 patch topically once daily for 10 days. Place on area of pain.        naproxen 250 mg tablet  Commonly known as: Naprosyn      Take 1 tablet (250 mg) by mouth 2 times a day with meals for 10 days.       oxyCODONE 5 mg immediate release tablet  Commonly known as: Roxicodone  Ask about: Should I take this medication?      Take 1 tablet (5 mg) by mouth every 6 hours if needed for severe pain (7 - 10) for up to 3 days.       oxyCODONE-acetaminophen 5-325 mg tablet  Commonly known as: Percocet  Ask about: Should I take this medication?      Take 1 tablet by mouth every 6 hours if needed for severe pain (7 - 10) for up to 3 days. Do not start before February 22, 2024.                  OUTPATIENT FOLLOW-UP     Future Appointments   Date Time Provider Department Center   3/6/2024  2:00 PM INF 04 [facility]   3/7/2024  2:00 PM INF 06 [facility]   3/8/2024  2:00 PM [facility]F 06 [facility]   3/11/2024  8:15 AM Roland J Reyes, MD [id number]   3/11/2024  2:00 PM INF 03 [facility]   3/12/2024  2:00 PM INF 04 [facility]   3/13/2024  2:00 PM INF 03 [facility]   3/14/2024  2:00 PM INF 01 [facility]   3/15/2024  2:00 PM INF 02 [facility]   3/21/2024  8:45 AM [facility] PET CT [facility]   3/21/2024  9:45 AM [facility] PET CT [facility]   3/22/2024 11:30 AM Ileana Fritz MD [id number]

## 2024-03-05 NOTE — CARE PLAN
The patient's goals for the shift include Labs WNL    The clinical goals for the shift include Labs WNL      Problem: Skin  Goal: Decreased wound size/increased tissue granulation at next dressing change  3/5/2024 0429 by Rachel Diamond RN  Outcome: Progressing  3/4/2024 2138 by Rachel Diamond, RN  Flowsheets (Taken 3/4/2024 2138)  Decreased wound size/increased tissue granulation at next dressing change:   Utilize specialty bed per algorithm   Promote sleep for wound healing   Protective dressings over bony prominences     Problem: Respiratory  Goal: Clear secretions with interventions this shift  Outcome: Progressing  Goal: Minimize anxiety/maximize coping throughout shift  Outcome: Progressing  Goal: Minimal/no exertional discomfort or dyspnea this shift  Outcome: Progressing     Problem: Nutrition  Goal: Consume prescribed supplement  Outcome: Progressing  Goal: Adequate PO fluid intake  Outcome: Progressing  Goal: Lab values WNL  Outcome: Progressing  Goal: Electrolytes WNL  Outcome: Progressing  Goal: Promote healing  Outcome: Progressing  Goal: Maintain stable weight  Outcome: Progressing     Problem: Discharge Planning  Goal: Discharge to home or other facility with appropriate resources  Outcome: Progressing     Problem: Chronic Conditions and Co-morbidities  Goal: Patient's chronic conditions and co-morbidity symptoms are monitored and maintained or improved  Outcome: Progressing     Problem: Fall/Injury  Goal: Verbalize understanding of personal risk factors for fall in the hospital  Outcome: Progressing

## 2024-03-05 NOTE — DISCHARGE INSTRUCTIONS
It was nice caring for you during your stay at [facility]. As we discussed,  -Patient has a follow up wound care appointment at [facility] which has already been scheduled for Monday March 11th at 8:15 am with Dr. Reyes. Please arrive 30 minutes early for registration. If you can not make this appointment please call [phone] to reschedule.  -Please continue your ceftriaxone IV daily at the infusion center.  Please continue your probiotic  -You have been prescribed oxycodone as needed for pain.  Please use this sparingly as it can cause addiction and constipation.  You have been also prescribed intranasal Narcan.  -Please follow-up with your primary care doctor.    Wishing you a healthy recovery!

## 2024-03-05 NOTE — PROGRESS NOTES
03/05/24 1600   Referral To   Public Assistance Pending Number [id number]   Community Resources   ([employer], [facility], [facility])     Pt provided with pending Medicaid number. Medications sent to [facility],  covered the cost of meds due to large out of pocket cost and inability for pt to afford.

## 2024-03-05 NOTE — CONSULTS
Nutrition Progress Note    Wife and children at bedside during time of RD visit. Pt reported good intakes PTA and currently. Dislikes hospital food, outside food on bedside table. Reports wt loss d/t recent pneumonia. Is being worked up for suspected liver and lung cancer. Is interested in trying Ensure/Boost in outpatient setting though not interested in receiving supplements while admitted. Declined full nutritional assessment at this time, please re-consult RD as needed.

## 2024-03-05 NOTE — CARE PLAN
Problem: Skin  Goal: Decreased wound size/increased tissue granulation at next dressing change  Outcome: Met     Problem: Respiratory  Goal: Clear secretions with interventions this shift  Outcome: Met  Goal: Minimize anxiety/maximize coping throughout shift  Outcome: Met  Goal: Minimal/no exertional discomfort or dyspnea this shift  Outcome: Met     Problem: Nutrition  Goal: Consume prescribed supplement  Outcome: Met  Goal: Adequate PO fluid intake  Outcome: Met  Goal: Lab values WNL  Outcome: Met  Goal: Electrolytes WNL  Outcome: Met  Goal: Promote healing  Outcome: Met  Goal: Maintain stable weight  Outcome: Met     Problem: Discharge Planning  Goal: Discharge to home or other facility with appropriate resources  Outcome: Met     Problem: Chronic Conditions and Co-morbidities  Goal: Patient's chronic conditions and co-morbidity symptoms are monitored and maintained or improved  Outcome: Met     Problem: Fall/Injury  Goal: Verbalize understanding of personal risk factors for fall in the hospital  Outcome: Met   The patient's goals for the shift include Labs WNL    The clinical goals for the shift include Patient will be hemodynamically stable throughout shift.

## 2024-03-05 NOTE — PROGRESS NOTES
Removed dressing and packing from right chest wound. Irrigated wound with Vashe Wound measurements are 6.0cm x 5.0cm x 0.8cm Wound bed is red and there is no drainage seen. Wound edges are beefy red and boarder margins well defined. Applied Vashe wet to dry 4x4 x 3 and secured with Mepilex dressing. Wife and patient were taught through demonstration all aspects of dressing change and wound care. Both were very receptive. Extra supplies were given to patient and family. There is a follow up appointment to see Dr. Reyes at wound care center on Monday.  Wound Care Progress Note     Visit Date: 3/5/2024      Patient Name: Ritesh Luther         MRN: [id number]                Reason for Visit: Wound Care Left Chest        Wound History: 2-3 weeks/debridement x 1 day     Pertinent Labs:   Albumin   Date Value Ref Range Status   03/02/2024 3.4 3.4 - 5.0 g/dL Final           Wound Assessment:           NEW                        Wound 03/02/24 Other (comment) Flank Right;Upper (Active)   Date First Assessed/Time First Assessed: 03/02/24 1900   Primary Wound Type: (c) Other (comment)  Location: Flank  Wound Location Orientation: Right;Upper   Number of days: 2       Wound 03/04/24 Incision Flank Right (Active)   Date First Assessed: 03/04/24   Present on Original Admission: Yes  Hand Hygiene Completed: Yes  Primary Wound Type: Incision  Location: (c) Flank  Wound Location Orientation: Right   Number of days: 1     Wound 03/02/24 Other (comment) Flank Right;Upper (Active)   Site Assessment Clean;Dry 03/04/24 2100   Sujata-Wound Assessment Clean;Dry 03/04/24 2100   Margins Well-defined edges 03/03/24 2100   Drainage Description None 03/04/24 2100   Drainage Amount None 03/04/24 2100   Dressing ABD 03/04/24 2100   Dressing Changed Reinforced 03/04/24 2100   Dressing Status Clean;Dry 03/04/24 2100       Wound 03/04/24 Incision Flank Right (Active)   Site Assessment Clean;Dry 03/04/24 2100   Sujata-Wound Assessment Clean;Dry 03/04/24  2100   Sutures/Staple Line Approximated 03/04/24 1650   Drainage Description None 03/04/24 2100   Drainage Amount None 03/04/24 2100   Dressing ABD 03/04/24 2100   Dressing Changed Reinforced 03/04/24 2100   Dressing Status Clean;Dry 03/04/24 1650                   Wound Team Plan: Continue with current dressing changes until seen by Dr. Reyes this Monday.     Espinoza Jean-Baptiste LPN  3/5/2024  12:20 PM

## 2024-03-05 NOTE — PROGRESS NOTES
"General Surgery Progress Note    Patient: Ritesh Luther  Unit/Bed: 1119/1119-A  YOB: 1980  MRN: [id number]  Acct: [id number]   Admitting Diagnosis: Wound infection [T14.8XXA, L08.9]  Chest wall mass [R22.2]  Sepsis, due to unspecified organism, unspecified whether acute organ dysfunction present (CMS/[facility]) [A41.9]  Date:  3/2/2024  Hospital Day: 3  Attending: Althea Obando*    Complaint:  Chief Complaint   Patient presents with    Abscess     Pt seen yesterday in ED, diagnosed with abscess on right side, pt states has since \"burst open\"      Subjective  Patient seen and examined this morning. No acute events overnight. Patient denies nausea and vomiting. Denies abdominal pain.  Denies chills.  Says that he is feeling remarkably better compared to the past few days.  Reports that his pain is manageable and he is tolerating his diet well.  He is very anxious and adamant about getting home today due to his children and wife.  Patient understands his need for antibiotics and reports that he will make sure that he gets to the infusion center each day.     PHYSICAL EXAM:  Physical Exam  Vitals and nursing note reviewed.   Constitutional:       General: He is not in acute distress.     Appearance: Normal appearance. He is normal weight.   HENT:      Head: Normocephalic.      Right Ear: External ear normal.      Left Ear: External ear normal.      Nose: Nose normal.      Mouth/Throat:      Mouth: Mucous membranes are moist.      Pharynx: Oropharynx is clear.   Eyes:      Extraocular Movements: Extraocular movements intact.      Pupils: Pupils are equal, round, and reactive to light.   Cardiovascular:      Rate and Rhythm: Normal rate and regular rhythm.      Pulses: Normal pulses.      Heart sounds: Normal heart sounds.   Pulmonary:      Effort: Pulmonary effort is normal.      Breath sounds: Normal breath sounds.   Chest:      Comments: Postop debridement of right chest wall mass.  Dressing " CDI  Abdominal:      General: Abdomen is flat. Bowel sounds are normal.      Palpations: Abdomen is soft.   Musculoskeletal:         General: Normal range of motion.      Cervical back: Normal range of motion.   Skin:     General: Skin is warm and dry.      Capillary Refill: Capillary refill takes less than 2 seconds.   Neurological:      General: No focal deficit present.      Mental Status: He is alert and oriented to person, place, and time. Mental status is at baseline.   Psychiatric:         Mood and Affect: Mood normal.       Vital signs in last 24 hours:  Vitals:    03/05/24 0436   BP: 106/55   Pulse: 54   Resp: 18   Temp: 35.7 °C (96.3 °F)   SpO2: 96%     Intake/Output this shift:    Intake/Output Summary (Last 24 hours) at 3/5/2024 1125  Last data filed at 3/5/2024 0646  Gross per 24 hour   Intake 1670 ml   Output 450 ml   Net 1220 ml        Allergies:  Allergies   Allergen Reactions    Amoxicillin Other     shakiness      Medications:  Scheduled medications  cefepime, 1 g, intravenous, q8h  cefTRIAXone, 2 g, intravenous, q24h  lactobacillus acidophilus, 1 tablet, oral, TID  nicotine, 1 patch, transdermal, Daily      Continuous medications     PRN medications  PRN medications: acetaminophen **OR** acetaminophen **OR** acetaminophen, HYDROmorphone, hydrOXYzine HCL, melatonin, oxyCODONE, oxyCODONE, polyethylene glycol, promethazine (Phenergan) 6.25 mg in sodium chloride 0.9% 50 mL IV  Labs:  Results for orders placed or performed during the hospital encounter of 03/02/24 (from the past 24 hour(s))   Tissue/Wound Culture/Smear    Specimen: Skin/Superficial Abscess; Tissue/Biopsy   Result Value Ref Range    Tissue/Wound Culture/Smear No growth to date     Gram Stain (3+) Moderate Polymorphonuclear leukocytes (A)     Gram Stain (1+) Rare Gram positive cocci (A)    Tissue/Wound Culture/Smear    Specimen: ABSCESS; Tissue   Result Value Ref Range    Tissue/Wound Culture/Smear No growth to date     Gram Stain (3+)  Moderate Polymorphonuclear leukocytes     Gram Stain No organisms seen    CBC and Auto Differential   Result Value Ref Range    WBC 15.0 (H) 4.4 - 11.3 x10*3/uL    nRBC 0.0 0.0 - 0.0 /100 WBCs    RBC 3.31 (L) 4.50 - 5.90 x10*6/uL    Hemoglobin 9.1 (L) 13.5 - 17.5 g/dL    Hematocrit 28.5 (L) 41.0 - 52.0 %    MCV 86 80 - 100 fL    MCH 27.5 26.0 - 34.0 pg    MCHC 31.9 (L) 32.0 - 36.0 g/dL    RDW 15.2 (H) 11.5 - 14.5 %    Platelets 490 (H) 150 - 450 x10*3/uL    Neutrophils % 80.1 40.0 - 80.0 %    Immature Granulocytes %, Automated 3.0 (H) 0.0 - 0.9 %    Lymphocytes % 13.5 13.0 - 44.0 %    Monocytes % 3.1 2.0 - 10.0 %    Eosinophils % 0.1 0.0 - 6.0 %    Basophils % 0.2 0.0 - 2.0 %    Neutrophils Absolute 12.00 (H) 1.20 - 7.70 x10*3/uL    Immature Granulocytes Absolute, Automated 0.45 0.00 - 0.70 x10*3/uL    Lymphocytes Absolute 2.02 1.20 - 4.80 x10*3/uL    Monocytes Absolute 0.46 0.10 - 1.00 x10*3/uL    Eosinophils Absolute 0.01 0.00 - 0.70 x10*3/uL    Basophils Absolute 0.03 0.00 - 0.10 x10*3/uL   Basic Metabolic Panel   Result Value Ref Range    Glucose 120 (H) 74 - 99 mg/dL    Sodium 134 (L) 136 - 145 mmol/L    Potassium 4.5 3.5 - 5.3 mmol/L    Chloride 99 98 - 107 mmol/L    Bicarbonate 29 21 - 32 mmol/L    Anion Gap 11 10 - 20 mmol/L    Urea Nitrogen 15 6 - 23 mg/dL    Creatinine 0.67 0.50 - 1.30 mg/dL    eGFR >90 >60 mL/min/1.73m*2    Calcium 9.1 8.6 - 10.3 mg/dL   SST TOP   Result Value Ref Range    Extra Tube Hold for add-ons.       Imaging:  CT chest abdomen pelvis w IV contrast    Result Date: 3/2/2024  Interpreted By:  Nohelia Gonzalez, STUDY: CT CHEST ABDOMEN PELVIS W IV CONTRAST;  3/2/2024 7:02 am   INDICATION: Signs/Symptoms:mass/abscess.   COMPARISON: CT chest abdomen pelvis dated 02/18/2024   ACCESSION NUMBER(S): [id number]   ORDERING CLINICIAN: ALLISON KEMP   TECHNIQUE: CT of the chest, abdomen, and pelvis was performed after administration of intravenous contrast. Contiguous axial images were obtained  at 3 mm slice thickness through the chest, abdomen and pelvis. Coronal and sagittal reconstructions at 3 mm slice thickness were performed. 75 mL ml of contrast 250 mg iohexol were administered intravenously without immediate complication.   FINDINGS: CHEST:   LUNG/PLEURA/LARGE AIRWAYS: There is persistent evidence of pleural thickening with reticular subpleural airspace opacity with traction bronchiectasis in the anterior aspect of right middle lobe, slightly less pronounced on today's examination compared to immediate prior imaging with persistent evidence of moderate residual. There is also focal subpleural nodular airspace opacity in the lateral aspect of the right middle lobe, less pronounced on today's imaging compared to prior. There is persistent evidence of focal pleural thickening along the anterior aspect of the right upper lobe, similar compared to immediate prior CT examination. There is also a subpleural airspace opacity along the medial aspect of the right lower lobe measuring approximately 1 cm and is similar compared to immediate prior imaging. There are mild centrilobular emphysematous changes in bilateral lungs. Left lung is relatively clear.       VESSELS: Thoracic aorta is normal in course and caliber. Pulmonary artery is within normal limits for size.   HEART: Heart is normal in size. No pericardial effusion.   MEDIASTINUM AND YUVAL: There are mildly prominent mediastinal lymph nodes, similar compared to prior CT examination. Few calcified bilateral perihilar lymph nodes are noted. There are mildly prominent enlarged right axillary lymph nodes with a prominent lymph node measuring up to 1 cm. These are similar compared to prior CT examination. Thoracic esophagus appears grossly unremarkable.   CHEST WALL AND LOWER NECK: There is persistent evidence of a loculated fluid collection/necrotic mass extending along the right lateral chest wall soft tissues. There has been significant interval increase  in the size of right chest wall mass extending into the thoracic cavity and into the abdominal cavity with multiple loculations and associated indentation on the right lobe of liver. This mass measures approximately 10 x 10 x 6.5 cm compared 8 x 7 x 3 cm on prior CT examination. There is also mild interval increase in intra-abdominal extension of this mass causing increased indentation of the right lobe of liver compared to prior CT examination. However there is interval increase in subdiaphragmatic extension of this mass measuring approximately 3.5 x 3 x 2.5 cm.       ABDOMEN:   LIVER: There is persistent evidence of indentation along the peripheral aspect of the right lobe of liver secondary to intra-abdominal extension of the chest wall mass, slightly increased compared to prior CT examination. Otherwise liver is unremarkable without intrahepatic mass lesions.   BILE DUCTS: Intrahepatic or extrahepatic biliary ductal dilatation.   GALLBLADDER: Gallbladder is moderately distended and does not demonstrate calcified stones in the lumen.   PANCREAS: The pancreas appears unremarkable without evidence of ductal dilatation or masses.   SPLEEN: Spleen demonstrates multiple focal calcifications, suggestive of granulomatous disease.   ADRENAL GLANDS: Bilateral adrenal glands are unremarkable.   KIDNEYS AND URETERS: Bilateral kidneys enhance symmetrically. Hydronephrosis.   PELVIS:   BLADDER: Urinary bladder is not well distended and is limited in evaluation.   REPRODUCTIVE ORGANS: Prostate gland is unremarkable.   BOWEL: Stomach is not well distended limiting evaluation. Small bowel loops are normal in course and caliber and does not demonstrate segmental distention to suggest obstruction. Large bowel demonstrates moderate stool volume and appears grossly unremarkable. Appendix is visualized and appears unremarkable.     VESSELS: Mild atherosclerotic calcifications of the abdominal aorta are noted.    PERITONEUM/RETROPERITONEUM/LYMPH NODES: There is no free or loculated fluid collection, no free intraperitoneal air. The retroperitoneum appears normal.  No evidence of enlarged lymphadenopathy in the abdomen and pelvis.   BONE AND SOFT TISSUE: No suspicious osseous lesions. The right thoracic rib cage that is in close proximity to the aforementioned mass does not demonstrate osseous erosions or intramedullary involvement. Abdominal wall soft tissues otherwise appear grossly unremarkable except for the mass mentioned above.       CHEST: 1.  Large necrotic mass/loculated collection in the right lateral lower chest/abdominal wall extending into the thoracic and abdominal cavity demonstrating significant interval increase in size compared to prior CT examination dated 02/18/2024. This measures approximately 10 x 10 x 6.5 cm compared to 8 x 7 x 3 cm on immediate prior CT examination dated 02/18/2024. There is also interval increase in intra-abdominal extension of this mass causing increased indentation on the right lobe of liver compared to prior CT examination as described above. 2. There is persistent evidence of subpleural thickening and reticulation in the anterior aspect of the right middle lobe, similar to slightly improved compared to prior CT examination. 3. Redemonstration of multiple spiculated pulmonary nodules in the right upper lobe, right middle lobe and right lower lobe as described above, similar compared to immediate prior CT examination. Recommend continued attention on follow-up imaging.   ABDOMEN-PELVIS: 1.  No acute abnormality in the abdomen and pelvis.     MACRO: None   Signed by: Nohelia Gonzalez 3/2/2024 8:29 AM Dictation workstation:   [id number]     Assessment  POD 3 I&D Right chest wall necrotic abscess and POD1 debridement.     Patient reports tenderness to surgical site from yesterday.  Dressings CDI.  Patient denies nausea, vomiting, or chills.  He is mostly anxious about getting home  today. Tolerating his diet and reports that pain is well managed.  According to the , Nancy, infusions are set to start tomorrow afternoon.     Plan  -Regular diet  -Pain control  -Nausea control  -IV antibiotics  -ID following and recommends ceftriaxone for 14 days upon discharge, but this may change once cultures finalize.  -Wound care to see today to discuss dressing changes.  -DVT prophylaxis-per primary team  -Pulmonary toileting   -Encourage ambulation  -Continue care per primary team       Further recommendations per Dr. Bienvenido William PA-C    Time spent  37  minutes obtaining labs, imaging, recommendations, interview, assessment, examination, medication review/ordering, and EMR review.    Plan of care was discussed extensively with patient. Patient verbalized understanding through teach back method. All questions and concerns addressed upon examination.     Of note, this documentation is completed using the Dragon Dictation system (voice recognition software). There may be spelling and/or grammatical errors that were not corrected prior to final submission.

## 2024-03-05 NOTE — PROGRESS NOTES
Infectious Disease Progress Note       3/5/2024    Patient is a followup regarding right chest wall wound with history of pneumonia.  Patient has liver lesions and pulmonary nodules that are currently being worked up.  Cultures pending final -currently showing rare gram-positive cocci and mixed gram-positive bacteria    Tissue cultures from surgical intervention are currently pending  Still complains about pain for the most part    Lab Results   Component Value Date    WBC 15.0 (H) 03/05/2024    HGB 9.1 (L) 03/05/2024    HCT 28.5 (L) 03/05/2024    MCV 86 03/05/2024     (H) 03/05/2024     Lab Results   Component Value Date    GLUCOSE 120 (H) 03/05/2024    CALCIUM 9.1 03/05/2024     (L) 03/05/2024    K 4.5 03/05/2024    CO2 29 03/05/2024    CL 99 03/05/2024    BUN 15 03/05/2024    CREATININE 0.67 03/05/2024       WBC trends are being monitored. Antibiotic doses are being adjusted per most recent renal labs.     Vitals:    03/05/24 0436   BP: 106/55   Pulse: 54   Resp: 18   Temp: 35.7 °C (96.3 °F)   SpO2: 96%     Patient is awake and alert, asking for adjustment in his pain medication.  No conversational dyspnea  NAD  Neck supple  Heart S1S2  Chest: Equal expansion, bilaterally clear to auscultation, right chest wall appears to be okay at this time  Abdomen: soft, ND, NTTP, no guarding  Extrem: no pain to palpation  Skin: no rashes, no diaphoresis  Neuro: CNS intact  Affect appropriate and patient is interactive        Patient Active Problem List   Diagnosis    Chest wall mass    Wound infection    Current smoker       Assessment:   Right chest wall necrotic draining nonhealing nonsurgical wound  Liver lesions  Pulmonary nodules  Right chest wall pain     Plan:   Pathology still pending  Multiple surgical samples sent for pathology and microbiology.  Gram-positive's noted: planning to rule out actinomyces although this can be difficult to obtain on culture.   Ceftriaxone for the next 2 weeks.  He will need  to follow-up in the office or via virtual visit for further instruction.  Will require long-term treatment and transition to oral antibiotics based on culture results.  Will treat for possible actinomyces infection    Prescription left in chart  Imaging and labs were reviewed per medical records and any ID pertinent labs were also addressed        Nelda Hill DO

## 2024-03-06 ENCOUNTER — INFUSION (OUTPATIENT)
Dept: HEMATOLOGY/ONCOLOGY | Facility: CLINIC | Age: 44
End: 2024-03-06
Payer: COMMERCIAL

## 2024-03-06 VITALS
BODY MASS INDEX: 20.99 KG/M2 | WEIGHT: 155 LBS | DIASTOLIC BLOOD PRESSURE: 72 MMHG | HEIGHT: 72 IN | RESPIRATION RATE: 20 BRPM | OXYGEN SATURATION: 97 % | TEMPERATURE: 98.4 F | HEART RATE: 71 BPM | SYSTOLIC BLOOD PRESSURE: 126 MMHG

## 2024-03-06 DIAGNOSIS — T14.8XXA OPEN WOUND: ICD-10-CM

## 2024-03-06 DIAGNOSIS — L08.9 INFECTION OF SKIN AND SUBCUTANEOUS TISSUE: ICD-10-CM

## 2024-03-06 DIAGNOSIS — A41.9: ICD-10-CM

## 2024-03-06 DIAGNOSIS — R22.2 CHEST WALL MASS: ICD-10-CM

## 2024-03-06 LAB
BACTERIA BLD CULT: NORMAL
BACTERIA BLD CULT: NORMAL
BACTERIA SPEC CULT: NORMAL
GRAM STN SPEC: NORMAL
GRAM STN SPEC: NORMAL

## 2024-03-06 PROCEDURE — 2500000004 HC RX 250 GENERAL PHARMACY W/ HCPCS (ALT 636 FOR OP/ED): Performed by: INTERNAL MEDICINE

## 2024-03-06 PROCEDURE — 96365 THER/PROPH/DIAG IV INF INIT: CPT | Mod: INF

## 2024-03-06 RX ADMIN — DEXTROSE MONOHYDRATE 2 G: 5 INJECTION INTRAVENOUS at 14:25

## 2024-03-06 ASSESSMENT — PAIN SCALES - GENERAL: PAINLEVEL: 4

## 2024-03-06 NOTE — DOCUMENTATION CLARIFICATION NOTE
"    PATIENT:               JULIAN CHATTERJEE  ACCT #:                  [id number]  MRN:                       [id number]  :                       1980  ADMIT DATE:       3/2/2024 5:32 AM  DISCH DATE:        3/5/2024 2:53 PM  RESPONDING PROVIDER #:        [id number]          PROVIDER RESPONSE TEXT:    Sepsis was a differential diagnosis and ruled out after study    CDI QUERY TEXT:    [facility]_CV Sepsis    Instruction:  Based on your assessment of the patient and the clinical information, please provide the requested documentation by clicking on the appropriate radio button and enter any additional information if prompted.    Question: Sepsis was documented in the medical record. Based on the documentation and the clinical information, can the diagnosis be further clarified as    When answering this query, please exercise your independent professional judgment. The fact that a question is being asked, does not imply that any particular answer is desired or expected.    The patient's clinical indicators include:  Clinical Information: 42 yo male admitted with chest wall abscess, concern for metastatic cancer, hypokalemia, and sepsis.    Documented Diagnosis: \"Sepsis, due to unspecified organism, unspecified whether acute organ dysfunction present\" ED note 3/2 Kinsey Lowery NP    Clinical Indicators:  -Vital Signs:  (3/2 0548) Temp-36.0 HR-123 RR-18 BP-109/71 SPO2-99 RA  -WBC: 19.7 (3/2 0656), 10.1 (3/3 0638), 9.4 (3/4 0706)  -Microbiology Results: Wound culture (3/2)  (3+) Moderate Polymorphonuclear leukocytes  (3+) Moderate Mixed Gram positive bacteria  -Neutrophils: 15.86 (3/2 0656), 6.57 (3/3 0638), 5.53 (3/4 0706)  -Lactic acid: 0.8 (3/2 0656)  -BUN/Creat: 20/0.90 (3/2 0656), 13/0.69 (3/3 0638), 14/0.73 (3/4 0706)  -Blood cultures: NGTD (3/2)  -Bilirubin: 0.6 (3/2 0656)  -MAP:  89 (3/2 1930), 83 (3/3 05)  -[place] Coma Scale: 15  -Procalcitonin: n/a  -Platelets: 489 (3/2 0656), 424 (3/3 0638), 447 (3/4 0706)    3/2 ED " "note Kinsey Lowery: \"Given that the patient is tachycardic, has an elevated white count concern for sepsis, started on broad-spectrum antibiotics, IV fluids.\"    3/4 PN Dr. Villegas: \"Right chest wall necrotic mass (SIRS 2/4 WBC count, heart rate) s/p bedside I&D (3/2)\"    Treatment: cefepime IV 2 g q 8, vancomycin 2 g IV, Flagyl 500 mgIV, clindamycin (Cleocin) capsule 300 mg 4 x daily, vancomycin (Vancocin) 1,000 mg IV q 12    Risk Factors: SIRS  Options provided:  -- Sepsis was a differential diagnosis and ruled out after study  -- Sepsis with other organ dysfunction, Please specify sepsis associated organ dysfunction below  -- Other - I will add my own diagnosis  -- Refer to Clinical Documentation Reviewer    Query created by: Joy Cifuentes on 3/6/2024 10:07 AM      Electronically signed by:  SUMEET CARLTON MD 3/6/2024 10:13 AM          "

## 2024-03-06 NOTE — PROGRESS NOTES
Day 1:  Patient tolerated antibiotic infusion without sign of adverse reaction.  To return daily.  Discharged in stable condition.

## 2024-03-07 ENCOUNTER — SOCIAL WORK (OUTPATIENT)
Dept: CASE MANAGEMENT | Facility: HOSPITAL | Age: 44
End: 2024-03-07
Payer: COMMERCIAL

## 2024-03-07 ENCOUNTER — INFUSION (OUTPATIENT)
Dept: HEMATOLOGY/ONCOLOGY | Facility: CLINIC | Age: 44
End: 2024-03-07
Payer: COMMERCIAL

## 2024-03-07 VITALS
TEMPERATURE: 98.2 F | SYSTOLIC BLOOD PRESSURE: 112 MMHG | RESPIRATION RATE: 18 BRPM | DIASTOLIC BLOOD PRESSURE: 66 MMHG | HEART RATE: 70 BPM | OXYGEN SATURATION: 97 %

## 2024-03-07 DIAGNOSIS — L08.9 WOUND INFECTION: ICD-10-CM

## 2024-03-07 DIAGNOSIS — L08.9 INFECTION OF SKIN AND SUBCUTANEOUS TISSUE: ICD-10-CM

## 2024-03-07 DIAGNOSIS — R22.2 CHEST WALL MASS: ICD-10-CM

## 2024-03-07 DIAGNOSIS — A41.9: ICD-10-CM

## 2024-03-07 DIAGNOSIS — T14.8XXA WOUND INFECTION: ICD-10-CM

## 2024-03-07 LAB
B-LACTAMASE ORGANISM ISLT: NEGATIVE
BACTERIA SPEC CULT: ABNORMAL
BACTERIA SPEC CULT: NORMAL
BACTERIA SPEC CULT: NORMAL
GRAM STN SPEC: ABNORMAL
GRAM STN SPEC: ABNORMAL
GRAM STN SPEC: NORMAL
LABORATORY COMMENT REPORT: NORMAL
LABORATORY COMMENT REPORT: NORMAL
PATH REPORT.FINAL DX SPEC: NORMAL
PATH REPORT.GROSS SPEC: NORMAL
PATH REPORT.RELEVANT HX SPEC: NORMAL
PATH REPORT.TOTAL CANCER: NORMAL

## 2024-03-07 PROCEDURE — 96365 THER/PROPH/DIAG IV INF INIT: CPT | Mod: INF

## 2024-03-07 PROCEDURE — 2500000004 HC RX 250 GENERAL PHARMACY W/ HCPCS (ALT 636 FOR OP/ED): Performed by: INTERNAL MEDICINE

## 2024-03-07 RX ADMIN — DEXTROSE MONOHYDRATE 2 G: 5 INJECTION INTRAVENOUS at 14:45

## 2024-03-07 ASSESSMENT — PAIN SCALES - GENERAL: PAINLEVEL: 0-NO PAIN

## 2024-03-07 NOTE — PROGRESS NOTES
Social Work Note  3/7/2024 SW contacted patient today by phone.  SW asked for the paperwork to start his financial assistance.  Three months of paystubs prior to his first encounter at  and to sign forms.  Patient stated that wife has not worked that long at her job.  SW asked him to bring in what she did have.  Patient also concerned as his dressing had not been changed for 2 days as his wife is allergic to the latex gloves.  Patient also wanted a nurse to look at his dressing and possibly change this for him today.  Gloves left at the  today and SW contacted [facility] to let them know of patient's request.  SW will remain available to assist patient.  Catherine Dumont, MSW, LSW

## 2024-03-07 NOTE — PROGRESS NOTES
Patient tolerated antibiotic infusion without sign of adverse reaction.  To return daily.  Discharged in stable condition.

## 2024-03-08 ENCOUNTER — APPOINTMENT (OUTPATIENT)
Dept: CARE COORDINATION | Age: 44
End: 2024-03-08
Payer: COMMERCIAL

## 2024-03-08 ENCOUNTER — INFUSION (OUTPATIENT)
Dept: HEMATOLOGY/ONCOLOGY | Facility: CLINIC | Age: 44
End: 2024-03-08
Payer: COMMERCIAL

## 2024-03-08 VITALS
TEMPERATURE: 99 F | DIASTOLIC BLOOD PRESSURE: 63 MMHG | SYSTOLIC BLOOD PRESSURE: 114 MMHG | OXYGEN SATURATION: 98 % | HEART RATE: 62 BPM | RESPIRATION RATE: 20 BRPM

## 2024-03-08 DIAGNOSIS — L08.9 WOUND INFECTION: ICD-10-CM

## 2024-03-08 DIAGNOSIS — A41.9: ICD-10-CM

## 2024-03-08 DIAGNOSIS — T14.8XXA WOUND INFECTION: ICD-10-CM

## 2024-03-08 DIAGNOSIS — L08.9 INFECTION OF SKIN AND SUBCUTANEOUS TISSUE: ICD-10-CM

## 2024-03-08 DIAGNOSIS — R22.2 CHEST WALL MASS: ICD-10-CM

## 2024-03-08 PROCEDURE — 96365 THER/PROPH/DIAG IV INF INIT: CPT | Mod: INF

## 2024-03-08 PROCEDURE — 2500000004 HC RX 250 GENERAL PHARMACY W/ HCPCS (ALT 636 FOR OP/ED): Performed by: INTERNAL MEDICINE

## 2024-03-08 RX ADMIN — DEXTROSE MONOHYDRATE 2 G: 5 INJECTION INTRAVENOUS at 15:03

## 2024-03-08 ASSESSMENT — PAIN SCALES - GENERAL: PAINLEVEL: 4

## 2024-03-10 ENCOUNTER — HOSPITAL ENCOUNTER (OUTPATIENT)
Facility: HOSPITAL | Age: 44
Discharge: HOME | End: 2024-03-10
Attending: INTERNAL MEDICINE | Admitting: INTERNAL MEDICINE
Payer: COMMERCIAL

## 2024-03-10 PROCEDURE — 2500000004 HC RX 250 GENERAL PHARMACY W/ HCPCS (ALT 636 FOR OP/ED): Performed by: INTERNAL MEDICINE

## 2024-03-10 PROCEDURE — 96365 THER/PROPH/DIAG IV INF INIT: CPT

## 2024-03-10 RX ADMIN — DEXTROSE MONOHYDRATE 2 G: 5 INJECTION INTRAVENOUS at 14:46

## 2024-03-11 ENCOUNTER — INFUSION (OUTPATIENT)
Dept: HEMATOLOGY/ONCOLOGY | Facility: CLINIC | Age: 44
End: 2024-03-11
Payer: COMMERCIAL

## 2024-03-11 ENCOUNTER — OFFICE VISIT (OUTPATIENT)
Dept: WOUND CARE | Facility: CLINIC | Age: 44
End: 2024-03-11
Payer: COMMERCIAL

## 2024-03-11 VITALS
TEMPERATURE: 97.3 F | BODY MASS INDEX: 21.67 KG/M2 | SYSTOLIC BLOOD PRESSURE: 120 MMHG | WEIGHT: 160 LBS | DIASTOLIC BLOOD PRESSURE: 70 MMHG | OXYGEN SATURATION: 99 % | RESPIRATION RATE: 20 BRPM | HEART RATE: 61 BPM | HEIGHT: 72 IN

## 2024-03-11 DIAGNOSIS — L08.9 WOUND INFECTION: ICD-10-CM

## 2024-03-11 DIAGNOSIS — L08.9 INFECTION OF SKIN AND SUBCUTANEOUS TISSUE: ICD-10-CM

## 2024-03-11 DIAGNOSIS — T14.8XXA WOUND INFECTION: ICD-10-CM

## 2024-03-11 DIAGNOSIS — A41.9: ICD-10-CM

## 2024-03-11 LAB
LABORATORY COMMENT REPORT: NORMAL
PATH REPORT.FINAL DX SPEC: NORMAL
PATH REPORT.FINAL DX SPEC: NORMAL
PATH REPORT.GROSS SPEC: NORMAL
PATH REPORT.GROSS SPEC: NORMAL
PATH REPORT.MICROSCOPIC SPEC OTHER STN: NORMAL
PATH REPORT.RELEVANT HX SPEC: NORMAL
PATH REPORT.RELEVANT HX SPEC: NORMAL
PATH REPORT.TOTAL CANCER: NORMAL
PATH REPORT.TOTAL CANCER: NORMAL

## 2024-03-11 PROCEDURE — 11046 DBRDMT MUSC&/FSCA EA ADDL: CPT

## 2024-03-11 PROCEDURE — 2500000004 HC RX 250 GENERAL PHARMACY W/ HCPCS (ALT 636 FOR OP/ED): Performed by: INTERNAL MEDICINE

## 2024-03-11 PROCEDURE — 96365 THER/PROPH/DIAG IV INF INIT: CPT | Mod: INF

## 2024-03-11 PROCEDURE — 99213 OFFICE O/P EST LOW 20 MIN: CPT | Mod: 25

## 2024-03-11 PROCEDURE — 11043 DBRDMT MUSC&/FSCA 1ST 20/<: CPT | Performed by: PLASTIC SURGERY

## 2024-03-11 PROCEDURE — 11043 DBRDMT MUSC&/FSCA 1ST 20/<: CPT

## 2024-03-11 PROCEDURE — 99213 OFFICE O/P EST LOW 20 MIN: CPT | Performed by: PLASTIC SURGERY

## 2024-03-11 PROCEDURE — 11046 DBRDMT MUSC&/FSCA EA ADDL: CPT | Performed by: PLASTIC SURGERY

## 2024-03-11 RX ADMIN — DEXTROSE MONOHYDRATE 2 G: 5 INJECTION INTRAVENOUS at 11:17

## 2024-03-11 ASSESSMENT — PAIN SCALES - GENERAL: PAINLEVEL: 6

## 2024-03-12 ENCOUNTER — LAB REQUISITION (OUTPATIENT)
Dept: LAB | Facility: HOSPITAL | Age: 44
End: 2024-03-12
Payer: MEDICAID

## 2024-03-12 ENCOUNTER — INFUSION (OUTPATIENT)
Dept: HEMATOLOGY/ONCOLOGY | Facility: CLINIC | Age: 44
End: 2024-03-12
Payer: COMMERCIAL

## 2024-03-12 VITALS
DIASTOLIC BLOOD PRESSURE: 64 MMHG | SYSTOLIC BLOOD PRESSURE: 110 MMHG | RESPIRATION RATE: 20 BRPM | HEART RATE: 75 BPM | OXYGEN SATURATION: 96 % | TEMPERATURE: 98.4 F

## 2024-03-12 DIAGNOSIS — T14.8XXA OTHER INJURY OF UNSPECIFIED BODY REGION, INITIAL ENCOUNTER: ICD-10-CM

## 2024-03-12 DIAGNOSIS — L08.9 INFECTION OF SKIN AND SUBCUTANEOUS TISSUE: ICD-10-CM

## 2024-03-12 DIAGNOSIS — T14.8XXA WOUND INFECTION: ICD-10-CM

## 2024-03-12 DIAGNOSIS — R22.2 LOCALIZED SWELLING, MASS AND LUMP, TRUNK: ICD-10-CM

## 2024-03-12 DIAGNOSIS — L08.9 WOUND INFECTION: ICD-10-CM

## 2024-03-12 DIAGNOSIS — A41.9: ICD-10-CM

## 2024-03-12 DIAGNOSIS — L08.9 LOCAL INFECTION OF THE SKIN AND SUBCUTANEOUS TISSUE, UNSPECIFIED: ICD-10-CM

## 2024-03-12 DIAGNOSIS — A41.9 SEPSIS, UNSPECIFIED ORGANISM (MULTI): ICD-10-CM

## 2024-03-12 LAB
ANION GAP SERPL CALC-SCNC: 12 MMOL/L (ref 10–20)
BUN SERPL-MCNC: 11 MG/DL (ref 6–23)
CALCIUM SERPL-MCNC: 9.1 MG/DL (ref 8.6–10.3)
CHLORIDE SERPL-SCNC: 104 MMOL/L (ref 98–107)
CO2 SERPL-SCNC: 27 MMOL/L (ref 21–32)
CREAT SERPL-MCNC: 0.76 MG/DL (ref 0.5–1.3)
EGFRCR SERPLBLD CKD-EPI 2021: >90 ML/MIN/1.73M*2
ERYTHROCYTE [DISTWIDTH] IN BLOOD BY AUTOMATED COUNT: 17.7 % (ref 11.5–14.5)
GLUCOSE SERPL-MCNC: 98 MG/DL (ref 74–99)
HCT VFR BLD AUTO: 30.1 % (ref 41–52)
HGB BLD-MCNC: 9.5 G/DL (ref 13.5–17.5)
MCH RBC QN AUTO: 28.4 PG (ref 26–34)
MCHC RBC AUTO-ENTMCNC: 31.6 G/DL (ref 32–36)
MCV RBC AUTO: 90 FL (ref 80–100)
NRBC BLD-RTO: 0 /100 WBCS (ref 0–0)
PLATELET # BLD AUTO: 341 X10*3/UL (ref 150–450)
POTASSIUM SERPL-SCNC: 4.3 MMOL/L (ref 3.5–5.3)
RBC # BLD AUTO: 3.35 X10*6/UL (ref 4.5–5.9)
SODIUM SERPL-SCNC: 139 MMOL/L (ref 136–145)
WBC # BLD AUTO: 7 X10*3/UL (ref 4.4–11.3)

## 2024-03-12 PROCEDURE — 2500000004 HC RX 250 GENERAL PHARMACY W/ HCPCS (ALT 636 FOR OP/ED): Performed by: INTERNAL MEDICINE

## 2024-03-12 PROCEDURE — 80048 BASIC METABOLIC PNL TOTAL CA: CPT

## 2024-03-12 PROCEDURE — 96365 THER/PROPH/DIAG IV INF INIT: CPT | Mod: INF

## 2024-03-12 PROCEDURE — 85027 COMPLETE CBC AUTOMATED: CPT

## 2024-03-12 PROCEDURE — 36415 COLL VENOUS BLD VENIPUNCTURE: CPT

## 2024-03-12 RX ADMIN — DEXTROSE MONOHYDRATE 2 G: 5 INJECTION INTRAVENOUS at 14:43

## 2024-03-12 ASSESSMENT — PAIN SCALES - GENERAL: PAINLEVEL: 4

## 2024-03-13 ENCOUNTER — INFUSION (OUTPATIENT)
Dept: HEMATOLOGY/ONCOLOGY | Facility: CLINIC | Age: 44
End: 2024-03-13
Payer: COMMERCIAL

## 2024-03-13 VITALS
DIASTOLIC BLOOD PRESSURE: 56 MMHG | HEART RATE: 63 BPM | RESPIRATION RATE: 20 BRPM | TEMPERATURE: 98.6 F | OXYGEN SATURATION: 97 % | SYSTOLIC BLOOD PRESSURE: 105 MMHG

## 2024-03-13 DIAGNOSIS — A41.9: ICD-10-CM

## 2024-03-13 PROCEDURE — 96365 THER/PROPH/DIAG IV INF INIT: CPT | Mod: INF

## 2024-03-13 PROCEDURE — 2500000004 HC RX 250 GENERAL PHARMACY W/ HCPCS (ALT 636 FOR OP/ED)

## 2024-03-13 RX ADMIN — DEXTROSE MONOHYDRATE 2 G: 5 INJECTION INTRAVENOUS at 14:52

## 2024-03-14 ENCOUNTER — OFFICE VISIT (OUTPATIENT)
Dept: INFECTIOUS DISEASES | Age: 44
End: 2024-03-14

## 2024-03-14 VITALS
RESPIRATION RATE: 20 BRPM | BODY MASS INDEX: 20.99 KG/M2 | HEART RATE: 66 BPM | TEMPERATURE: 99.7 F | DIASTOLIC BLOOD PRESSURE: 75 MMHG | HEIGHT: 72 IN | SYSTOLIC BLOOD PRESSURE: 118 MMHG | OXYGEN SATURATION: 97 % | WEIGHT: 155 LBS

## 2024-03-14 DIAGNOSIS — M62.89 NONHEALING NONSURGICAL WOUND WITH NECROSIS OF MUSCLE: Primary | ICD-10-CM

## 2024-03-14 DIAGNOSIS — Z87.01 HISTORY OF PNEUMONIA: ICD-10-CM

## 2024-03-14 DIAGNOSIS — T14.8XXA NONHEALING NONSURGICAL WOUND WITH NECROSIS OF MUSCLE: Primary | ICD-10-CM

## 2024-03-14 DIAGNOSIS — A49.9 GRAM POSITIVE BACTERIAL INFECTION: ICD-10-CM

## 2024-03-14 PROCEDURE — 99214 OFFICE O/P EST MOD 30 MIN: CPT | Performed by: INTERNAL MEDICINE

## 2024-03-14 ASSESSMENT — PATIENT HEALTH QUESTIONNAIRE - PHQ9
2. FEELING DOWN, DEPRESSED OR HOPELESS: NOT AT ALL
SUM OF ALL RESPONSES TO PHQ QUESTIONS 1-9: 0
1. LITTLE INTEREST OR PLEASURE IN DOING THINGS: NOT AT ALL
SUM OF ALL RESPONSES TO PHQ9 QUESTIONS 1 & 2: 0
SUM OF ALL RESPONSES TO PHQ QUESTIONS 1-9: 0

## 2024-03-14 NOTE — PROGRESS NOTES
Jeremy Bustamante  1980  male  Medical Record Number: [id number]    Patient informed that I am an Infectious Disease physician and permission obtained from the patient to speak in front of any visitors prior to any discussion for HIPPA purposes.     HPI: 43-year-old male presented to [facility] with right chest wall abscess now status post I&D, currently being treated with ceftriaxone times course of 14 days tissue culture results from 3/11/2024 showing only mixed emir HIV screen nonreactive previous tissue cultures with nonspecific findings from 3/4/2024 1 tissue culture from 3/4/2024 showing rare gram-positive cocci.  Patient had CT-guided aspiration of the abscess MRSA surveillance was negative on 3/2/2024 blood cultures were negative on 3/2/2024    Repeat CT scan  - coordinate care with team  CRP  Stop date IV rocephin is March 19    Needs new labs including CRP  Change to PO Augmentin x 3 weeks.   Schedule CT after that    Photos reviewed  Final path results still pending    Subjectively, no new complaints at this time.     Review of Systems: All 14 review of systems were discussed with the patient and are negative other than as stated above        Past Surgical History:   Procedure Laterality Date    FRACTURE SURGERY Left 7/17/2017    OPEN REDUCTION  INTERNAL FIXATION PROXIMAL PHALANX LEFT LONG FINGER  (PAT ON ADMIT)  performed by Lennox Sanon MD at [facility]    HAND SURGERY Left 7/17/2017    LEFT IRRIGATION AND DEBRIDEMENT LEFT HAND  MINI C-ARM SYNTHES MODULAR TRAY performed by Lennox Sanon MD at [facility]       Social History     Socioeconomic History    Marital status:      Spouse name: Not on file    Number of children: Not on file    Years of education: Not on file    Highest education level: Not on file   Occupational History    Not on file   Tobacco Use    Smoking status: Every Day     Current packs/day: 0.50     Types: Cigarettes    Smokeless tobacco: Former     Types: Chew   Vaping Use

## 2024-03-15 ENCOUNTER — INFUSION (OUTPATIENT)
Dept: HEMATOLOGY/ONCOLOGY | Facility: CLINIC | Age: 44
End: 2024-03-15
Payer: COMMERCIAL

## 2024-03-15 ENCOUNTER — SOCIAL WORK (OUTPATIENT)
Dept: CASE MANAGEMENT | Facility: HOSPITAL | Age: 44
End: 2024-03-15

## 2024-03-15 VITALS
HEART RATE: 63 BPM | TEMPERATURE: 98.6 F | OXYGEN SATURATION: 99 % | DIASTOLIC BLOOD PRESSURE: 64 MMHG | SYSTOLIC BLOOD PRESSURE: 118 MMHG | RESPIRATION RATE: 20 BRPM

## 2024-03-15 DIAGNOSIS — L08.9 WOUND INFECTION: ICD-10-CM

## 2024-03-15 DIAGNOSIS — T14.8XXA WOUND INFECTION: ICD-10-CM

## 2024-03-15 DIAGNOSIS — A41.9: ICD-10-CM

## 2024-03-15 DIAGNOSIS — L08.9 INFECTION OF SKIN AND SUBCUTANEOUS TISSUE: ICD-10-CM

## 2024-03-15 PROCEDURE — 2500000004 HC RX 250 GENERAL PHARMACY W/ HCPCS (ALT 636 FOR OP/ED): Performed by: INTERNAL MEDICINE

## 2024-03-15 PROCEDURE — 96365 THER/PROPH/DIAG IV INF INIT: CPT | Mod: INF

## 2024-03-15 RX ADMIN — DEXTROSE MONOHYDRATE 2 G: 5 INJECTION INTRAVENOUS at 13:57

## 2024-03-15 ASSESSMENT — PAIN SCALES - GENERAL: PAINLEVEL: 0-NO PAIN

## 2024-03-15 NOTE — PROGRESS NOTES
Social Work Note  3/15/2024 SW contacted patient to remind him to bring in his wife's paystubs and SW will have him sign for hospital assistance as well.  Patient has had a lot of appointments with his daily infusions.  SHANTELL encouraged him to do this prior to the appointment with Dr. Fritz on the 25th of this month so there will not be any delays in his appointment.  SW went over patient's upcoming appointments with him while we talked.  SW will remain available to assist patient.  Catherine Dumont, MSW, LSW

## 2024-03-16 ENCOUNTER — HOSPITAL ENCOUNTER (EMERGENCY)
Facility: HOSPITAL | Age: 44
Discharge: STILL ADMITTED OR ADMINISTRATIVE | End: 2024-03-16
Payer: COMMERCIAL

## 2024-03-16 ENCOUNTER — HOSPITAL ENCOUNTER (OUTPATIENT)
Facility: HOSPITAL | Age: 44
Discharge: HOME | End: 2024-03-16
Attending: INTERNAL MEDICINE | Admitting: INTERNAL MEDICINE
Payer: COMMERCIAL

## 2024-03-16 PROCEDURE — 2500000004 HC RX 250 GENERAL PHARMACY W/ HCPCS (ALT 636 FOR OP/ED): Performed by: INTERNAL MEDICINE

## 2024-03-16 PROCEDURE — 96365 THER/PROPH/DIAG IV INF INIT: CPT

## 2024-03-16 RX ADMIN — DEXTROSE MONOHYDRATE 2 G: 5 INJECTION INTRAVENOUS at 15:17

## 2024-03-17 ENCOUNTER — HOSPITAL ENCOUNTER (OUTPATIENT)
Facility: HOSPITAL | Age: 44
Discharge: HOME | End: 2024-03-17
Attending: INTERNAL MEDICINE | Admitting: INTERNAL MEDICINE
Payer: COMMERCIAL

## 2024-03-17 ENCOUNTER — HOSPITAL ENCOUNTER (EMERGENCY)
Facility: HOSPITAL | Age: 44
Discharge: HOME | End: 2024-03-17
Attending: STUDENT IN AN ORGANIZED HEALTH CARE EDUCATION/TRAINING PROGRAM
Payer: COMMERCIAL

## 2024-03-17 VITALS
BODY MASS INDEX: 21.2 KG/M2 | SYSTOLIC BLOOD PRESSURE: 114 MMHG | OXYGEN SATURATION: 97 % | WEIGHT: 156.53 LBS | HEIGHT: 72 IN | HEART RATE: 65 BPM | RESPIRATION RATE: 18 BRPM | DIASTOLIC BLOOD PRESSURE: 70 MMHG | TEMPERATURE: 98.1 F

## 2024-03-17 DIAGNOSIS — R07.89 CHEST WALL PAIN: Primary | ICD-10-CM

## 2024-03-17 PROCEDURE — 96365 THER/PROPH/DIAG IV INF INIT: CPT | Performed by: INTERNAL MEDICINE

## 2024-03-17 PROCEDURE — 99281 EMR DPT VST MAYX REQ PHY/QHP: CPT

## 2024-03-17 PROCEDURE — 2500000004 HC RX 250 GENERAL PHARMACY W/ HCPCS (ALT 636 FOR OP/ED): Performed by: INTERNAL MEDICINE

## 2024-03-17 RX ORDER — OXYCODONE HYDROCHLORIDE 5 MG/1
5 TABLET ORAL EVERY 8 HOURS PRN
Qty: 9 TABLET | Refills: 0 | Status: SHIPPED | OUTPATIENT
Start: 2024-03-17 | End: 2024-03-22 | Stop reason: ALTCHOICE

## 2024-03-17 RX ADMIN — DEXTROSE MONOHYDRATE 2 G: 5 INJECTION INTRAVENOUS at 13:05

## 2024-03-17 ASSESSMENT — PAIN DESCRIPTION - LOCATION: LOCATION: ABDOMEN

## 2024-03-17 ASSESSMENT — LIFESTYLE VARIABLES
EVER HAD A DRINK FIRST THING IN THE MORNING TO STEADY YOUR NERVES TO GET RID OF A HANGOVER: NO
HAVE YOU EVER FELT YOU SHOULD CUT DOWN ON YOUR DRINKING: NO
HAVE PEOPLE ANNOYED YOU BY CRITICIZING YOUR DRINKING: NO
EVER FELT BAD OR GUILTY ABOUT YOUR DRINKING: NO

## 2024-03-17 ASSESSMENT — PAIN SCALES - GENERAL: PAINLEVEL_OUTOF10: 6

## 2024-03-17 ASSESSMENT — PAIN - FUNCTIONAL ASSESSMENT: PAIN_FUNCTIONAL_ASSESSMENT: 0-10

## 2024-03-17 ASSESSMENT — PAIN DESCRIPTION - ORIENTATION: ORIENTATION: RIGHT

## 2024-03-17 NOTE — ED PROVIDER NOTES
"HPI   Chief Complaint   Patient presents with   • Abdominal Pain     \"Here for pain medication was told to come here by the physician that admitted hime the last time he was here.  Is seeing the wound center and is coming here for antibiotics.  Cannot get into the primary doctor until 3/26 of this month.  Dr. Suresh.\"       Patient is a 43-year-old male with history of right chest wall mass, currently being evaluated for cancer who presents for low right chest wall and upper abdominal pain.  Patient states this is typical for him since his diagnosis, states it is worse as the day goes on.  Denies any chest pain, shortness of breath, fevers, chills, vomiting.  Does endorse diarrhea, though states this has been stable since he started on antibiotics.  Is currently getting infusions of ceftriaxone.  Does endorse a cough, however believes this is due to his cessation of smoking.  Patient is requesting pain medications as he is waiting to see a primary care doctor for more consistent pain meds.  Does not want to be further evaluated.                          [place] Coma Scale Score: 15                     Patient History   Past Medical History:   Diagnosis Date   • Liver cancer (CMS/HCC)    • Lung cancer (CMS/HCC)      Past Surgical History:   Procedure Laterality Date   • HAND SURGERY      left hand   • INCISION AND DRAINAGE  3/2/2024     No family history on file.  Social History     Tobacco Use   • Smoking status: Every Day     Packs/day: .5     Types: Cigarettes   • Smokeless tobacco: Never   Vaping Use   • Vaping Use: Never used   Substance Use Topics   • Alcohol use: Yes   • Drug use: Yes     Types: Marijuana       Physical Exam   ED Triage Vitals [03/17/24 1205]   Temperature Heart Rate Respirations BP   36.7 °C (98.1 °F) 65 18 114/70      Pulse Ox Temp Source Heart Rate Source Patient Position   97 % Temporal Monitor Sitting      BP Location FiO2 (%)     Right arm --       Physical Exam  Constitutional:       " General: He is not in acute distress.  HENT:      Head: Normocephalic.   Eyes:      Extraocular Movements: Extraocular movements intact.      Conjunctiva/sclera: Conjunctivae normal.      Pupils: Pupils are equal, round, and reactive to light.   Cardiovascular:      Rate and Rhythm: Normal rate and regular rhythm.      Pulses: Normal pulses.      Heart sounds: Normal heart sounds.   Pulmonary:      Effort: Pulmonary effort is normal.      Breath sounds: Normal breath sounds.   Abdominal:      General: There is no distension.      Palpations: Abdomen is soft. There is no mass.      Tenderness: There is no abdominal tenderness. There is no guarding.   Musculoskeletal:         General: No deformity.      Cervical back: Normal range of motion and neck supple.      Right lower leg: No edema.      Left lower leg: No edema.   Skin:     General: Skin is warm and dry.      Findings: No lesion or rash.      Comments: Approximately 6 x 10 wound in right chest wall, no bleeding or pus.  No surrounding bullae/necrotic tissue.   Neurological:      General: No focal deficit present.      Mental Status: He is alert and oriented to person, place, and time. Mental status is at baseline.      Cranial Nerves: No cranial nerve deficit.      Sensory: No sensory deficit.      Motor: No weakness.   Psychiatric:         Mood and Affect: Mood normal.         ED Course & MDM   Diagnoses as of 03/17/24 1228   Chest wall pain       Medical Decision Making  Patient is a 43-year-old male with above-stated past medical history presents for low right chest wall and upper abdominal pain.  I had a detailed discussion regarding evaluation of the patient today and he declines this.  He states that he has blood work either today or tomorrow and has CT scan scheduled in the next several days along with follow-up appointments.  We discussed risks and benefits and he continues to decline this.  Patient has capacity.  I we will give the patient a  prescription for pain medications as he is currently out of breakthrough pain medications.  I discussed very strict return precautions with the patient and he stated understanding agreement.  Patient is clinically well-appearing otherwise and nontoxic.  He has no increased work of breathing and does not appear acutely ill.  I did advise him that I cannot rule out life or limb-threatening diagnoses by a simple physical exam, these may also be debilitating.  Patient stated understanding and agreement.  Patient was discharged home with follow-up instructions.    Disclaimer: This note was dictated using speech recognition software. Minor errors in transcription may be present. Please call if questions.     Hair Alfaro MD  [facility] Emergency [facility]  Contact on [employer]            Procedure  Procedures     Hair Alfaro MD  03/17/24 6980

## 2024-03-18 ENCOUNTER — TELEPHONE (OUTPATIENT)
Dept: INFECTIOUS DISEASES | Age: 44
End: 2024-03-18

## 2024-03-18 ENCOUNTER — INFUSION (OUTPATIENT)
Dept: HEMATOLOGY/ONCOLOGY | Facility: CLINIC | Age: 44
End: 2024-03-18
Payer: COMMERCIAL

## 2024-03-18 ENCOUNTER — APPOINTMENT (OUTPATIENT)
Dept: WOUND CARE | Facility: CLINIC | Age: 44
End: 2024-03-18
Payer: COMMERCIAL

## 2024-03-18 VITALS
HEIGHT: 72 IN | HEART RATE: 61 BPM | OXYGEN SATURATION: 98 % | TEMPERATURE: 98.2 F | WEIGHT: 156.53 LBS | DIASTOLIC BLOOD PRESSURE: 69 MMHG | RESPIRATION RATE: 20 BRPM | SYSTOLIC BLOOD PRESSURE: 113 MMHG | BODY MASS INDEX: 21.2 KG/M2

## 2024-03-18 DIAGNOSIS — A41.9: ICD-10-CM

## 2024-03-18 PROCEDURE — 2500000004 HC RX 250 GENERAL PHARMACY W/ HCPCS (ALT 636 FOR OP/ED): Performed by: INTERNAL MEDICINE

## 2024-03-18 PROCEDURE — 96365 THER/PROPH/DIAG IV INF INIT: CPT | Mod: INF

## 2024-03-18 RX ORDER — AMOXICILLIN 500 MG/1
500 CAPSULE ORAL 3 TIMES DAILY
Qty: 90 CAPSULE | Refills: 0 | Status: SHIPPED | OUTPATIENT
Start: 2024-03-18 | End: 2024-04-17

## 2024-03-18 RX ADMIN — DEXTROSE MONOHYDRATE 2 G: 5 INJECTION INTRAVENOUS at 13:05

## 2024-03-18 ASSESSMENT — PAIN SCALES - GENERAL: PAINLEVEL: 4

## 2024-03-18 NOTE — TELEPHONE ENCOUNTER
Request is to clarify orders. Patient should be done with IV Abx by tomorrow, 3/19/24. A D/c Picc line order is needed  Patient seen by Dr Francis as of 3/13/24 and this order has been dictated but also a lab order for a CRP is requested. Will Fax over to the O/P Dept.     ID Note and orders faxed 3/19/24

## 2024-03-19 ENCOUNTER — INFUSION (OUTPATIENT)
Dept: HEMATOLOGY/ONCOLOGY | Facility: CLINIC | Age: 44
End: 2024-03-19
Payer: COMMERCIAL

## 2024-03-19 VITALS
RESPIRATION RATE: 20 BRPM | TEMPERATURE: 97.3 F | SYSTOLIC BLOOD PRESSURE: 116 MMHG | HEART RATE: 63 BPM | OXYGEN SATURATION: 98 % | DIASTOLIC BLOOD PRESSURE: 70 MMHG

## 2024-03-19 DIAGNOSIS — L08.9 INFECTION OF SKIN AND SUBCUTANEOUS TISSUE: ICD-10-CM

## 2024-03-19 DIAGNOSIS — A41.9: ICD-10-CM

## 2024-03-19 PROCEDURE — 96365 THER/PROPH/DIAG IV INF INIT: CPT | Mod: INF

## 2024-03-19 PROCEDURE — 2500000004 HC RX 250 GENERAL PHARMACY W/ HCPCS (ALT 636 FOR OP/ED): Performed by: INTERNAL MEDICINE

## 2024-03-19 RX ADMIN — DEXTROSE MONOHYDRATE 2 G: 5 INJECTION INTRAVENOUS at 14:37

## 2024-03-19 NOTE — PROGRESS NOTES
Patient tolerated removal of Midline without sign of adverse reaction.  After PICC/Midline removal home going instructions given and reviewed with patient.  Patient tolerated antibiotic infusion without sign of adverse reaction.  Treatment complete. Patient discharged in stable condition.

## 2024-03-21 ENCOUNTER — HOSPITAL ENCOUNTER (OUTPATIENT)
Dept: RADIOLOGY | Facility: CLINIC | Age: 44
Discharge: HOME | End: 2024-03-21
Payer: COMMERCIAL

## 2024-03-21 ENCOUNTER — TELEPHONE (OUTPATIENT)
Dept: INFECTIOUS DISEASES | Age: 44
End: 2024-03-21

## 2024-03-21 DIAGNOSIS — R91.8 LUNG MASS: ICD-10-CM

## 2024-03-21 PROCEDURE — A9552 F18 FDG: HCPCS

## 2024-03-21 PROCEDURE — 78815 PET IMAGE W/CT SKULL-THIGH: CPT | Mod: PI

## 2024-03-21 PROCEDURE — 78815 PET IMAGE W/CT SKULL-THIGH: CPT | Mod: PET TUMOR INIT TX STRAT | Performed by: NUCLEAR MEDICINE

## 2024-03-21 PROCEDURE — 3430000001 HC RX 343 DIAGNOSTIC RADIOPHARMACEUTICALS

## 2024-03-21 RX ORDER — FLUDEOXYGLUCOSE F 18 200 MCI/ML
10.5 INJECTION, SOLUTION INTRAVENOUS
Status: COMPLETED | OUTPATIENT
Start: 2024-03-21 | End: 2024-03-21

## 2024-03-21 RX ADMIN — FLUDEOXYGLUCOSE F 18 10.5 MILLICURIE: 200 INJECTION, SOLUTION INTRAVENOUS at 09:12

## 2024-03-21 NOTE — TELEPHONE ENCOUNTER
Patient called earlier today, request a change in po Abx. He has been prescribed Amoxicillin, but he is Allergic to this Abx. He said the last time he tried this medication was back in Nov. 2023 for a dental procedure and was unable to tolerate.Patient concerned as he is without any Abx at this time.   Will update ID physician.

## 2024-03-22 ENCOUNTER — OFFICE VISIT (OUTPATIENT)
Dept: PRIMARY CARE | Facility: CLINIC | Age: 44
End: 2024-03-22
Payer: COMMERCIAL

## 2024-03-22 ENCOUNTER — OFFICE VISIT (OUTPATIENT)
Dept: SURGERY | Facility: CLINIC | Age: 44
End: 2024-03-22
Payer: COMMERCIAL

## 2024-03-22 VITALS
DIASTOLIC BLOOD PRESSURE: 70 MMHG | TEMPERATURE: 97.3 F | OXYGEN SATURATION: 98 % | SYSTOLIC BLOOD PRESSURE: 112 MMHG | HEIGHT: 72 IN | WEIGHT: 157 LBS | HEART RATE: 58 BPM | BODY MASS INDEX: 21.26 KG/M2 | RESPIRATION RATE: 14 BRPM

## 2024-03-22 VITALS
HEART RATE: 59 BPM | HEIGHT: 72 IN | WEIGHT: 157 LBS | OXYGEN SATURATION: 98 % | BODY MASS INDEX: 21.26 KG/M2 | SYSTOLIC BLOOD PRESSURE: 123 MMHG | RESPIRATION RATE: 16 BRPM | DIASTOLIC BLOOD PRESSURE: 70 MMHG | TEMPERATURE: 97.3 F

## 2024-03-22 DIAGNOSIS — L08.9 INFECTION OF SKIN AND SUBCUTANEOUS TISSUE: ICD-10-CM

## 2024-03-22 DIAGNOSIS — R22.2 CHEST WALL MASS: ICD-10-CM

## 2024-03-22 DIAGNOSIS — R91.8 LUNG MASS: ICD-10-CM

## 2024-03-22 DIAGNOSIS — F17.200 SMOKING ADDICTION: ICD-10-CM

## 2024-03-22 DIAGNOSIS — R22.2 CHEST WALL MASS: Primary | ICD-10-CM

## 2024-03-22 DIAGNOSIS — R91.8 LUNG MASS: Primary | ICD-10-CM

## 2024-03-22 DIAGNOSIS — R91.1 LUNG NODULE: Primary | ICD-10-CM

## 2024-03-22 PROBLEM — A41.9: Status: RESOLVED | Noted: 2024-03-06 | Resolved: 2024-03-22

## 2024-03-22 PROCEDURE — 99204 OFFICE O/P NEW MOD 45 MIN: CPT | Performed by: INTERNAL MEDICINE

## 2024-03-22 PROCEDURE — 99215 OFFICE O/P EST HI 40 MIN: CPT | Performed by: STUDENT IN AN ORGANIZED HEALTH CARE EDUCATION/TRAINING PROGRAM

## 2024-03-22 RX ORDER — VARENICLINE TARTRATE 0.5 MG/1
0.5 TABLET, FILM COATED ORAL 2 TIMES DAILY
Qty: 60 TABLET | Refills: 2 | Status: SHIPPED | OUTPATIENT
Start: 2024-03-22 | End: 2024-04-04 | Stop reason: WASHOUT

## 2024-03-22 RX ORDER — OXYCODONE AND ACETAMINOPHEN 5; 325 MG/1; MG/1
1 TABLET ORAL DAILY PRN
Qty: 30 TABLET | Refills: 0 | Status: SHIPPED | OUTPATIENT
Start: 2024-03-22 | End: 2024-04-26 | Stop reason: ALTCHOICE

## 2024-03-22 NOTE — PROGRESS NOTES
HPI:   Ritesh Luther is a 43 y.o. male with a pmhx of heavy smoking, lung nodules and chest wall abscess who presents today for evaluation and treatment of lung nodules.  I saw him initially inpatient while he will has chest wall infections.  He underwent I&D of the chest wall infection by general surgery.  He also have CT-guided perihepatic abscess aspiration by IR at the time.  He has recovered from that he still has a wound in the right chest required packing and dressing change.  Overall he feels better and he has gained some weight.  He is due antibiotics per ID.  He is a very anxious with family and recent illness.  He has tried to cut down his smoking from 1 pack a day to 4-5 cigarettes a day.    PMHx: per HPI  PSHx: Chest wall abscess I&D.  SHx: current smoker (20ppy), deny ETOH, or illicit drugs   FMHx: father  of lung cancer    Current Outpatient Medications:     cefTRIAXone (Rocephin) 2 gram/50 mL IV, Infuse 50 mL (2 g) at 100 mL/hr over 30 minutes into a venous catheter once every 24 hours., Disp: , Rfl:     lactobacillus acidophilus tablet tablet, Take 1 tablet by mouth 3 times a day., Disp: 90 tablet, Rfl: 0    nicotine (Nicoderm CQ) 21 mg/24 hr patch, Place 1 patch over 24 hours on the skin once daily. Do not start before 2024., Disp: 30 patch, Rfl: 0    oxyCODONE-acetaminophen (Percocet) 5-325 mg tablet, Take 1 tablet by mouth every 6 hours if needed for severe pain (7 - 10)., Disp: 12 tablet, Rfl: 0  No current facility-administered medications for this visit.   Allergies   Allergen Reactions    Amoxicillin Other     shakiness        ROS  General: negative for fever, chills, weight loss, night sweat  Head: negative for severe headache, vision change, blurred vision,   CV: negative for chest pain, dizziness, lightheadedness   Pulm: negative for shortness of breath, dyspnea on exertion, hemoptysis  GI: negative for diarrhea, constipation, abdominal pain, nausea or vomiting, BRBPR  :  negative for dysuria, hematuria, incontinence  Skin: negative for rash  Heme: negative for blood thinner, bleeding disorder or clotting disorder  Endo: negative for heat or cold intolerance, weight gain or weight loss  MSK: negative for rash, edema, weakness    PHYSICAL EXAM  There were no vitals taken for this visit.  Constitution: well-developed well-nourished male sitting in chair in no acute distress  HEENT: NCAT, moist mucosal membrane, neck supple, no crepitus, sclera anicteric  Lymph nodes: no cervical or supraclavicular lymphadenopathy  Cardiac: RRR, normal S1, S2, no mrg  Pulmonary: normal air movement, CTAP, no wcr.  Chest wall incision packed with clean dressing  Abdomen: soft, non-distended, non-tender, no rigidity, guarding or rebound tenderness, no splenohepatomegaly  Neuro: AOx3, CNII-XII grossly normal  Ext: warm, dry, no edema noted  Skin: dry, clean and intact  Psych: mood and affect wnl    Assessment and Plan:  This is a 43 y.o. male heavy smoker with lung nodules  I reviewed the CT scan from 1/19/24, 2/18/24 and 3/2/24 the PET/CT from 3/21/24. It showed multiple lung nodules in the right upper middle and lower lobe.  There is a right pleural effusions and right chest wall abscesses.  This nodules have been progressively getting smaller in size.  On the PET scan it showed persistent right middle lobe nodule with hypermetabolic activity with SUV of 3.2.  There is still some activity in the chest wall abscess and perihepatic abscesses.  I reviewed his MRI brain from 3/4/24. It showed no metastatic disease.   I reviewed the surgical pathology from right chest wall debridement and perihepatic abscess aspiration from 3/4/24. No malignant cells were identified but reactive inflammatory cells.     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  He does have persistent right middle lobe nodule with  hypermetabolic activity and smaller RUL nodules with mild activity.  He is still getting IV abx for treatment of his infection. At this point, it is reasonable to have him fully recovered from his chest wall infection before proceed with biopsy.  Especially all surgical biopsy has come back negative for malignancy and all the nodule has been slowly shrinking in size.  My suspicious is that more likely infectious process than malignancy.  However given his smoking history and family history, I will closely follow him with a 1 month CAT scan.  If his right middle lobe nodule persists, will arrange for CT-guided biopsy of nodule.    He endorsed he wanted some help for smoking cessation.  I will refer him to smoking consider consultation and prescribe him Chantix to help.  He denies any history of suicidal ideation or depression.    I outlined the treatment plan as above. The patient consented to proceed.     Ileana Fritz MD  Thoracic Surgeon  [facility]   of Medicine  [facility]  Office phone: [phone]  Fax: [phone]  Pager: [phone]

## 2024-03-22 NOTE — LETTER
2024     Kinsey Chauhan DO  [address] [facility] [address]    Patient: Ritesh Luther   YOB: 1980   Date of Visit: 3/22/2024       Dear Dr. Kinsey Chauhan DO:    Thank you for referring Ritesh Luther to me for evaluation. Below are my notes for this consultation.  If you have questions, please do not hesitate to call me. I look forward to following your patient along with you.       Sincerely,     Ileana Fritz MD      CC: No Recipients  ______________________________________________________________________________________    HPI:   Ritesh Luther is a 43 y.o. male with a pmhx of heavy smoking, lung nodules and chest wall abscess who presents today for evaluation and treatment of lung nodules.  I saw him initially inpatient while he will has chest wall infections.  He underwent I&D of the chest wall infection by general surgery.  He also have CT-guided perihepatic abscess aspiration by IR at the time.  He has recovered from that he still has a wound in the right chest required packing and dressing change.  Overall he feels better and he has gained some weight.  He is due antibiotics per ID.  He is a very anxious with family and recent illness.  He has tried to cut down his smoking from 1 pack a day to 4-5 cigarettes a day.    PMHx: per HPI  PSHx: Chest wall abscess I&D.  SHx: current smoker (20ppy), deny ETOH, or illicit drugs   FMHx: father  of lung cancer    Current Outpatient Medications:   •  cefTRIAXone (Rocephin) 2 gram/50 mL IV, Infuse 50 mL (2 g) at 100 mL/hr over 30 minutes into a venous catheter once every 24 hours., Disp: , Rfl:   •  lactobacillus acidophilus tablet tablet, Take 1 tablet by mouth 3 times a day., Disp: 90 tablet, Rfl: 0  •  nicotine (Nicoderm CQ) 21 mg/24 hr patch, Place 1 patch over 24 hours on the skin once daily. Do not start before 2024., Disp: 30 patch, Rfl: 0  •  oxyCODONE-acetaminophen (Percocet) 5-325 mg tablet, Take  1 tablet by mouth every 6 hours if needed for severe pain (7 - 10)., Disp: 12 tablet, Rfl: 0  No current facility-administered medications for this visit.   Allergies   Allergen Reactions   • Amoxicillin Other     shakiness        ROS  General: negative for fever, chills, weight loss, night sweat  Head: negative for severe headache, vision change, blurred vision,   CV: negative for chest pain, dizziness, lightheadedness   Pulm: negative for shortness of breath, dyspnea on exertion, hemoptysis  GI: negative for diarrhea, constipation, abdominal pain, nausea or vomiting, BRBPR  : negative for dysuria, hematuria, incontinence  Skin: negative for rash  Heme: negative for blood thinner, bleeding disorder or clotting disorder  Endo: negative for heat or cold intolerance, weight gain or weight loss  MSK: negative for rash, edema, weakness    PHYSICAL EXAM  There were no vitals taken for this visit.  Constitution: well-developed well-nourished male sitting in chair in no acute distress  HEENT: NCAT, moist mucosal membrane, neck supple, no crepitus, sclera anicteric  Lymph nodes: no cervical or supraclavicular lymphadenopathy  Cardiac: RRR, normal S1, S2, no mrg  Pulmonary: normal air movement, CTAP, no wcr.  Chest wall incision packed with clean dressing  Abdomen: soft, non-distended, non-tender, no rigidity, guarding or rebound tenderness, no splenohepatomegaly  Neuro: AOx3, CNII-XII grossly normal  Ext: warm, dry, no edema noted  Skin: dry, clean and intact  Psych: mood and affect wnl    Assessment and Plan:  This is a 43 y.o. male heavy smoker with lung nodules  I reviewed the CT scan from 1/19/24, 2/18/24 and 3/2/24 the PET/CT from 3/21/24. It showed multiple lung nodules in the right upper middle and lower lobe.  There is a right pleural effusions and right chest wall abscesses.  This nodules have been progressively getting smaller in size.  On the PET scan it showed persistent right middle lobe nodule with  hypermetabolic activity with SUV of 3.2.  There is still some activity in the chest wall abscess and perihepatic abscesses.  I reviewed his MRI brain from 3/4/24. It showed no metastatic disease.   I reviewed the surgical pathology from right chest wall debridement and perihepatic abscess aspiration from 3/4/24. No malignant cells were identified but reactive inflammatory cells.     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  He does have persistent right middle lobe nodule with hypermetabolic activity and smaller RUL nodules with mild activity.  He is still getting IV abx for treatment of his infection. At this point, it is reasonable to have him fully recovered from his chest wall infection before proceed with biopsy.  Especially all surgical biopsy has come back negative for malignancy and all the nodule has been slowly shrinking in size.  My suspicious is that more likely infectious process than malignancy.  However given his smoking history and family history, I will closely follow him with a 1 month CAT scan.  If his right middle lobe nodule persists, will arrange for CT-guided biopsy of nodule.    He endorsed he wanted some help for smoking cessation.  I will refer him to smoking consider consultation and prescribe him Chantix to help.  He denies any history of suicidal ideation or depression.    I outlined the treatment plan as above. The patient consented to proceed.     Ileana Fritz MD  Thoracic Surgeon  [facility]   of Medicine  [facility]  Office phone: [phone]  Fax: [phone]  Pager: [phone]

## 2024-03-22 NOTE — PROGRESS NOTES
Subjective    Ritesh Luther is a 43 y.o. male who presents for New Patient Visit.  HPI  To establish care reema Cevallos  Beginning of November had pneumonia.  Then mass started developing on his side. Also found nodules on right lung.  The mass was getting larger on the right side. It was painful. In March it ruptured he was hospitalized.  Needs pain management.   Recent surgery in March.   Has been in and out of the hospital.   Dr. Hill for infectious disease  [facility] for oncology.   Had PET Scan yesterday  He smokes.   He has no constipation or problems with the pain meds  He has no other medical problems.   He has no fever   Has had weight loss    Review of Systems   All other systems reviewed and are negative.        Objective     /70 (BP Location: Left arm, Patient Position: Sitting, BP Cuff Size: Adult)   Pulse 58   Temp 36.3 °C (97.3 °F) (Skin)   Resp 14   Ht 1.829 m (6')   Wt 71.2 kg (157 lb)   SpO2 98%   BMI 21.29 kg/m²    Physical Exam  Vitals reviewed.   Constitutional:       General: He is not in acute distress.     Appearance: Normal appearance.   Cardiovascular:      Rate and Rhythm: Normal rate and regular rhythm.      Pulses: Normal pulses.      Heart sounds: Normal heart sounds.   Pulmonary:      Effort: Pulmonary effort is normal.      Breath sounds: Normal breath sounds.   Abdominal:      Tenderness: There is no abdominal tenderness.   Musculoskeletal:         General: No swelling.   Skin:     General: Skin is warm and dry.      Comments: Right sided wound has beefy redness and no draiange   Neurological:      Mental Status: He is alert.       Health Maintenance Due   Topic Date Due    Yearly Adult Physical  Never done    Hepatitis B Vaccines (1 of 3 - 3-dose series) Never done    Lipid Panel  Never done    Colorectal Cancer Screening  Never done    COVID-19 Vaccine (1) Never done    MMR Vaccines (1 of 1 - Standard series) Never done    Pneumococcal Vaccine: Pediatrics (0 to 5  Years) and At-Risk Patients (6 to 64 Years) (1 - PCV) Never done    Hepatitis C Screening  Never done    Influenza Vaccine (1) Never done          Assessment/Plan   Problem List Items Addressed This Visit       Chest wall mass - Primary    Relevant Medications    oxyCODONE-acetaminophen (Percocet) 5-325 mg tablet     Other Visit Diagnoses       Lung mass        Relevant Medications    oxyCODONE-acetaminophen (Percocet) 5-325 mg tablet        His sx are suspected to be malignancy he has multiple areas of uptake on Pet imagine  His wound is clean and healing  Will refil his pain meds.   Watch for constipation.  He has appt with oncology on Monday  Recommend he increase protein in his diet. He has lost weight.  Call  with any problems or questions.   Follow up in a month or sooner if needed

## 2024-03-25 ENCOUNTER — OFFICE VISIT (OUTPATIENT)
Dept: WOUND CARE | Facility: CLINIC | Age: 44
End: 2024-03-25
Payer: COMMERCIAL

## 2024-03-25 ENCOUNTER — APPOINTMENT (OUTPATIENT)
Dept: HEMATOLOGY/ONCOLOGY | Facility: CLINIC | Age: 44
End: 2024-03-25
Payer: MEDICAID

## 2024-03-25 PROCEDURE — 11043 DBRDMT MUSC&/FSCA 1ST 20/<: CPT | Performed by: PLASTIC SURGERY

## 2024-03-25 PROCEDURE — 11043 DBRDMT MUSC&/FSCA 1ST 20/<: CPT

## 2024-03-26 ENCOUNTER — APPOINTMENT (OUTPATIENT)
Dept: PRIMARY CARE | Facility: CLINIC | Age: 44
End: 2024-03-26
Payer: COMMERCIAL

## 2024-03-26 ENCOUNTER — TELEPHONE (OUTPATIENT)
Dept: HEMATOLOGY/ONCOLOGY | Facility: CLINIC | Age: 44
End: 2024-03-26

## 2024-03-26 NOTE — TELEPHONE ENCOUNTER
Pt wanted to speak to someone in regard to an experience he encountered with being checked in prior to his PET CT. Pt called and provided with the [facility] phone number as well as the email address.

## 2024-04-01 ENCOUNTER — OFFICE VISIT (OUTPATIENT)
Dept: WOUND CARE | Facility: CLINIC | Age: 44
End: 2024-04-01
Payer: COMMERCIAL

## 2024-04-01 PROCEDURE — 11043 DBRDMT MUSC&/FSCA 1ST 20/<: CPT

## 2024-04-01 PROCEDURE — 11043 DBRDMT MUSC&/FSCA 1ST 20/<: CPT | Performed by: PLASTIC SURGERY

## 2024-04-02 DIAGNOSIS — R52 PAIN ASSOCIATED WITH WOUND: Primary | ICD-10-CM

## 2024-04-02 DIAGNOSIS — R22.2 CHEST WALL MASS: ICD-10-CM

## 2024-04-02 DIAGNOSIS — T14.8XXA PAIN ASSOCIATED WITH WOUND: Primary | ICD-10-CM

## 2024-04-02 DIAGNOSIS — R91.8 LUNG MASS: ICD-10-CM

## 2024-04-02 NOTE — TELEPHONE ENCOUNTER
Rx Refill Request Telephone Encounter    Name:  Ritesh Luther  :  [id number]  Medication Name:  oxyCODONE-acetaminophen (Percocet) 5-325 mg tablet             Specific Pharmacy location:    [address] Phone: [phone]   Fax: [phone]        Date of last appointment:  3/22/2024  Date of next appointment:  2024  Best number to reach patient:  [phone]

## 2024-04-03 ENCOUNTER — TELEPHONE (OUTPATIENT)
Dept: SCHEDULING | Age: 44
End: 2024-04-03
Payer: COMMERCIAL

## 2024-04-03 ENCOUNTER — TELEPHONE (OUTPATIENT)
Dept: PRIMARY CARE | Facility: CLINIC | Age: 44
End: 2024-04-03
Payer: COMMERCIAL

## 2024-04-03 RX ORDER — OXYCODONE AND ACETAMINOPHEN 5; 325 MG/1; MG/1
1 TABLET ORAL DAILY PRN
Qty: 30 TABLET | Refills: 0 | OUTPATIENT
Start: 2024-04-03 | End: 2024-05-03

## 2024-04-03 NOTE — TELEPHONE ENCOUNTER
----- Message from Kinsey Chauhan DO sent at 4/3/2024  8:08 AM EDT -----  He has been taking the pain meds not as prescribed. If he is needing that much med I will refer to pain management as I do not write for more than 30 a month

## 2024-04-03 NOTE — TELEPHONE ENCOUNTER
Medication refused due to failing protocol.    Requested Prescriptions   Pending Prescriptions Disp Refills    oxyCODONE-acetaminophen (Percocet) 5-325 mg tablet 30 tablet 0     Sig: Take 1 tablet by mouth once daily as needed for severe pain (7 - 10).       Opioid Analgesics Protocol Failed - 4/2/2024  2:09 PM        Failed - No matching opioid in the past 29 days        Failed - Opioid pain agreement on file in past year        Failed - Urine or saliva toxicology result on file in past 12 months        Passed - Visit with relevant provider in past 3 months     Recent Visits  Date Type Provider Dept   03/22/24 Office Visit DO Jeny Coffman [facility]   Showing recent visits within past 90 days and meeting all other requirements  Future Appointments  No visits were found meeting these conditions.  Showing future appointments within next 0 days and meeting all other requirements            Passed - No Benzodiazepines on active med list       Controlled Substance Protocol Failed - 4/2/2024  2:09 PM        Failed - Urine or saliva toxicology result on file in past 12 months        Failed - Medication not refilled in  past 29 days        Passed - Visit with relevant provider in past 12 months.

## 2024-04-04 ENCOUNTER — OFFICE VISIT (OUTPATIENT)
Dept: PAIN MEDICINE | Facility: CLINIC | Age: 44
End: 2024-04-04
Payer: COMMERCIAL

## 2024-04-04 VITALS
OXYGEN SATURATION: 94 % | SYSTOLIC BLOOD PRESSURE: 118 MMHG | WEIGHT: 160 LBS | RESPIRATION RATE: 18 BRPM | HEART RATE: 81 BPM | BODY MASS INDEX: 21.67 KG/M2 | TEMPERATURE: 98.1 F | DIASTOLIC BLOOD PRESSURE: 72 MMHG | HEIGHT: 72 IN

## 2024-04-04 DIAGNOSIS — R52 PAIN ASSOCIATED WITH WOUND: ICD-10-CM

## 2024-04-04 DIAGNOSIS — T14.8XXA PAIN ASSOCIATED WITH WOUND: ICD-10-CM

## 2024-04-04 DIAGNOSIS — G89.18 POSTOPERATIVE PAIN: Primary | ICD-10-CM

## 2024-04-04 PROCEDURE — 99214 OFFICE O/P EST MOD 30 MIN: CPT | Performed by: ANESTHESIOLOGY

## 2024-04-04 PROCEDURE — 99204 OFFICE O/P NEW MOD 45 MIN: CPT | Performed by: ANESTHESIOLOGY

## 2024-04-04 RX ORDER — AMOXICILLIN 500 MG/1
500 CAPSULE ORAL 3 TIMES DAILY
COMMUNITY

## 2024-04-04 RX ORDER — IBUPROFEN 800 MG/1
TABLET ORAL
Qty: 90 TABLET | Refills: 2 | Status: SHIPPED | OUTPATIENT
Start: 2024-04-04

## 2024-04-04 RX ORDER — GABAPENTIN 300 MG/1
600 CAPSULE ORAL 3 TIMES DAILY
Qty: 180 CAPSULE | Refills: 2 | Status: SHIPPED | OUTPATIENT
Start: 2024-04-04 | End: 2025-04-04

## 2024-04-04 RX ORDER — OXYCODONE HYDROCHLORIDE 5 MG/1
5 TABLET ORAL EVERY 6 HOURS PRN
Qty: 15 TABLET | Refills: 0 | Status: SHIPPED | OUTPATIENT
Start: 2024-04-04 | End: 2024-04-11

## 2024-04-04 SDOH — ECONOMIC STABILITY: FOOD INSECURITY: WITHIN THE PAST 12 MONTHS, THE FOOD YOU BOUGHT JUST DIDN'T LAST AND YOU DIDN'T HAVE MONEY TO GET MORE.: NEVER TRUE

## 2024-04-04 SDOH — ECONOMIC STABILITY: FOOD INSECURITY: WITHIN THE PAST 12 MONTHS, YOU WORRIED THAT YOUR FOOD WOULD RUN OUT BEFORE YOU GOT MONEY TO BUY MORE.: NEVER TRUE

## 2024-04-04 ASSESSMENT — ENCOUNTER SYMPTOMS
ENDOCRINE NEGATIVE: 1
RESPIRATORY NEGATIVE: 1
HEMATOLOGIC/LYMPHATIC NEGATIVE: 1
EYES NEGATIVE: 1
GASTROINTESTINAL NEGATIVE: 1
NEUROLOGICAL NEGATIVE: 1
PSYCHIATRIC NEGATIVE: 1
LOSS OF SENSATION IN FEET: 0
MYALGIAS: 1
DEPRESSION: 0
OCCASIONAL FEELINGS OF UNSTEADINESS: 0
CARDIOVASCULAR NEGATIVE: 1

## 2024-04-04 ASSESSMENT — LIFESTYLE VARIABLES
TOTAL SCORE: 5
AUDIT TOTAL SCORE: 3
HAVE YOU OR SOMEONE ELSE BEEN INJURED AS A RESULT OF YOUR DRINKING: NO
SKIP TO QUESTIONS 9-10: 1
HOW OFTEN DO YOU HAVE SIX OR MORE DRINKS ON ONE OCCASION: NEVER
HOW OFTEN DO YOU HAVE A DRINK CONTAINING ALCOHOL: 2-3 TIMES A WEEK
HOW OFTEN DURING THE LAST YEAR HAVE YOU FOUND THAT YOU WERE NOT ABLE TO STOP DRINKING ONCE YOU HAD STARTED: NEVER
HOW OFTEN DURING THE LAST YEAR HAVE YOU NEEDED AN ALCOHOLIC DRINK FIRST THING IN THE MORNING TO GET YOURSELF GOING AFTER A NIGHT OF HEAVY DRINKING: NEVER
HOW OFTEN DURING THE LAST YEAR HAVE YOU BEEN UNABLE TO REMEMBER WHAT HAPPENED THE NIGHT BEFORE BECAUSE YOU HAD BEEN DRINKING: NEVER
AUDIT-C TOTAL SCORE: 3
HAS A RELATIVE, FRIEND, DOCTOR, OR ANOTHER HEALTH PROFESSIONAL EXPRESSED CONCERN ABOUT YOUR DRINKING OR SUGGESTED YOU CUT DOWN: NO
HOW MANY STANDARD DRINKS CONTAINING ALCOHOL DO YOU HAVE ON A TYPICAL DAY: 1 OR 2
HOW OFTEN DURING THE LAST YEAR HAVE YOU HAD A FEELING OF GUILT OR REMORSE AFTER DRINKING: NEVER
HOW OFTEN DURING THE LAST YEAR HAVE YOU FAILED TO DO WHAT WAS NORMALLY EXPECTED FROM YOU BECAUSE OF DRINKING: NEVER

## 2024-04-04 ASSESSMENT — PAIN SCALES - GENERAL
PAINLEVEL: 4
PAINLEVEL_OUTOF10: 4

## 2024-04-04 ASSESSMENT — PAIN DESCRIPTION - DESCRIPTORS: DESCRIPTORS: OTHER (COMMENT)

## 2024-04-04 ASSESSMENT — PATIENT HEALTH QUESTIONNAIRE - PHQ9
SUM OF ALL RESPONSES TO PHQ9 QUESTIONS 1 AND 2: 0
2. FEELING DOWN, DEPRESSED OR HOPELESS: NOT AT ALL
1. LITTLE INTEREST OR PLEASURE IN DOING THINGS: NOT AT ALL

## 2024-04-04 ASSESSMENT — PAIN - FUNCTIONAL ASSESSMENT: PAIN_FUNCTIONAL_ASSESSMENT: 0-10

## 2024-04-04 NOTE — PROGRESS NOTES
No chief complaint on file.    History of Present Complaint:  The patient was referred to us by Referring Provider: Kinsey Chauhan DO . this is 43 y.o.  male  with a past history of heavy smoking with lung abscess and open chest wound and perihepatic abscess that drained with pain on that side and anxiety sent by PCP for pain management. Patient cannot stop smoking thoracic surgery think he is very anxious.  Only on very few oxycodone since December with no adjuvant therapy  presenting with  right sided chest wall    Pain started 12/2023 after having pneumonia .    Pain is always better first thing in the morning.  Pain is worse when he is doing daily activities especially taking care of his 4 young children.    The pain is described as  irritating  and is relieved by Medications percocet however has family PCP will not continue to prescribe if she does not feel comfortable.      Prior Pain Therapies: chronic opioid therapy    Past surgical history:   Right-sided chest wall surgery for mass removal, and left hand surgery.      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    Social history: , Has 6children, 0grandchildren and 0great-grandchildren, Higher education GED, Smoker, and Marijuana user, Has an associates degree in business management.    Diagnostic studies:  PET scan of the lungs MRI of the brain CT of the abdomen chest chest x-rays    Opioid Risk Assessment Score 5/26

## 2024-04-04 NOTE — PROGRESS NOTES
History of Present Complaint:  The patient was referred to us by Referring Provider: Kinsey Chauhan DO . this is 43 y.o.  male with a past history of heavy smoking with lung abscess and open chest wound and perihepatic abscess that drained with pain on that side and anxiety sent by PCP for pain management. Patient cannot stop smoking thoracic surgery think he is very anxious.  Only on very few oxycodone since December with no adjuvant therapy presenting with chest wall empyema with perihepatic abscess drainage and chest wall acute postoperative pain that sent to pain management for really bizarre reason stating that the PCP uncomfortable to prescribe pain medication for him.  I do not see it is the role of the PCP to prescribe pain medication but the role of the surgical team should be prescribing pain medication for him.  Since my back ground of cardiothoracic surgery I am going to help this gentleman he needs a multimodal approach to treat his pain.  I am going to start him on combination of ibuprofen Tylenol and gabapentin to control his moderate to severe pain with oxycodone for intractable severe pain if needed.  We discussed the dosage of the gabapentin with the adjustment to be titrated.          I have personally reviewed the OARRS report for this patient. This report is scanned into the electronic medical record. I have considered the risks of abuse, dependence, addiction and diversion. 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     Eyes: Negative.    Respiratory: Negative.     Cardiovascular: Negative.    Gastrointestinal: Negative.    Endocrine: Negative.    Genitourinary: Negative.    Musculoskeletal:  Positive for myalgias.        Chest wall pain   Skin: Negative.    Neurological: Negative.    Hematological: Negative.    Psychiatric/Behavioral: Negative.            Physical Exam  Vitals and nursing note reviewed.   Constitutional:       Appearance: Normal appearance.   HENT:      Head: Normocephalic and atraumatic.      Nose: Nose normal.   Eyes:      Extraocular Movements: Extraocular movements intact.      Conjunctiva/sclera: Conjunctivae normal.      Pupils: Pupils are equal, round, and reactive to light.   Cardiovascular:      Rate and Rhythm: Normal rate and regular rhythm.      Pulses: Normal pulses.      Heart sounds: Normal heart sounds.   Pulmonary:      Effort: Pulmonary effort is normal.      Breath sounds: Normal breath sounds.   Abdominal:      General: Abdomen is flat. Bowel sounds are normal.      Palpations: Abdomen is soft.   Skin:     General: Skin is warm.   Neurological:      General: No focal deficit present.      Mental Status: He is alert and oriented to person, place, and time.   Psychiatric:         Mood and Affect: Mood normal.         Behavior: Behavior normal.            Assessment  Pleasant 43 years old with significant right-sided chest wall pain as a result of empyema/abscess with open wound and still acute pain that mismanaged by the primary services and sent to pain management!!  Patient needs multimodal therapy.  Will start the patient on ibuprofen in combination with acetaminophen and gabapentin in addition to oxycodone for severe pain.  Will adjust gabapentin to titration.  The patient can continue with his primary service care.  He really does not need a pain management to control his acute  pain and I hope one of his surgical or infectious disease team or even his primary care will continue with his therapy.           Plan  At least 50% of the visit was involved in the discussion of the options for treatment. We discussed exercises, medication, interventional therapies and surgery. Healthy life style is essential with patient hard work to achieve the wellness. In addition; discussion with the patient and/or family about any of the diagnostic results, impressions and/or recommended diagnostic studies, prognosis, risks and benefits of treatment options, instructions for treatment and/or follow-up, importance of compliance with chosen treatment options, risk-factor reduction, and patient/family education.         Pool therapy, walking in the pool, at least 3x per week for 30 minutes  Smoking cessation  Ibuprofen 800 mg with acetaminophen at 1000 mg 3 times daily with gabapentin 300 to 600 mg  Oxycodone 5 mg for severe pain added to the above  Primary service to take over the management of his acute postoperative pain  Healthy lifestyle and anti-inflammatory diet in addition to weight control discussed with the patient  Alternative chronic pain therapies was discussed, encouraged and information was handed  Return to Clinic 3 months       *Please note this report has been produced using speech recognition software and may contain errors related to that system including grammar, punctuation and spelling as well as words and phrases that may be inappropriate. If there are questions or concerns, please feel free to contact me to clarify.    Isidro Monique MD

## 2024-04-04 NOTE — LETTER
April 4, 2024     Kinsey Chauhan DO  [address]  [facility] [place]  [facility] [address]    Patient: Ritesh Luther   YOB: 1980   Date of Visit: 4/4/2024       Dear Dr. Kinsey Chauhan DO:    Thank you for referring Ritesh Luther to me for evaluation. Below are my notes for this consultation.  If you have questions, please do not hesitate to call me. I look forward to following your patient along with you.       Sincerely,     Isidro Monique MD      CC: No Recipients  ______________________________________________________________________________________      History of Present Complaint:  The patient was referred to us by Referring Provider: Kinsey Chauhan DO . this is 43 y.o.  male with a past history of heavy smoking with lung abscess and open chest wound and perihepatic abscess that drained with pain on that side and anxiety sent by PCP for pain management. Patient cannot stop smoking thoracic surgery think he is very anxious.  Only on very few oxycodone since December with no adjuvant therapy presenting with chest wall empyema with perihepatic abscess drainage and chest wall acute postoperative pain that sent to pain management for really bizarre reason stating that the PCP uncomfortable to prescribe pain medication for him.  I do not see it is the role of the PCP to prescribe pain medication but the role of the surgical team should be prescribing pain medication for him.  Since my back ground of cardiothoracic surgery I am going to help this gentleman he needs a multimodal approach to treat his pain.  I am going to start him on combination of ibuprofen Tylenol and gabapentin to control his moderate to severe pain with oxycodone for intractable severe pain if needed.  We discussed the dosage of the gabapentin with the adjustment to be titrated.          I have personally reviewed the OARRS report for this patient. This report is scanned into the electronic medical record. I have  considered the risks of abuse, dependence, addiction and diversion. 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     Eyes: Negative.    Respiratory: Negative.     Cardiovascular: Negative.    Gastrointestinal: Negative.    Endocrine: Negative.    Genitourinary: Negative.    Musculoskeletal:  Positive for myalgias.        Chest wall pain   Skin: Negative.    Neurological: Negative.    Hematological: Negative.    Psychiatric/Behavioral: Negative.            Physical Exam  Vitals and nursing note reviewed.   Constitutional:       Appearance: Normal appearance.   HENT:      Head: Normocephalic and atraumatic.      Nose: Nose normal.   Eyes:      Extraocular Movements: Extraocular movements intact.      Conjunctiva/sclera: Conjunctivae normal.      Pupils: Pupils are equal, round, and reactive to light.   Cardiovascular:      Rate and Rhythm: Normal rate and regular rhythm.      Pulses: Normal pulses.      Heart sounds: Normal heart sounds.   Pulmonary:      Effort: Pulmonary effort is normal.      Breath sounds: Normal breath sounds.   Abdominal:      General: Abdomen is flat. Bowel sounds are normal.      Palpations: Abdomen is soft.   Skin:     General: Skin is warm.   Neurological:      General: No focal deficit present.      Mental Status: He is alert and oriented to person, place, and time.   Psychiatric:         Mood and Affect: Mood  normal.         Behavior: Behavior normal.            Assessment  Pleasant 43 years old with significant right-sided chest wall pain as a result of empyema/abscess with open wound and still acute pain that mismanaged by the primary services and sent to pain management!!  Patient needs multimodal therapy.  Will start the patient on ibuprofen in combination with acetaminophen and gabapentin in addition to oxycodone for severe pain.  Will adjust gabapentin to titration.  The patient can continue with his primary service care.  He really does not need a pain management to control his acute pain and I hope one of his surgical or infectious disease team or even his primary care will continue with his therapy.           Plan  At least 50% of the visit was involved in the discussion of the options for treatment. We discussed exercises, medication, interventional therapies and surgery. Healthy life style is essential with patient hard work to achieve the wellness. In addition; discussion with the patient and/or family about any of the diagnostic results, impressions and/or recommended diagnostic studies, prognosis, risks and benefits of treatment options, instructions for treatment and/or follow-up, importance of compliance with chosen treatment options, risk-factor reduction, and patient/family education.         Pool therapy, walking in the pool, at least 3x per week for 30 minutes  Smoking cessation  Ibuprofen 800 mg with acetaminophen at 1000 mg 3 times daily with gabapentin 300 to 600 mg  Oxycodone 5 mg for severe pain added to the above  Primary service to take over the management of his acute postoperative pain  Healthy lifestyle and anti-inflammatory diet in addition to weight control discussed with the patient  Alternative chronic pain therapies was discussed, encouraged and information was handed  Return to Clinic 3 months       *Please note this report has been produced using speech recognition software and may  contain errors related to that system including grammar, punctuation and spelling as well as words and phrases that may be inappropriate. If there are questions or concerns, please feel free to contact me to clarify.    Isidro Monique MD

## 2024-04-06 ENCOUNTER — PHARMACY VISIT (OUTPATIENT)
Dept: PHARMACY | Facility: CLINIC | Age: 44
End: 2024-04-06
Payer: MEDICAID

## 2024-04-06 PROCEDURE — RXMED WILLOW AMBULATORY MEDICATION CHARGE

## 2024-04-10 ENCOUNTER — TELEPHONE (OUTPATIENT)
Dept: PRIMARY CARE | Facility: CLINIC | Age: 44
End: 2024-04-10
Payer: COMMERCIAL

## 2024-04-10 NOTE — TELEPHONE ENCOUNTER
----- Message from Kinsey Chauhan DO sent at 4/9/2024 10:35 AM EDT -----  Has he had a follow up with infectious disease. I do not see one  recently

## 2024-04-15 ENCOUNTER — APPOINTMENT (OUTPATIENT)
Dept: WOUND CARE | Facility: CLINIC | Age: 44
End: 2024-04-15
Payer: COMMERCIAL

## 2024-04-15 ENCOUNTER — OFFICE VISIT (OUTPATIENT)
Dept: INFECTIOUS DISEASES | Age: 44
End: 2024-04-15
Payer: COMMERCIAL

## 2024-04-15 VITALS
DIASTOLIC BLOOD PRESSURE: 76 MMHG | SYSTOLIC BLOOD PRESSURE: 114 MMHG | HEART RATE: 72 BPM | WEIGHT: 168 LBS | HEIGHT: 72 IN | OXYGEN SATURATION: 98 % | TEMPERATURE: 97.6 F | BODY MASS INDEX: 22.75 KG/M2 | RESPIRATION RATE: 14 BRPM

## 2024-04-15 DIAGNOSIS — L02.213 CHEST WALL ABSCESS: Primary | ICD-10-CM

## 2024-04-15 PROCEDURE — G8427 DOCREV CUR MEDS BY ELIG CLIN: HCPCS | Performed by: INTERNAL MEDICINE

## 2024-04-15 PROCEDURE — G8420 CALC BMI NORM PARAMETERS: HCPCS | Performed by: INTERNAL MEDICINE

## 2024-04-15 PROCEDURE — 4004F PT TOBACCO SCREEN RCVD TLK: CPT | Performed by: INTERNAL MEDICINE

## 2024-04-15 PROCEDURE — 99213 OFFICE O/P EST LOW 20 MIN: CPT | Performed by: INTERNAL MEDICINE

## 2024-04-15 RX ORDER — AMOXICILLIN 500 MG/1
500 CAPSULE ORAL 3 TIMES DAILY
Qty: 90 CAPSULE | Refills: 0 | Status: SHIPPED | OUTPATIENT
Start: 2024-04-15 | End: 2024-05-15

## 2024-04-15 ASSESSMENT — PATIENT HEALTH QUESTIONNAIRE - PHQ9
SUM OF ALL RESPONSES TO PHQ9 QUESTIONS 1 & 2: 0
SUM OF ALL RESPONSES TO PHQ QUESTIONS 1-9: 0
2. FEELING DOWN, DEPRESSED OR HOPELESS: NOT AT ALL
1. LITTLE INTEREST OR PLEASURE IN DOING THINGS: NOT AT ALL

## 2024-04-15 NOTE — PROGRESS NOTES
eJremy Bustamante (:  1980) is a 43 y.o. male,Established patient, here for evaluation of the following chief complaint(s):  Follow-up (R side thoracic wound)         ASSESSMENT/PLAN:  Right wrist abscess, possible actinomycosis  Right chest wound with progressive healing    Refill amoxicillin 500 mg p.o. 3 times daily to next visit  Follow-up in 4 weeks  Continue local wound care  Encourage smoking cessation    Subjective   SUBJECTIVE/OBJECTIVE:  HPI  Follow-up right chest wall abscess status post I&D 2 weeks ago, status post CT-guided drainage 4 weeks ago for which she received 2 weeks course of IV Rocephin followed by p.o. amoxicillin, well-tolerated.   Right chest wound with progressive healing.  Minimal drainage.   No chest pain.  Smoker's cough  Plan to stop smoking  No fevers or chills  No skin rash  Starting to put on weight that he lost during the infectious process  Reports good appetite  Asking about lactobacillus of which he ran out.  No GI symptoms  Review of Systems   All other systems reviewed and are negative.       Objective   Physical Exam     Vitals:    04/15/24 1022   BP: 114/76   Site: Right Upper Arm   Position: Sitting   Cuff Size: Large Adult   Pulse: 72   Resp: 14   Temp: 97.6 °F (36.4 °C)   TempSrc: Temporal   SpO2: 98%   Weight: 76.2 kg (168 lb)   Height: 1.829 m (6')     General Appearance: alert and oriented to person, place and time, well-developed and well-nourished, in no acute distress  Skin: warm and dry, no rash.   Head: normocephalic and atraumatic  Eyes: anicteric sclerae  ENT:  normal mucous membranes. No oral thrush  No lymphadenopathy  Lungs: normal respiratory effort, clear lungs  Heart normal S1-S2 no murmur  Right chest wound with 100% granulation tissue, measures approximately 3 cm x 2-1/2 cm, 1 mm depth.  No drainage or fluctuance.  No surrounding erythema or tenderness  Abdomen: soft, no tenderness  No leg edema  No erythema, no tenderness            An

## 2024-04-17 ENCOUNTER — OFFICE VISIT (OUTPATIENT)
Dept: WOUND CARE | Facility: CLINIC | Age: 44
End: 2024-04-17
Payer: COMMERCIAL

## 2024-04-17 PROCEDURE — 11042 DBRDMT SUBQ TIS 1ST 20SQCM/<: CPT

## 2024-04-22 ENCOUNTER — TELEPHONE (OUTPATIENT)
Dept: PRIMARY CARE | Facility: CLINIC | Age: 44
End: 2024-04-22
Payer: COMMERCIAL

## 2024-04-22 NOTE — TELEPHONE ENCOUNTER
He missed a court date due to his infection on his side. So the court wants paperwork stating that he missed due to his illness and stating that he is not medically able to work right now. Is that something that can be done? Please contact pt.

## 2024-04-24 ENCOUNTER — APPOINTMENT (OUTPATIENT)
Dept: WOUND CARE | Facility: CLINIC | Age: 44
End: 2024-04-24
Payer: COMMERCIAL

## 2024-04-25 ENCOUNTER — OFFICE VISIT (OUTPATIENT)
Dept: PRIMARY CARE | Facility: CLINIC | Age: 44
End: 2024-04-25
Payer: COMMERCIAL

## 2024-04-25 VITALS
HEART RATE: 63 BPM | OXYGEN SATURATION: 97 % | DIASTOLIC BLOOD PRESSURE: 60 MMHG | RESPIRATION RATE: 14 BRPM | TEMPERATURE: 98 F | WEIGHT: 168 LBS | HEIGHT: 72 IN | SYSTOLIC BLOOD PRESSURE: 110 MMHG | BODY MASS INDEX: 22.75 KG/M2

## 2024-04-25 DIAGNOSIS — R19.7 DIARRHEA, UNSPECIFIED TYPE: ICD-10-CM

## 2024-04-25 DIAGNOSIS — K75.0 LIVER ABSCESS (HHS-HCC): ICD-10-CM

## 2024-04-25 DIAGNOSIS — R19.7 DIARRHEA, UNSPECIFIED TYPE: Primary | ICD-10-CM

## 2024-04-25 DIAGNOSIS — R91.8 LUNG MASS: ICD-10-CM

## 2024-04-25 DIAGNOSIS — G47.00 INSOMNIA, UNSPECIFIED TYPE: ICD-10-CM

## 2024-04-25 PROCEDURE — 99214 OFFICE O/P EST MOD 30 MIN: CPT | Performed by: INTERNAL MEDICINE

## 2024-04-25 RX ORDER — BUTYROSPERMUM PARKII(SHEA BUTTER), SIMMONDSIA CHINENSIS (JOJOBA) SEED OIL, ALOE BARBADENSIS LEAF EXTRACT .01; 1; 3.5 G/100G; G/100G; G/100G
250 LIQUID TOPICAL 2 TIMES DAILY
Qty: 60 CAPSULE | Refills: 3 | Status: SHIPPED | OUTPATIENT
Start: 2024-04-25 | End: 2024-08-23

## 2024-04-25 RX ORDER — BUTYROSPERMUM PARKII(SHEA BUTTER), SIMMONDSIA CHINENSIS (JOJOBA) SEED OIL, ALOE BARBADENSIS LEAF EXTRACT .01; 1; 3.5 G/100G; G/100G; G/100G
250 LIQUID TOPICAL 2 TIMES DAILY
COMMUNITY
End: 2024-04-25 | Stop reason: SDUPTHER

## 2024-04-25 RX ORDER — TRAZODONE HYDROCHLORIDE 50 MG/1
50 TABLET ORAL NIGHTLY PRN
Qty: 30 TABLET | Refills: 5 | Status: SHIPPED | OUTPATIENT
Start: 2024-04-25 | End: 2025-04-25

## 2024-04-25 NOTE — TELEPHONE ENCOUNTER
[facility]   Received rx for probiotic. Unable to find a match.   Can he have any type of probiotic/lactobacillus?   [phone]

## 2024-04-25 NOTE — PROGRESS NOTES
Subjective    Ritesh Luther is a 43 y.o. male who presents for Follow-up.  HPI    1 month follow   Scheduled for CT tomorrow  No definitive diagnosis yet     Currently on an abx amox. Would like amox removed from allergy list   He gets diarrhea from his abx. He  was on a probiotic and that seemed to help but he ran out.    His pain is controlled his wound is healed.   He does not sleep well at night  lots of stress.    Review of Systems   All other systems reviewed and are negative.        Objective     /60 (BP Location: Left arm, Patient Position: Sitting, BP Cuff Size: Adult)   Pulse 63   Temp 36.7 °C (98 °F) (Skin)   Resp 14   Ht 1.829 m (6')   Wt 76.2 kg (168 lb)   SpO2 97%   BMI 22.78 kg/m²    Physical Exam  Vitals reviewed.   Constitutional:       General: He is not in acute distress.     Appearance: Normal appearance.   Cardiovascular:      Rate and Rhythm: Normal rate and regular rhythm.      Pulses: Normal pulses.      Heart sounds: Normal heart sounds.   Pulmonary:      Effort: Pulmonary effort is normal.      Breath sounds: Normal breath sounds.   Abdominal:      Tenderness: There is no abdominal tenderness.      Comments: Wound is healed   Musculoskeletal:         General: No swelling.   Skin:     General: Skin is warm and dry.   Neurological:      Mental Status: He is alert.       Health Maintenance Due   Topic Date Due    Yearly Adult Physical  Never done    Lipid Panel  Never done    Thyroglobulin Test  Never done    Colorectal Cancer Screening  Never done    MMR Vaccines (1 of 1 - Standard series) Never done    Pneumococcal Vaccine: Pediatrics (0 to 5 Years) and At-Risk Patients (6 to 64 Years) (1 of 2 - PCV) Never done    Hepatitis C Screening  Never done    Hepatitis B Vaccines (1 of 3 - 19+ 3-dose series) Never done    Hepatitis A Vaccines (1 of 2 - Risk 2-dose series) Never done    COVID-19 Vaccine (1 - 2023-24 season) Never done          Assessment/Plan   Problem List Items  Addressed This Visit       Wound infection    Lung mass     Other Visit Diagnoses       Diarrhea, unspecified type    -  Primary    Relevant Medications    Lacto no.51-Wymmbr-UYP-larch 25B cell-25B cell-50 mg capsule    Insomnia, unspecified type        Relevant Medications    traZODone (Desyrel) 50 mg tablet        He is following  with ct tomorrow. Cont abx.   Will try trazodone.   Needs to make appt with ID. He is continuing abx.  Call  with any problems or questions.   Follow up in three months.

## 2024-04-25 NOTE — PROGRESS NOTES
Subjective   Ritesh Luther  is a 43 y.o. male with a pmhx of heavy smoking, lung nodules and chest wall abscess who presents today for follow up. He is doing well. His energy is back and he is playing with his kids without SOB or dyspnea. His wound is healing well. He continues to work on smoking cessation. Chantix doesn't work well for him and he has to stop it. He is using some gums right now.    There is no significant change in patient's past medical history, past surgical history, social history, family history, or review of systems since last visit.     Objective   There were no vitals taken for this visit.  Physical Exam  Constitutional:       General: He is not in acute distress.     Appearance: Normal appearance. He is not ill-appearing, toxic-appearing or diaphoretic.   HENT:      Head: Normocephalic and atraumatic.      Mouth/Throat:      Mouth: Mucous membranes are moist.      Pharynx: Oropharynx is clear.   Eyes:      General: No scleral icterus.     Extraocular Movements: Extraocular movements intact.      Conjunctiva/sclera: Conjunctivae normal.      Pupils: Pupils are equal, round, and reactive to light.   Cardiovascular:      Rate and Rhythm: Normal rate and regular rhythm.      Pulses: Normal pulses.      Heart sounds: Normal heart sounds.   Pulmonary:      Effort: Pulmonary effort is normal. No respiratory distress.      Breath sounds: Normal breath sounds. No stridor. No wheezing, rhonchi or rales.      Comments: Well healed right chest wound with granulation tissue.  Chest:      Chest wall: No tenderness.   Abdominal:      General: There is no distension.      Palpations: Abdomen is soft.      Tenderness: There is no abdominal tenderness. There is no guarding or rebound.   Musculoskeletal:         General: No swelling or tenderness. Normal range of motion.      Cervical back: Normal range of motion and neck supple. No rigidity or tenderness.      Right lower leg: No edema.      Left lower  leg: No edema.   Skin:     General: Skin is warm and dry.      Coloration: Skin is not jaundiced.   Neurological:      General: No focal deficit present.      Mental Status: He is alert and oriented to person, place, and time. Mental status is at baseline.   Psychiatric:         Mood and Affect: Mood normal.         Behavior: Behavior normal.           Diagnostic Review  I reviewed the CT scan from 4/26/24, 1/19/24, 2/18/24 and 3/2/24 the PET/CT from 3/21/24. It showed multiple lung nodules:  -RUL apical elongated nodule continues to shrink in size with well demarcated border  -RML two peripheral nodules smaller in size comparing to prior imaging, haziness improving  -RLL GGO at the base improving  -There is a right pleural effusions and right chest wall abscesses or prior imaging which have resolved.  -On the PET scan it showed mild right middle lobe nodule with hypermetabolic activity with SUV of 3.2.  There is still some activity in the chest wall abscess and perihepatic abscesses.  I reviewed his MRI brain from 3/4/24. It showed no metastatic disease.   I reviewed the surgical pathology from right chest wall debridement and perihepatic abscess aspiration from 3/4/24. No malignant cells were identified but reactive inflammatory cells.     Assessment/Plan   Ritesh Luther  is doing well.  In my opinion, all the nodules continue to reduce in size on the repeat imaging today.  His infection has improved as well.  He feels much better clinically.  At this point I do not think he has malignancy.  All the nodules are likely from empyema and pneumonia.  I do not think he will need biopsy.  I will continue to monitor these nodules with the repeat CAT scan in 3 months.     I prescribe him more nicotine patch for smoking cessation.       Ileana Fritz MD  Thoracic Surgeon  [facility]  [facility]  [place]  Office phone: [phone]  Fax: [phone]  Pager: [phone]

## 2024-04-26 ENCOUNTER — OFFICE VISIT (OUTPATIENT)
Dept: SURGERY | Facility: CLINIC | Age: 44
End: 2024-04-26
Payer: COMMERCIAL

## 2024-04-26 ENCOUNTER — HOSPITAL ENCOUNTER (OUTPATIENT)
Dept: RADIOLOGY | Facility: HOSPITAL | Age: 44
Discharge: HOME | End: 2024-04-26
Payer: COMMERCIAL

## 2024-04-26 VITALS
SYSTOLIC BLOOD PRESSURE: 111 MMHG | BODY MASS INDEX: 22.89 KG/M2 | HEART RATE: 60 BPM | OXYGEN SATURATION: 98 % | TEMPERATURE: 97.7 F | RESPIRATION RATE: 16 BRPM | DIASTOLIC BLOOD PRESSURE: 71 MMHG | HEIGHT: 72 IN | WEIGHT: 169 LBS

## 2024-04-26 DIAGNOSIS — F18.10 ABUSE OF SMOKED SUBSTANCE (MULTI): ICD-10-CM

## 2024-04-26 DIAGNOSIS — R91.1 LUNG NODULE: ICD-10-CM

## 2024-04-26 DIAGNOSIS — R91.8 LUNG NODULES: Primary | ICD-10-CM

## 2024-04-26 PROCEDURE — 99407 BEHAV CHNG SMOKING > 10 MIN: CPT | Performed by: STUDENT IN AN ORGANIZED HEALTH CARE EDUCATION/TRAINING PROGRAM

## 2024-04-26 PROCEDURE — 71250 CT THORAX DX C-: CPT | Performed by: RADIOLOGY

## 2024-04-26 PROCEDURE — 99214 OFFICE O/P EST MOD 30 MIN: CPT | Performed by: STUDENT IN AN ORGANIZED HEALTH CARE EDUCATION/TRAINING PROGRAM

## 2024-04-26 PROCEDURE — 71250 CT THORAX DX C-: CPT

## 2024-04-26 RX ORDER — IBUPROFEN 200 MG
1 TABLET ORAL EVERY 24 HOURS
Qty: 30 PATCH | Refills: 0 | Status: SHIPPED | OUTPATIENT
Start: 2024-04-26 | End: 2024-05-26

## 2024-05-02 ENCOUNTER — APPOINTMENT (OUTPATIENT)
Dept: RADIOLOGY | Facility: CLINIC | Age: 44
End: 2024-05-02
Payer: COMMERCIAL

## 2024-07-26 ENCOUNTER — APPOINTMENT (OUTPATIENT)
Dept: SURGERY | Facility: CLINIC | Age: 44
End: 2024-07-26
Payer: COMMERCIAL

## 2024-07-26 ENCOUNTER — APPOINTMENT (OUTPATIENT)
Dept: PRIMARY CARE | Facility: CLINIC | Age: 44
End: 2024-07-26
Payer: COMMERCIAL

## 2024-07-26 ENCOUNTER — APPOINTMENT (OUTPATIENT)
Dept: RADIOLOGY | Facility: HOSPITAL | Age: 44
End: 2024-07-26
Payer: COMMERCIAL

## 2024-09-06 ENCOUNTER — HOSPITAL ENCOUNTER (OUTPATIENT)
Dept: RADIOLOGY | Facility: HOSPITAL | Age: 44
Discharge: HOME | End: 2024-09-06
Payer: COMMERCIAL

## 2024-09-06 ENCOUNTER — OFFICE VISIT (OUTPATIENT)
Dept: SURGERY | Facility: CLINIC | Age: 44
End: 2024-09-06
Payer: COMMERCIAL

## 2024-09-06 VITALS
DIASTOLIC BLOOD PRESSURE: 78 MMHG | OXYGEN SATURATION: 98 % | TEMPERATURE: 97.6 F | RESPIRATION RATE: 14 BRPM | SYSTOLIC BLOOD PRESSURE: 129 MMHG | WEIGHT: 168 LBS | BODY MASS INDEX: 22.75 KG/M2 | HEART RATE: 56 BPM | HEIGHT: 72 IN

## 2024-09-06 DIAGNOSIS — R91.1 LUNG NODULE: ICD-10-CM

## 2024-09-06 DIAGNOSIS — R91.8 LUNG NODULES: Primary | ICD-10-CM

## 2024-09-06 DIAGNOSIS — R91.8 LUNG NODULES: ICD-10-CM

## 2024-09-06 PROCEDURE — 3008F BODY MASS INDEX DOCD: CPT | Performed by: STUDENT IN AN ORGANIZED HEALTH CARE EDUCATION/TRAINING PROGRAM

## 2024-09-06 PROCEDURE — 99214 OFFICE O/P EST MOD 30 MIN: CPT | Performed by: STUDENT IN AN ORGANIZED HEALTH CARE EDUCATION/TRAINING PROGRAM

## 2024-09-06 PROCEDURE — 71250 CT THORAX DX C-: CPT

## 2024-09-06 NOTE — PROGRESS NOTES
Subjective   Ritesh Luther  is a 43 y.o. male with a pmhx of heavy smoking, lung nodules and chest wall abscess who presents today for lung nodule follow up. He is doing well. His energy is back and he is playing with his kids without SOB or dyspnea. His wound is healing well. He continues to work on smoking cessation. He has lots of stress in life which make smoking cessation difficult. Overall, he is doing well. He denied fever, chills, SOB and PEREIRA.    There is no significant change in patient's past medical history, past surgical history, social history, family history, or review of systems since last visit.     Objective   Visit Vitals  /78   Pulse 56   Temp 36.4 °C (97.6 °F)   Resp 14     Physical Exam  Constitutional:       General: He is not in acute distress.     Appearance: Normal appearance. He is not ill-appearing, toxic-appearing or diaphoretic.   HENT:      Head: Normocephalic and atraumatic.      Mouth/Throat:      Mouth: Mucous membranes are moist.      Pharynx: Oropharynx is clear.   Eyes:      General: No scleral icterus.     Extraocular Movements: Extraocular movements intact.      Conjunctiva/sclera: Conjunctivae normal.      Pupils: Pupils are equal, round, and reactive to light.   Cardiovascular:      Rate and Rhythm: Normal rate and regular rhythm.      Pulses: Normal pulses.      Heart sounds: Normal heart sounds.   Pulmonary:      Effort: Pulmonary effort is normal. No respiratory distress.      Breath sounds: Normal breath sounds. No stridor. No wheezing, rhonchi or rales.      Comments: Chest wall I&D site well healed  Chest:      Chest wall: No tenderness.   Abdominal:      General: There is no distension.      Palpations: Abdomen is soft.      Tenderness: There is no abdominal tenderness. There is no guarding or rebound.   Musculoskeletal:         General: No swelling or tenderness. Normal range of motion.      Cervical back: Normal range of motion and neck supple. No rigidity or  tenderness.      Right lower leg: No edema.      Left lower leg: No edema.   Skin:     General: Skin is warm and dry.      Coloration: Skin is not jaundiced.   Neurological:      General: No focal deficit present.      Mental Status: He is alert and oriented to person, place, and time. Mental status is at baseline.   Psychiatric:         Mood and Affect: Mood normal.         Behavior: Behavior normal.           Diagnostic Review  I reviewed the CT scan from 9/6/24, 4/26/24, 1/19/24, 2/18/24 and 3/2/24 the PET/CT from 3/21/24. It showed multiple lung nodules:  -RUL apical elongated nodule continues to shrink in size now more representing scarring   -RML two peripheral nodules shrinking in size  -RLL GGO at the based reducing in size  -right pleural effusion resolved   -On the PET scan it showed mild right middle lobe nodule with hypermetabolic activity with SUV of 3.2.  There is still some activity in the chest wall abscess and perihepatic abscesses.  I reviewed his MRI brain from 3/4/24. It showed no metastatic disease.   I reviewed the surgical pathology from right chest wall debridement and perihepatic abscess aspiration from 3/4/24. No malignant cells were identified but reactive inflammatory cells.     Assessment/Plan   Ritesh Luther  is doing well.  In my opinion, all the nodules continue to reduce in size and now more present post infectious scars.  Pneumonia has resolved.  He feels at his baseline clinically.  At this point I do not think he has malignancy and he has recovered from pneumonia. He can return to work without restriction. I will follow up with him in 1 year with repeat CT scan.     Ileana Fritz MD  Thoracic Surgeon  [facility]   of Medicine  [facility]  Office phone: [phone]  Fax: [phone]  Pager: [phone]    Amount of time spent:  -Prep time on date of patient  encounter: 15 min  -Time spend with patient/family/caregiver: 10 min  -Additional time spent on patient care activities: 0 min  -Documentation time in medical record: 10 min  -Other time spent on date of patient encounter: 0 min  Total time: 35 min

## 2024-09-06 NOTE — LETTER
September 6, 2024     Patient: Ritesh Luther   YOB: 1980   Date of Visit: 9/6/2024       To Whom It May Concern:    It is my medical opinion that Ritesh Luther may return to full duty immediately with no restrictions.    If you have any questions or concerns, please don't hesitate to call.         Sincerely,        Ileana Fritz MD    CC: No Recipients

## 2024-10-28 DIAGNOSIS — R52 PAIN ASSOCIATED WITH WOUND: ICD-10-CM

## 2024-10-28 DIAGNOSIS — G47.00 INSOMNIA, UNSPECIFIED TYPE: ICD-10-CM

## 2024-10-28 DIAGNOSIS — T14.8XXA PAIN ASSOCIATED WITH WOUND: ICD-10-CM

## 2024-10-28 DIAGNOSIS — G89.18 POSTOPERATIVE PAIN: ICD-10-CM

## 2024-10-28 RX ORDER — TRAZODONE HYDROCHLORIDE 50 MG/1
50 TABLET ORAL NIGHTLY PRN
Qty: 30 TABLET | Refills: 5 | Status: SHIPPED | OUTPATIENT
Start: 2024-10-28 | End: 2025-10-28

## 2024-10-28 RX ORDER — IBUPROFEN 800 MG/1
TABLET ORAL
Qty: 90 TABLET | Refills: 0 | Status: SHIPPED | OUTPATIENT
Start: 2024-10-28

## 2024-11-19 ENCOUNTER — HOSPITAL ENCOUNTER (EMERGENCY)
Age: 44
Discharge: HOME | End: 2024-11-19
Attending: EMERGENCY MEDICINE
Payer: COMMERCIAL

## 2024-11-19 ENCOUNTER — APPOINTMENT (OUTPATIENT)
Dept: GENERAL RADIOLOGY | Age: 44
End: 2024-11-19
Payer: COMMERCIAL

## 2024-11-19 VITALS
TEMPERATURE: 100.1 F | BODY MASS INDEX: 24.38 KG/M2 | HEART RATE: 88 BPM | DIASTOLIC BLOOD PRESSURE: 74 MMHG | HEIGHT: 72 IN | SYSTOLIC BLOOD PRESSURE: 118 MMHG | RESPIRATION RATE: 16 BRPM | OXYGEN SATURATION: 97 % | WEIGHT: 180 LBS

## 2024-11-19 DIAGNOSIS — R19.7 NAUSEA VOMITING AND DIARRHEA: ICD-10-CM

## 2024-11-19 DIAGNOSIS — R11.2 NAUSEA VOMITING AND DIARRHEA: ICD-10-CM

## 2024-11-19 DIAGNOSIS — J18.9 PNEUMONIA OF RIGHT UPPER LOBE DUE TO INFECTIOUS ORGANISM: Primary | ICD-10-CM

## 2024-11-19 LAB
ALBUMIN SERPL-MCNC: 4 G/DL (ref 3.5–4.6)
ALP SERPL-CCNC: 112 U/L (ref 35–104)
ALT SERPL-CCNC: 14 U/L (ref 0–41)
ANION GAP SERPL CALCULATED.3IONS-SCNC: 13 MEQ/L (ref 9–15)
AST SERPL-CCNC: 18 U/L (ref 0–40)
BASOPHILS # BLD: 0 K/UL (ref 0–0.1)
BASOPHILS NFR BLD: 0.3 % (ref 0.2–1.2)
BILIRUB SERPL-MCNC: 0.7 MG/DL (ref 0.2–0.7)
BUN SERPL-MCNC: 12 MG/DL (ref 6–20)
CALCIUM SERPL-MCNC: 9.4 MG/DL (ref 8.5–9.9)
CHLORIDE SERPL-SCNC: 100 MEQ/L (ref 95–107)
CO2 SERPL-SCNC: 24 MEQ/L (ref 20–31)
CREAT SERPL-MCNC: 0.87 MG/DL (ref 0.7–1.2)
EOSINOPHIL # BLD: 0 K/UL (ref 0–0.5)
EOSINOPHIL NFR BLD: 0.3 % (ref 0.8–7)
ERYTHROCYTE [DISTWIDTH] IN BLOOD BY AUTOMATED COUNT: 13.1 % (ref 11.6–14.4)
GLOBULIN SER CALC-MCNC: 2.9 G/DL (ref 2.3–3.5)
GLUCOSE SERPL-MCNC: 110 MG/DL (ref 70–99)
HCT VFR BLD AUTO: 39.9 % (ref 42–52)
HGB BLD-MCNC: 14 G/DL (ref 13.7–17.5)
IMM GRANULOCYTES # BLD: 0 K/UL
IMM GRANULOCYTES NFR BLD: 0.3 %
LYMPHOCYTES # BLD: 0.9 K/UL (ref 1.3–3.6)
LYMPHOCYTES NFR BLD: 13 %
MCH RBC QN AUTO: 34.4 PG (ref 25.7–32.2)
MCHC RBC AUTO-ENTMCNC: 35.1 % (ref 32.3–36.5)
MCV RBC AUTO: 98 FL (ref 79–92.2)
MONOCYTES # BLD: 0.4 K/UL (ref 0.3–0.8)
MONOCYTES NFR BLD: 5.7 % (ref 5.3–12.2)
NEUTROPHILS # BLD: 5.4 K/UL (ref 1.8–5.4)
NEUTS SEG NFR BLD: 80.4 % (ref 34–67.9)
PLATELET # BLD AUTO: 122 K/UL (ref 163–337)
POTASSIUM SERPL-SCNC: 3.5 MEQ/L (ref 3.4–4.9)
PROT SERPL-MCNC: 6.9 G/DL (ref 6.3–8)
RBC # BLD AUTO: 4.07 M/UL (ref 4.63–6.08)
SARS-COV-2 RDRP RESP QL NAA+PROBE: NOT DETECTED
SODIUM SERPL-SCNC: 137 MEQ/L (ref 135–144)
STREP GRP A PCR: NEGATIVE
WBC # BLD AUTO: 6.7 K/UL (ref 4.2–9)

## 2024-11-19 PROCEDURE — 87651 STREP A DNA AMP PROBE: CPT

## 2024-11-19 PROCEDURE — 36415 COLL VENOUS BLD VENIPUNCTURE: CPT

## 2024-11-19 PROCEDURE — 85025 COMPLETE CBC W/AUTO DIFF WBC: CPT

## 2024-11-19 PROCEDURE — 96365 THER/PROPH/DIAG IV INF INIT: CPT

## 2024-11-19 PROCEDURE — 87635 SARS-COV-2 COVID-19 AMP PRB: CPT

## 2024-11-19 PROCEDURE — 2580000003 HC RX 258: Performed by: EMERGENCY MEDICINE

## 2024-11-19 PROCEDURE — 71045 X-RAY EXAM CHEST 1 VIEW: CPT

## 2024-11-19 PROCEDURE — 96375 TX/PRO/DX INJ NEW DRUG ADDON: CPT

## 2024-11-19 PROCEDURE — 6370000000 HC RX 637 (ALT 250 FOR IP): Performed by: EMERGENCY MEDICINE

## 2024-11-19 PROCEDURE — 6360000002 HC RX W HCPCS: Performed by: EMERGENCY MEDICINE

## 2024-11-19 PROCEDURE — 80053 COMPREHEN METABOLIC PANEL: CPT

## 2024-11-19 PROCEDURE — 94640 AIRWAY INHALATION TREATMENT: CPT

## 2024-11-19 PROCEDURE — 94664 DEMO&/EVAL PT USE INHALER: CPT

## 2024-11-19 PROCEDURE — 99284 EMERGENCY DEPT VISIT MOD MDM: CPT

## 2024-11-19 RX ORDER — IBUPROFEN 400 MG/1
800 TABLET, FILM COATED ORAL ONCE
Status: COMPLETED | OUTPATIENT
Start: 2024-11-19 | End: 2024-11-19

## 2024-11-19 RX ORDER — ALBUTEROL SULFATE 90 UG/1
2 INHALANT RESPIRATORY (INHALATION) ONCE
Status: COMPLETED | OUTPATIENT
Start: 2024-11-19 | End: 2024-11-19

## 2024-11-19 RX ORDER — AZITHROMYCIN 250 MG/1
TABLET, FILM COATED ORAL
Qty: 1 PACKET | Refills: 0 | Status: SHIPPED | OUTPATIENT
Start: 2024-11-19 | End: 2024-11-23

## 2024-11-19 RX ORDER — ACETAMINOPHEN 325 MG/1
650 TABLET ORAL ONCE
Status: COMPLETED | OUTPATIENT
Start: 2024-11-19 | End: 2024-11-19

## 2024-11-19 RX ORDER — ONDANSETRON 4 MG/1
4 TABLET, ORALLY DISINTEGRATING ORAL 3 TIMES DAILY PRN
Qty: 8 TABLET | Refills: 0 | Status: SHIPPED | OUTPATIENT
Start: 2024-11-19

## 2024-11-19 RX ORDER — AZITHROMYCIN 250 MG/1
500 TABLET, FILM COATED ORAL ONCE
Status: COMPLETED | OUTPATIENT
Start: 2024-11-19 | End: 2024-11-19

## 2024-11-19 RX ORDER — 0.9 % SODIUM CHLORIDE 0.9 %
1000 INTRAVENOUS SOLUTION INTRAVENOUS ONCE
Status: COMPLETED | OUTPATIENT
Start: 2024-11-19 | End: 2024-11-19

## 2024-11-19 RX ORDER — ONDANSETRON 2 MG/ML
4 INJECTION INTRAMUSCULAR; INTRAVENOUS ONCE
Status: COMPLETED | OUTPATIENT
Start: 2024-11-19 | End: 2024-11-19

## 2024-11-19 RX ADMIN — SODIUM CHLORIDE 1000 ML: 9 INJECTION, SOLUTION INTRAVENOUS at 18:54

## 2024-11-19 RX ADMIN — AZITHROMYCIN DIHYDRATE 500 MG: 250 TABLET, FILM COATED ORAL at 19:18

## 2024-11-19 RX ADMIN — ACETAMINOPHEN 650 MG: 325 TABLET ORAL at 18:54

## 2024-11-19 RX ADMIN — ONDANSETRON 4 MG: 2 INJECTION, SOLUTION INTRAMUSCULAR; INTRAVENOUS at 18:54

## 2024-11-19 RX ADMIN — IBUPROFEN 800 MG: 400 TABLET, FILM COATED ORAL at 19:59

## 2024-11-19 RX ADMIN — ALBUTEROL SULFATE 2 PUFF: 108 AEROSOL, METERED RESPIRATORY (INHALATION) at 19:27

## 2024-11-19 RX ADMIN — CEFTRIAXONE 1000 MG: 1 INJECTION, POWDER, FOR SOLUTION INTRAMUSCULAR; INTRAVENOUS at 19:19

## 2024-11-19 ASSESSMENT — PAIN - FUNCTIONAL ASSESSMENT
PAIN_FUNCTIONAL_ASSESSMENT: 0-10
PAIN_FUNCTIONAL_ASSESSMENT: PREVENTS OR INTERFERES SOME ACTIVE ACTIVITIES AND ADLS
PAIN_FUNCTIONAL_ASSESSMENT: 0-10

## 2024-11-19 ASSESSMENT — PAIN DESCRIPTION - LOCATION: LOCATION: GENERALIZED

## 2024-11-19 ASSESSMENT — PAIN SCALES - GENERAL
PAINLEVEL_OUTOF10: 4
PAINLEVEL_OUTOF10: 7
PAINLEVEL_OUTOF10: 8

## 2024-11-19 ASSESSMENT — ENCOUNTER SYMPTOMS
COLOR CHANGE: 0
EYE REDNESS: 0
DIARRHEA: 1
SHORTNESS OF BREATH: 0
EYE DISCHARGE: 0
ABDOMINAL PAIN: 0
SINUS PAIN: 0
COUGH: 1
VOMITING: 1
SORE THROAT: 1
NAUSEA: 1

## 2024-11-19 ASSESSMENT — LIFESTYLE VARIABLES
HOW OFTEN DO YOU HAVE A DRINK CONTAINING ALCOHOL: NEVER
HOW MANY STANDARD DRINKS CONTAINING ALCOHOL DO YOU HAVE ON A TYPICAL DAY: PATIENT DOES NOT DRINK

## 2024-11-19 ASSESSMENT — PAIN DESCRIPTION - PAIN TYPE: TYPE: ACUTE PAIN

## 2024-11-19 ASSESSMENT — PAIN DESCRIPTION - DESCRIPTORS: DESCRIPTORS: ACHING

## 2024-11-19 ASSESSMENT — PAIN DESCRIPTION - FREQUENCY: FREQUENCY: CONTINUOUS

## 2024-11-19 NOTE — ED PROVIDER NOTES
[facility] ED  EMERGENCY DEPARTMENT ENCOUNTER      Pt Name: Jeremy Bustamante  MRN: [id number]  Birthdate 1980  Date of evaluation: 11/19/2024  Provider: Liliana Garber DO  7:15 PM    CHIEF COMPLAINT       Chief Complaint   Patient presents with    Fever    Vomiting     Chief complaint: Cough cold congestion fever vomiting  History of chief complaint: This 44-year-old gentleman presents the emergency department complaining of getting sick 4 days ago patient states started up with increased nasal congestion sore throat repetitive cough nausea vomiting diarrhea and generalized bodyaches.  Patient states he has not been keeping anything down if he tries to eat or drink comes right back up.  Patient states he has been running intermittent fever no dysuria hematuria frequency or urgency.  Patient states cough productive of scant sputum.  No head or neck pain.  Patient states chest is sore from coughing no shortness of breath no abdominal pain.  No leg pain or swelling no recent travel history no history of DVT or PE.  Patient does report a history of pneumonia earlier this year for which he did have hospitalization patient states daughter is here sick with similar symptom    Nursing Notes were reviewed.    REVIEW OF SYSTEMS       Review of Systems   Constitutional:  Positive for chills, fatigue and fever.   HENT:  Positive for congestion and sore throat. Negative for ear pain and sinus pain.    Eyes:  Negative for discharge and redness.   Respiratory:  Positive for cough. Negative for shortness of breath.    Cardiovascular:  Negative for chest pain, palpitations and leg swelling.   Gastrointestinal:  Positive for diarrhea, nausea and vomiting. Negative for abdominal pain.   Genitourinary:  Negative for dysuria, flank pain and hematuria.   Musculoskeletal:  Positive for myalgias. Negative for neck pain.   Skin:  Negative for color change and rash.   Neurological:  Negative for weakness, numbness and  (4/4/2024)    [facility]    Hunger Vital Sign     Worried About Running Out of Food in the Last Year: Never true     Ran Out of Food in the Last Year: Never true   Transportation Needs: No Transportation Needs (3/2/2024)    [facility]    PRAPARE - Transportation     Lack of Transportation (Medical): No     Lack of Transportation (Non-Medical): No   Housing Stability: Low Risk  (3/2/2024)    [facility]    Housing Stability Vital Sign     Unable to Pay for Housing in the Last Year: No     Number of Places Lived in the Last Year: 1     Unstable Housing in the Last Year: No       PHYSICAL EXAM       ED Triage Vitals [11/19/24 1827]   BP Systolic BP Percentile Diastolic BP Percentile Temp Temp Source Pulse Respirations SpO2   138/83 -- -- (!) 100.7 °F (38.2 °C) Oral 89 17 96 %      Height Weight - Scale         1.829 m (6') 81.6 kg (180 lb)             Physical Exam  General appearance: Patient is awake alert interactive appropriate nontoxic in no acute distress  Head is atraumatic normocephalic  Eyes pupils are equal and reactive sclera white conjunctive are pink  Oral pharyngeal cavity is pink with good moisture, mild diffuse posterior erythema, no exudates or ulcerations no asymmetry, the airway is widely patent  Neck: Supple no meningeal signs no adenopathy no JVD  Heart: Regular rate and rhythm no gross murmurs rubs or clicks  Lungs: Breath sounds are coarse with recurrent rhonchorous cough during examination no gross wheezes rales or rhonchi no respiratory distress no use of accessory muscles or conversational dyspnea.  Pulse ox 96% on room air   abdomen: Soft nontender with good bowel sounds no rebound rigidity or guarding no firm or pulsatile masses, no gross distention, femoral pulses full and

## 2024-11-20 NOTE — ED TRIAGE NOTES
Pt a/o x 3 skin pink w/d resp non labored. Pt reports fever last 2-3 days and diarrhea yesterday and vomiting today. Pt in nad

## 2024-12-18 ENCOUNTER — TELEPHONE (OUTPATIENT)
Dept: PRIMARY CARE | Facility: CLINIC | Age: 44
End: 2024-12-18
Payer: COMMERCIAL

## 2024-12-18 NOTE — TELEPHONE ENCOUNTER
Wife lm   Requesting an updated letter stating patient is unable to work at this time d/t recurrent pneumonia.   [id number]

## 2025-01-07 ENCOUNTER — TELEPHONE (OUTPATIENT)
Dept: PRIMARY CARE | Facility: CLINIC | Age: 45
End: 2025-01-07
Payer: COMMERCIAL

## 2025-01-07 NOTE — TELEPHONE ENCOUNTER
"Yen left  regarding letter previously written for \"illness on his side.\"  Call Yen back [phone] or pt back.  "

## 2025-01-15 ENCOUNTER — APPOINTMENT (OUTPATIENT)
Dept: PRIMARY CARE | Facility: CLINIC | Age: 45
End: 2025-01-15
Payer: COMMERCIAL

## 2025-01-15 VITALS
HEART RATE: 62 BPM | BODY MASS INDEX: 21.94 KG/M2 | SYSTOLIC BLOOD PRESSURE: 142 MMHG | DIASTOLIC BLOOD PRESSURE: 80 MMHG | RESPIRATION RATE: 14 BRPM | WEIGHT: 162 LBS | HEIGHT: 72 IN | TEMPERATURE: 98.2 F | OXYGEN SATURATION: 98 %

## 2025-01-15 DIAGNOSIS — J18.9 RECURRENT PNEUMONIA: Primary | ICD-10-CM

## 2025-01-15 DIAGNOSIS — J47.9 BRONCHIECTASIS, UNCOMPLICATED (MULTI): ICD-10-CM

## 2025-01-15 DIAGNOSIS — F41.9 ANXIETY: ICD-10-CM

## 2025-01-15 DIAGNOSIS — F17.210 CIGARETTE NICOTINE DEPENDENCE WITHOUT COMPLICATION: ICD-10-CM

## 2025-01-15 PROCEDURE — 99214 OFFICE O/P EST MOD 30 MIN: CPT | Performed by: INTERNAL MEDICINE

## 2025-01-15 PROCEDURE — 3008F BODY MASS INDEX DOCD: CPT | Performed by: INTERNAL MEDICINE

## 2025-01-15 RX ORDER — SERTRALINE HYDROCHLORIDE 50 MG/1
50 TABLET, FILM COATED ORAL DAILY
Qty: 30 TABLET | Refills: 5 | Status: SHIPPED | OUTPATIENT
Start: 2025-01-15 | End: 2025-01-15

## 2025-01-15 RX ORDER — IBUPROFEN 200 MG
1 TABLET ORAL EVERY 24 HOURS
Qty: 30 PATCH | Refills: 0 | Status: SHIPPED | OUTPATIENT
Start: 2025-01-15 | End: 2025-02-14

## 2025-01-15 RX ORDER — IBUPROFEN 200 MG
1 TABLET ORAL EVERY 24 HOURS
Qty: 30 PATCH | Refills: 0 | Status: SHIPPED | OUTPATIENT
Start: 2025-01-15 | End: 2025-01-15

## 2025-01-15 RX ORDER — SERTRALINE HYDROCHLORIDE 50 MG/1
50 TABLET, FILM COATED ORAL DAILY
Qty: 30 TABLET | Refills: 5 | Status: SHIPPED | OUTPATIENT
Start: 2025-01-15 | End: 2025-07-14

## 2025-01-15 NOTE — PROGRESS NOTES
"Subjective    Ritesh Luther is a 44 y.o. male who presents for Follow-up.  HPI    Needs documentation for court.   Needs updated letter stating he is unable to work d/t medical reasons.   Decreased immune system. Recurrent pneumonia.   He smokes. Cannot take varencycline makes him \"angry\"  No recurrent sinus infections.    Increase in anxiety. Short fused, irritable  He is not interested in things he used to be  Never was treated. Took xanax when he was younger      Review of Systems   All other systems reviewed and are negative.        Objective     /80 (BP Location: Left arm, Patient Position: Sitting, BP Cuff Size: Adult)   Pulse 62   Temp 36.8 °C (98.2 °F) (Skin)   Resp 14   Ht 1.829 m (6')   Wt 73.5 kg (162 lb)   SpO2 98%   BMI 21.97 kg/m²    Physical Exam  Vitals reviewed.   Constitutional:       General: He is not in acute distress.     Appearance: Normal appearance.   Cardiovascular:      Rate and Rhythm: Normal rate and regular rhythm.      Pulses: Normal pulses.      Heart sounds: Normal heart sounds.   Pulmonary:      Effort: Pulmonary effort is normal.      Breath sounds: Normal breath sounds.   Abdominal:      General: Abdomen is flat.      Palpations: Abdomen is soft.      Tenderness: There is no abdominal tenderness.   Musculoskeletal:         General: No swelling.   Skin:     General: Skin is warm and dry.   Neurological:      Mental Status: He is alert.       Health Maintenance Due   Topic Date Due    Yearly Adult Physical  Never done    Lipid Panel  Never done    Thyroglobulin Test  Never done    Colorectal Cancer Screening  Never done    MMR Vaccines (1 of 1 - Standard series) Never done    Hepatitis C Screening  Never done    Hepatitis B Vaccines (1 of 3 - 19+ 3-dose series) Never done    Hepatitis A Vaccines (1 of 2 - Risk 2-dose series) Never done    Pneumococcal Vaccine: Pediatrics and At-Risk Adult Patients (1 of 2 - PCV) Never done    Influenza Vaccine (1) Never done    " COVID-19 Vaccine (1 - 2024-25 season) Never done          Assessment/Plan   Problem List Items Addressed This Visit       Bronchiectasis, uncomplicated (Multi)     Other Visit Diagnoses       Recurrent pneumonia    -  Primary    Relevant Orders    Referral to Immunology    Anxiety        Relevant Medications    sertraline (Zoloft) 50 mg tablet    Cigarette nicotine dependence without complication        Relevant Medications    nicotine (Nicoderm CQ) 21 mg/24 hr patch        He has recurrent pneumonia. He is not disabled. He may have some immunocompromise hiv is neg.  The first thing I would recommend is that he stop smoking. Smokes a little more than a half pack a day  Renew nicotine patch. Discussed side effects.   Will refer to immunology for further testing.  He has increased anxiety. Will start  sertraline. Side effects discussed.  Call  with any problems or questions.   Follow up in one month will need physical.

## 2025-01-22 ENCOUNTER — TELEPHONE (OUTPATIENT)
Dept: PRIMARY CARE | Facility: CLINIC | Age: 45
End: 2025-01-22
Payer: COMMERCIAL

## 2025-02-18 ENCOUNTER — APPOINTMENT (OUTPATIENT)
Dept: PRIMARY CARE | Facility: CLINIC | Age: 45
End: 2025-02-18
Payer: COMMERCIAL

## 2025-04-13 DIAGNOSIS — G89.18 POSTOPERATIVE PAIN: ICD-10-CM

## 2025-04-13 DIAGNOSIS — R52 PAIN ASSOCIATED WITH WOUND: ICD-10-CM

## 2025-04-13 DIAGNOSIS — T14.8XXA PAIN ASSOCIATED WITH WOUND: ICD-10-CM

## 2025-04-14 RX ORDER — GABAPENTIN 300 MG/1
600 CAPSULE ORAL 3 TIMES DAILY
Qty: 180 CAPSULE | Refills: 2 | Status: SHIPPED | OUTPATIENT
Start: 2025-04-14 | End: 2026-04-14

## 2025-06-04 DIAGNOSIS — G47.00 INSOMNIA, UNSPECIFIED TYPE: ICD-10-CM

## 2025-06-04 RX ORDER — TRAZODONE HYDROCHLORIDE 50 MG/1
50 TABLET ORAL NIGHTLY PRN
Qty: 30 TABLET | Refills: 5 | Status: SHIPPED | OUTPATIENT
Start: 2025-06-04 | End: 2026-06-04

## 2025-08-15 DIAGNOSIS — R91.8 LUNG NODULES: ICD-10-CM

## 2025-09-05 ENCOUNTER — APPOINTMENT (OUTPATIENT)
Dept: SURGERY | Facility: CLINIC | Age: 45
End: 2025-09-05
Payer: COMMERCIAL

## 2025-09-05 ENCOUNTER — APPOINTMENT (OUTPATIENT)
Dept: RADIOLOGY | Facility: HOSPITAL | Age: 45
End: 2025-09-05
Payer: COMMERCIAL

## (undated) DEVICE — BIT DRL L56/39MM DIA1.1MM MINI QUIK CPL FOR 1.5MM SCR PILOT

## (undated) DEVICE — PAD,ABDOMINAL,8"X10",ST,LF: Brand: MEDLINE

## (undated) DEVICE — GLOVE ORANGE PI 8   MSG9080

## (undated) DEVICE — BIT DRL L62/31MM DIA1.5MM FOR JCBS CHK GLIDING H VAR ANG

## (undated) DEVICE — TUBING, IRRIGATION, HIGH FLOW, HAND PIECE SET

## (undated) DEVICE — GOWN, SURGICAL, ROYAL SILK, LG, STERILE

## (undated) DEVICE — DRAPE, SHEET, XL

## (undated) DEVICE — STRIP, SKIN CLOSURE, COMPOUND BENZION TINCTURE 0.6ML

## (undated) DEVICE — GLOVE ORANGE PI 7   MSG9070

## (undated) DEVICE — BANDAGE COMPR W3INXL5YD BGE POLY COT E RECOVERABLE BRTH W/

## (undated) DEVICE — UNDERCAST PADDING: Brand: DEROYAL

## (undated) DEVICE — TRAY PREP DRY W/ PREM GLV 2 APPL 6 SPNG 2 UNDPD 1 OVERWRAP

## (undated) DEVICE — BOWL, BASIN, 32 OZ, STERILE

## (undated) DEVICE — PENCIL ES L3M BTTN SWCH HOLSTER W/ BLDE ELECTRD EDGE

## (undated) DEVICE — STERILE COMFO-TEX LATEX FREE ELASTIC BANDAGE, 2INX5YD: Brand: COMFO-TEX™

## (undated) DEVICE — GLOVE, SURGICAL, PROTEXIS PI BLUE W/NEUTHERA, 7.0, PF, LF

## (undated) DEVICE — GLOVE, SURGICAL, PROTEXIS PI , 7.0, PF, LF

## (undated) DEVICE — ZIMMER® STERILE DISPOSABLE TOURNIQUET CUFF, DUAL PORT, SINGLE BLADDER, 18 IN. (46 CM)

## (undated) DEVICE — STRAP, ARM BOARD, 32 X 1.5

## (undated) DEVICE — MICRODISSECTION NEEDLE STRAIGHT SLEEVE: Brand: COLORADO

## (undated) DEVICE — Device

## (undated) DEVICE — GAUZE SPONGES,12 PLY: Brand: CURITY

## (undated) DEVICE — APPLICATOR, CHLORAPREP, W/ORANGE TINT, 26ML

## (undated) DEVICE — GOWN,AURORA,NONREINFORCED,LARGE: Brand: MEDLINE

## (undated) DEVICE — DISCONTINUED USE 215159 SLING ORTHO MED COTTON ARM ENVELOPE

## (undated) DEVICE — SYRINGE, 10 CC, LUER LOCK

## (undated) DEVICE — 2000CC GUARDIAN II: Brand: GUARDIAN

## (undated) DEVICE — SPONGE GZ W4XL4IN RAYON POLY FILL CVR W/ NONWOVEN FAB

## (undated) DEVICE — MEDI-VAC NON-CONDUCTIVE SUCTION TUBING: Brand: CARDINAL HEALTH

## (undated) DEVICE — GLOVE SURG SZ 65 THK91MIL LTX FREE SYN POLYISOPRENE

## (undated) DEVICE — SUTURE, VICRYL, 3-0, 27 IN, SH

## (undated) DEVICE — BANDAGE, GAUZE, CONFORMING, KERLIX, 6 PLY, 4.5 IN X 4.1 YD

## (undated) DEVICE — NEEDLE, SAFETY, 25 GA X 1.5 IN

## (undated) DEVICE — STRAP, VELCRO, BODY, 4 X 60IN, NS

## (undated) DEVICE — DRAPE CARM MINI FOR IMAG SYS INSIGHT FLROSCN

## (undated) DEVICE — GLOVE SURG SZ 7 L12IN FNGR THK94MIL TRNSLUC YEL LTX HYDRGEL

## (undated) DEVICE — COLLECTION/DELIVERY SYSTEM, COPAN ESWAB, REG SIZE SWAB

## (undated) DEVICE — HAND II: Brand: MEDLINE INDUSTRIES, INC.

## (undated) DEVICE — 1010 S-DRAPE TOWEL DRAPE 10/BX: Brand: STERI-DRAPE™

## (undated) DEVICE — DRESSING PETRO W3XL8IN N ADH KNIT CELOS ACETT ADPTC

## (undated) DEVICE — ELECTRODE PT RET AD L9FT HI MOIST COND ADH HYDRGEL CORDED

## (undated) DEVICE — DRESSING, NON-ADHERENT, TELFA, OUCHLESS, 3 X 8 IN, STERILE

## (undated) DEVICE — TOWEL PACK 10-PK

## (undated) DEVICE — SCREW BNE L13MM DIA1.5MM CORT S STL ST FULL THRD T4 STARDRV
Type: IMPLANTABLE DEVICE | Site: HAND | Status: NON-FUNCTIONAL
Removed: 2017-07-17

## (undated) DEVICE — GLOVE ORANGE PI 7 1/2   MSG9075

## (undated) DEVICE — SYRINGE BLB 50CC IRRIG PLIABLE FNGR FLNG GRAD FLSK DISP

## (undated) DEVICE — ELECTRODE, ELECTROSURGICAL, BLADE, INSULATED, ENT/IMA, STERILE

## (undated) DEVICE — STRIP, SKIN CLOSURE, STERI STRIP, REINFORCED, 0.5 X 4 IN

## (undated) DEVICE — SUTURE, MONOCRYL, 4-0, 27 IN, PS-2, UNDYED

## (undated) DEVICE — GOWN, SURGICAL, ROYAL SILK, XL, STERILE

## (undated) DEVICE — BIT DRL L70/39MM DIA1.1MM FOR PILOT H JCBS CHK 1.5MM SCR